# Patient Record
Sex: MALE | Race: BLACK OR AFRICAN AMERICAN | NOT HISPANIC OR LATINO | URBAN - METROPOLITAN AREA
[De-identification: names, ages, dates, MRNs, and addresses within clinical notes are randomized per-mention and may not be internally consistent; named-entity substitution may affect disease eponyms.]

---

## 2017-05-26 ENCOUNTER — EMERGENCY (EMERGENCY)
Facility: HOSPITAL | Age: 57
LOS: 1 days | Discharge: HOME | End: 2017-05-26
Attending: EMERGENCY MEDICINE | Admitting: EMERGENCY MEDICINE
Payer: SELF-PAY

## 2017-05-26 VITALS
HEART RATE: 55 BPM | HEIGHT: 71 IN | WEIGHT: 175.05 LBS | RESPIRATION RATE: 17 BRPM | TEMPERATURE: 98 F | DIASTOLIC BLOOD PRESSURE: 74 MMHG | OXYGEN SATURATION: 98 % | SYSTOLIC BLOOD PRESSURE: 110 MMHG

## 2017-05-26 DIAGNOSIS — M25.561 PAIN IN RIGHT KNEE: ICD-10-CM

## 2017-05-26 DIAGNOSIS — F14.10 COCAINE ABUSE, UNCOMPLICATED: ICD-10-CM

## 2017-05-26 PROCEDURE — 99283 EMERGENCY DEPT VISIT LOW MDM: CPT | Mod: 25

## 2017-05-26 PROCEDURE — 99053 MED SERV 10PM-8AM 24 HR FAC: CPT

## 2017-05-26 RX ORDER — ACETAMINOPHEN 500 MG
650 TABLET ORAL ONCE
Qty: 0 | Refills: 0 | Status: COMPLETED | OUTPATIENT
Start: 2017-05-26 | End: 2017-05-26

## 2017-05-26 RX ADMIN — Medication 650 MILLIGRAM(S): at 02:20

## 2017-05-26 NOTE — ED PROVIDER NOTE - MEDICAL DECISION MAKING DETAILS
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 will d/c. ambulatory with cane at baseline.

## 2017-05-26 NOTE — ED PROVIDER NOTE - OBJECTIVE STATEMENT
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 to [facility] and marijuana, patient uncooperative with history or physical, refusing to change into gown or to cooperate with xrays. Ambulates with cane at baseline.

## 2017-05-26 NOTE — ED ADULT TRIAGE NOTE - CHIEF COMPLAINT QUOTE
bibems c/o right knee pain x 1 day with swelling, picked up by ems from front of precinct. admits to smoking crack cocaine and marijuana, falling asleep when spoken to.

## 2019-12-21 ENCOUNTER — EMERGENCY (EMERGENCY)
Facility: HOSPITAL | Age: 59
LOS: 1 days | Discharge: HOME | End: 2019-12-21
Attending: EMERGENCY MEDICINE | Admitting: EMERGENCY MEDICINE
Payer: MEDICAID

## 2019-12-21 ENCOUNTER — EMERGENCY (EMERGENCY)
Facility: HOSPITAL | Age: 59
LOS: 1 days | Discharge: HOME | End: 2019-12-21
Admitting: EMERGENCY MEDICINE
Payer: MEDICAID

## 2019-12-21 VITALS
RESPIRATION RATE: 16 BRPM | OXYGEN SATURATION: 96 % | SYSTOLIC BLOOD PRESSURE: 144 MMHG | DIASTOLIC BLOOD PRESSURE: 94 MMHG | HEIGHT: 71 IN | WEIGHT: 160.06 LBS | HEART RATE: 65 BPM | TEMPERATURE: 97 F

## 2019-12-21 VITALS
WEIGHT: 179.9 LBS | RESPIRATION RATE: 16 BRPM | HEART RATE: 58 BPM | HEIGHT: 70 IN | OXYGEN SATURATION: 98 % | DIASTOLIC BLOOD PRESSURE: 77 MMHG | SYSTOLIC BLOOD PRESSURE: 115 MMHG | TEMPERATURE: 98 F

## 2019-12-21 VITALS
RESPIRATION RATE: 16 BRPM | OXYGEN SATURATION: 99 % | TEMPERATURE: 98 F | SYSTOLIC BLOOD PRESSURE: 104 MMHG | DIASTOLIC BLOOD PRESSURE: 71 MMHG | HEART RATE: 69 BPM

## 2019-12-21 PROCEDURE — 99283 EMERGENCY DEPT VISIT LOW MDM: CPT

## 2019-12-21 RX ORDER — IBUPROFEN 200 MG
600 TABLET ORAL ONCE
Refills: 0 | Status: COMPLETED | OUTPATIENT
Start: 2019-12-21 | End: 2019-12-21

## 2019-12-21 RX ORDER — LIDOCAINE 4 G/100G
1 CREAM TOPICAL ONCE
Refills: 0 | Status: COMPLETED | OUTPATIENT
Start: 2019-12-21 | End: 2019-12-21

## 2019-12-21 RX ORDER — METHOCARBAMOL 500 MG/1
1000 TABLET, FILM COATED ORAL ONCE
Refills: 0 | Status: COMPLETED | OUTPATIENT
Start: 2019-12-21 | End: 2019-12-21

## 2019-12-21 RX ORDER — OXYCODONE AND ACETAMINOPHEN 5; 325 MG/1; MG/1
2 TABLET ORAL ONCE
Refills: 0 | Status: DISCONTINUED | OUTPATIENT
Start: 2019-12-21 | End: 2019-12-21

## 2019-12-21 RX ADMIN — METHOCARBAMOL 1000 MILLIGRAM(S): 500 TABLET, FILM COATED ORAL at 11:02

## 2019-12-21 RX ADMIN — Medication 600 MILLIGRAM(S): at 11:02

## 2019-12-21 RX ADMIN — LIDOCAINE 1 PATCH: 4 CREAM TOPICAL at 02:20

## 2019-12-21 RX ADMIN — Medication 500 MILLIGRAM(S): at 02:20

## 2019-12-21 RX ADMIN — Medication 600 MILLIGRAM(S): at 16:11

## 2019-12-21 RX ADMIN — OXYCODONE AND ACETAMINOPHEN 2 TABLET(S): 5; 325 TABLET ORAL at 16:13

## 2019-12-21 NOTE — ED ADULT TRIAGE NOTE - CHIEF COMPLAINT QUOTE
BIBA after [place] 3 steps [place] train station. was seen at [facility] earlier today with same complaint of left knee pain radiating to lower back. pt admits to ETOH prior to first ED visit.

## 2019-12-21 NOTE — ED PROVIDER NOTE - NSFOLLOWUPINSTRUCTIONS_ED_ALL_ED_FT
1)  PLEASE FOLLOW-UP WITH YOUR PRIMARY CARE DOCTOR OR REFERRED SPECIALIST IN 1-2 DAYS.     2)  BRING ALL PAPERWORK FROM TODAY'S EMERGENCY ROOM  VISIT TO YOUR FOLLOW-UP VISIT.  IF YOU DO NOT HAVE A PRIMARY CARE DOCTOR PLEASE REFER TO THE OFFICE/CLINIC INFORMATION GIVEN ABOVE.  YOU MAY ALSO CALL [phone] AND ASK FOR MS. MAGALY BRANDT.  SHE CAN HELP YOU MAKE A FOLLOW-UP APPOINTMENT.  HER HOURS ARE 11AM-7PM MONDAY - FRIDAY.    3) PLEASE RETURN TO THE ER IMMEDIATELY OR CALL 911 FOR ANY HIGH FEVER, TROUBLE BREATHING, CHEST PAIN, WEAKNESS, VOMITING, SEVERE PAIN, OR ANY OTHER CONCERNS.

## 2019-12-21 NOTE — ED PROVIDER NOTE - CLINICAL SUMMARY MEDICAL DECISION MAKING FREE TEXT BOX
Pt presents to ED with c/o back pain and sustained head injury. States he fell down some steps 2 days ago. Pt requesting pain medication. Give Motrin and Robaxin. No imaging at this time as there is no acute trauma. No neuro/focal deficits. Will have pt consult with SBIRT given frequent alcohol use.

## 2019-12-21 NOTE — ED PROVIDER NOTE - PROGRESS NOTE DETAILS
Patient resting comfortably, awoken to voice, awake, alert, oriented x 3, has eaten and states he feels better after the medication.  Plan to discharge patient with resources.

## 2019-12-21 NOTE — ED PROVIDER NOTE - OBJECTIVE STATEMENT
59 y o male with PMHx of arthritis, homeless, was BIBA with c/o back pain and head injury. Pt was seen at [facility] several hours ago with c/o left knee pain. States that he fell down 3 steps 2 days ago and hit his head. Pt complaining of lower back pain at this time. States that he came from [employer] and that when he stood up from sitting, he developed back spasms. No numbness, tingling, weakness, or other sx. No LOC. 59 y o male with PMHx of arthritis, homeless, was BIBA with c/o back pain and head injury. Pt was seen at [facility] several hours ago with c/o left knee pain. States that he fell down 3 steps x2 days ago and hit his head. Pt complaining of lower back pain at this time. States that he came from [employer] and that when he stood up from sitting, he developed back spasms. No numbness, tingling, weakness, or other sx. No LOC. Pt admits to drinking regularly 59 y o male with PMHx of arthritis, homeless, was BIBA with c/o back pain and head injury. Pt was seen at [facility] several hours ago with c/o left knee pain. States that he fell down 3 steps x2 days ago and hit his head. Pt complaining of lower back pain at this time. States that he came from [employer] and that when he stood up from sitting, he developed back spasms. No numbness, tingling, weakness, or other sx. No LOC. Pt admits to drinking regularly.  No numbness, tingling, or loss of function.  Able to walk with cane.

## 2019-12-21 NOTE — ED PROVIDER NOTE - CLINICAL SUMMARY MEDICAL DECISION MAKING FREE TEXT BOX
left knee pain from arthritis. no new injury or trauma. no indication for xray. able to ambulate with cane.

## 2019-12-21 NOTE — ED ADULT NURSE NOTE - CHIEF COMPLAINT QUOTE
BIBA after [place] 3 [place] train station. was seen at [facility] earlier today with same complaint of left knee pain radiating to lower back. pt admits to ETOH prior to first ED visit.

## 2019-12-21 NOTE — ED PROVIDER NOTE - PATIENT PORTAL LINK FT
You can access the FollowMyHealth Patient Portal offered by [facility] by registering at the following website: [email or web address]. By joining [employer]’s FollowMyHealth portal, you will also be able to view your health information using other applications (apps) compatible with our system.

## 2019-12-21 NOTE — ED PROVIDER NOTE - OBJECTIVE STATEMENT
59 year old male with h/o arthritis, homeless presents with left knee pain x months. reports ongoing pain, some days better an dother days worse.   Denies head trauma, LOC, break in the skin, paresthesia, numbness, tingling, redness, bleeding, d/c, HA, dizziness, SOB, CP, palpitations, N/V, focal weakness, and malaise.

## 2019-12-21 NOTE — ED PROVIDER NOTE - NSFOLLOWUPINSTRUCTIONS_ED_ALL_ED_FT
Please see your primary care doctor in the next 2-3 days for a repeat evaluation.  Please take all of today's paperwork with you.  If you don't have a doctor or would like help finding a new one, please contact our call center at [phone].    Please return immediately to the Emergency Department if you have pain, fever, trouble breathing, a rash, or if you have any other problems or concerns at all.   You can reach us at [phone], 24 hours a day, 7 days a week.

## 2019-12-21 NOTE — ED PROVIDER NOTE - PATIENT PORTAL LINK FT
You can access the FollowMyHealth Patient Portal offered by [facility] by registering at the following website: [email or web address]. By joining [employer]’s FollowMyHealth portal, you will also be able to view your health information using other applications (apps) compatible with our system.

## 2019-12-21 NOTE — ED PROVIDER NOTE - NS_EDPROVIDERDISPOUSERTYPE_ED_A_ED
[address] [facility]   Patient Discharge Instructions    Bill Borrero / [id number] : 1947    Admitted 2018 Discharged: 2018     IF YOU HAVE ANY PROBLEMS ONCE YOU ARE AT HOME CALL THE FOLLOWING NUMBERS:   Main office number: [phone]    Take Home Medications     · It is important that you take the medication exactly as they are prescribed. · Keep your medication in the bottles provided by the pharmacist and keep a list of the medication names, dosages, and times to be taken in your wallet. · Do not take other medications without consulting your doctor. What to do at [address] your prehospital diet. If you have excessive nausea or vomitting call your doctor's office     Home Physical Therapy is arranged. Use rolling walker when walking. Patients who have had a joint replacement should not drive until you are seen for your follow up appointment by Dr. Narda Menjivar. When to Call    - Call if you have a temperature greater then 101  - Unable to keep food down  - Loose control of your bladder or bowel function  - Are unable to bear any weight   - Need a pain medication refill       DISCHARGE SUMMARY from Nurse    The following personal items collected during your admission are returned to you:   Dental Appliance: Dental Appliances: None  Vision: Visual Aid: Glasses  Hearing Aid:   na  Jewelry: Jewelry: None  Clothing: Clothing: At bedside  Other Valuables: Other Valuables: Cell Phone  Valuables sent to safe: Personal Items Sent to Safe: n/a    PATIENT INSTRUCTIONS:    After general anesthesia or intravenous sedation, for 24 hours or while taking prescription Narcotics:  · Limit your activities  · Do not drive and operate hazardous machinery  · Do not make important personal or business decisions  · Do  not drink alcoholic beverages  · If you have not urinated within 8 hours after discharge, please contact your surgeon on call.     Report the following to your surgeon:  · Excessive pain, swelling, redness or odor of or around the surgical area  · Temperature over 101  · Nausea and vomiting lasting longer than 4 hours or if unable to take medications  · Any signs of decreased circulation or nerve impairment to extremity: change in color, persistent  numbness, tingling, coldness or increase pain  · Follow hip precautions @ all times! · Any questions, call office @ [phone]      Keep scheduled follow up appointment. If need to change, call office @ [phone]. *  Please give a list of your current medications to your Primary Care Provider. *  Please update this list whenever your medications are discontinued, doses are      changed, or new medications (including over-the-counter products) are added. *  Please carry medication information at all times in case of emergency situations. Hip Replacement Surgery (Posterior): What to Expect at Home  Your Recovery  Hip replacement surgery replaces the worn parts of your hip joint. When you leave the hospital, you will probably be walking with crutches or a walker. You may be able to climb a few stairs and get in and out of bed and chairs. But you will need someone to help you at home for the next few weeks or until you have more energy and can move around better. If there is no one to help you at home, you may go to a rehabilitation center or long-term care center. You will go home with a bandage and stitches or staples. You can remove the bandage when your doctor tells you to. Your doctor will remove your stitches or staples 10 days to 3 weeks after your surgery. You may still have some mild pain, and the area may be swollen for 3 to 4 months after surgery. Your doctor will give you medicine for the pain. You will continue the rehabilitation program (rehab) you started in the hospital. The better you do with your rehab exercises, the sooner you will get your strength and movement back.  Most people are able to return to work 4 weeks to 4 months after surgery. This care sheet gives you a general idea about how long it will take for you to recover. But each person recovers at a different pace. Follow the steps below to get better as quickly as possible. How can you care for yourself at home? Activity  ? · Your doctor may not want your affected leg to cross the center of your body toward the other leg. If so, your therapist may suggest these ideas:  ¨ Do not cross your legs. ¨ Be very careful as you get in or out of bed or a car, so your leg does not cross that imaginary line in the middle of your body. ? · Rest when you feel tired. You may take a nap, but do not stay in bed all day. ? · Work with your physical therapist to learn the best way to exercise. You may be able to take frequent, short walks using crutches or a walker. You will probably have to use crutches or a walker for at least 4 to 6 weeks. ? · Your doctor may advise you to stay away from activities that put stress on the joint. This includes sports such as tennis, football, and jogging. ? · Try not to sit for too long at one time. You will feel less stiff if you take a short walk about every hour. When you sit, use chairs with arms, and do not sit in low chairs. ? · Do not bend over more than 90 degrees (like the angle in a letter \"L\"). ? · Sleep on your back with your legs slightly apart or on your side with a pillow between your knees for about 6 weeks or as your doctor tells you. Do not sleep on your stomach or affected leg. ? · You may need to take sponge baths until your stitches or staples have been removed. You will probably be able to shower 24 hours after they are removed. ? · Ask your doctor when you can drive again. ? · Most people are able to return to work 4 weeks to 4 months after surgery. ? · Ask your doctor when it is okay for you to have sex. Diet  ?  · By the time you leave the hospital, you will probably be eating your normal diet. If your stomach is upset, try bland, low-fat foods like plain rice, broiled chicken, toast, and yogurt. Your doctor may recommend that you take iron and vitamin supplements. ? · Drink plenty of fluids (unless your doctor tells you not to). ? · Eat healthy foods, and watch your portion sizes. Try to stay at your ideal weight. Too much weight puts more stress on your new hip joint. ? · You may notice that your bowel movements are not regular right after your surgery. This is common. Try to avoid constipation and straining with bowel movements. You may want to take a fiber supplement every day. If you have not had a bowel movement after a couple of days, ask your doctor about taking a mild laxative. Medicines  ? · Your doctor will tell you if and when you can restart your medicines. He or she will also give you instructions about taking any new medicines. ? · If you take blood thinners, such as warfarin (Coumadin), clopidogrel (Plavix), or aspirin, be sure to talk to your doctor. He or she will tell you if and when to start taking those medicines again. Make sure that you understand exactly what your doctor wants you to do.   ? · Your doctor may give you a blood-thinning medicine to prevent blood clots. If you take a blood thinner, be sure you get instructions about how to take your medicine safely. Blood thinners can cause serious bleeding problems. This medicine could be in pill form or as a shot (injection). If a shot is necessary, your doctor will tell you how to do this. ? · Be safe with medicines. Take pain medicines exactly as directed. ¨ If the doctor gave you a prescription medicine for pain, take it as prescribed. ¨ If you are not taking a prescription pain medicine, ask your doctor if you can take an over-the-counter medicine.    ? · If you think your pain medicine is making you sick to your stomach:  ¨ Take your medicine after meals (unless your doctor has told you not to).  ¨ Ask your doctor for a different pain medicine. ? · If your doctor prescribed antibiotics, take them as directed. Do not stop taking them just because you feel better. You need to take the full course of antibiotics. Incision care  ? · You will have a bandage over the cut (incision) and staples or stitches. Follow your doctor's instructions on when to take the bandage off. Giving the incision air will help it heal.   ? · Your doctor will remove the staples or stitches 10 days to 3 weeks after the surgery and replace them with strips of tape. Leave the strips on for a week or until they fall off. Exercise  ? · Your rehab program will include a number of exercises to do. Always do them as your therapist tells you. Ice and elevation  ? · For pain, put ice or a cold pack on the area for 10 to 20 minutes at a time. Put a thin cloth between the ice and your skin. ? · Your ankle may swell for about 3 months. Prop up your ankle when you ice it or anytime you sit or lie down. Try to keep it above the level of your heart. This will help reduce swelling. Other instructions  ? Continue to wear your support stockings as your doctor says. These help to prevent blood clots. The length of time that you will have to wear them depends on your activity level and the amount of swelling you have. Most people wear these stockings for 4 to 6 weeks after surgery. ?Preventing falls is also very important. To prevent falls:  ? · Arrange furniture so that you will not trip on it. ? · Get rid of throw rugs, and move electrical cords out of the way. ? · Walk only in areas with plenty of light. ? · Put grab bars in showers and bathtubs. ? · Avoid icy or snowy sidewalks. ? · Wear shoes with sturdy, flat soles. Follow-up care is a key part of your treatment and safety. Be sure to make and go to all appointments, and call your doctor if you are having problems.  It's also a good idea to know your test results and keep a list of the medicines you take. When should you call for help? Call 911 anytime you think you may need emergency care. For example, call if:  ? · You passed out (lost consciousness). ? · You have severe trouble breathing. ? · You have sudden chest pain and shortness of breath, or you cough up blood. ?Call your doctor now or seek immediate medical care if:  ? · You have signs that your hip may be dislocated, including:  ¨ Severe pain and not being able to stand. ¨ A crooked leg that looks like your hip is out of position. ¨ Not being able to bend or straighten your leg. ? · Your leg or foot is cool or pale or changes color. ? · You cannot feel or move your leg. ? · You have signs of a blood clot, such as:  ¨ Pain in your calf, back of the knee, thigh, or groin. ¨ Redness and swelling in your leg or groin. ? · Your incision comes open and begins to bleed, or the bleeding increases. ? · You feel like your heart is racing or beating irregularly. ? · You have signs of infection, such as:  ¨ Increased pain, swelling, warmth, or redness. ¨ Red streaks leading from the incision. ¨ Pus draining from the incision. ¨ A fever. ? Watch closely for changes in your health, and be sure to contact your doctor if:  ? · You do not have a bowel movement after taking a laxative. ? · You do not get better as expected. Where can you learn more? Go to http://bryan-wilda.info/. Enter C294 in the search box to learn more about \"Hip Replacement Surgery (Posterior): What to Expect at Home. \"  Current as of: March 21, 2017  Content Version: 11.4  © 6078-0220 [employer]. Care instructions adapted under license by [employer] (which disclaims liability or warranty for this information).  If you have questions about a medical condition or this instruction, always ask your healthcare professional. [facility] [address] any warranty or liability for your use of this information. These are general instructions for a healthy lifestyle:    No smoking/ No tobacco products/ Avoid exposure to second hand smoke    Surgeon General's Warning:  Quitting smoking now greatly reduces serious risk to your health. Obesity, smoking, and sedentary lifestyle greatly increases your risk for illness    A healthy diet, regular physical exercise & weight monitoring are important for maintaining a healthy lifestyle    You may be retaining fluid if you have a history of heart failure or if you experience any of the following symptoms:  Weight gain of 3 pounds or more overnight or 5 pounds in a week, increased swelling in our hands or feet or shortness of breath while lying flat in bed. Please call your doctor as soon as you notice any of these symptoms; do not wait until your next office visit. Recognize signs and symptoms of STROKE:    F-face looks uneven    A-arms unable to move or move even    S-speech slurred or non-existent    T-time-call 911 as soon as signs and symptoms begin-DO NOT go       Back to bed or wait to see if you get better-TIME IS BRAIN. The discharge information has been reviewed with the patient. The patient verbalized understanding. Information obtained by :  I understand that if any problems occur once I am at home I am to contact my physician. I understand and acknowledge receipt of the instructions indicated above.                                                                                                                                            Physician's or R.N.'s Signature                                                                  Date/Time                                                                                                                                              Patient or Representative Signature                                                          Date/Time Scribe Attestation (For Scribes USE Only)... Attending Attestation (For Attendings USE Only).../Scribe Attestation (For [employer] USE Only)...

## 2019-12-21 NOTE — ED PROVIDER NOTE - PHYSICAL EXAMINATION
VITAL SIGNS: I have reviewed nursing notes and confirm.  CONSTITUTIONAL: Well-developed; well-nourished; in no acute distress.  SKIN: Skin is warm and dry, no acute rash.  HEAD: Normocephalic; atraumatic.  EYES: PERRL, EOM intact; conjunctiva and sclera clear.  ENT: No nasal discharge; airway clear.  NECK: Supple; non tender.  CARD: S1, S2 normal; no murmurs, gallops, or rubs. Regular rate and rhythm.  RESP: No wheezes, rales or rhonchi.  ABD: Normal bowel sounds; soft; non-distended; non-tender;  no palpable or pulsating mass; no hepatosplenomegaly.  EXT: left knee: no swelling, ROM intact, no bony tenderness active ROM normal, Anterior Drawer NEG, Lachman NEG, Valgus stress NEG, Varus stress NEG, Checo test NEG, Apley Grind test NEG  OTHER: Normal ROM. No clubbing, cyanosis or edema.  NEURO: Alert, oriented. Grossly unremarkable.  PSYCH: Cooperative, appropriate.

## 2019-12-21 NOTE — ED ADULT NURSE NOTE - NSIMPLEMENTINTERV_GEN_ALL_ED
Implemented All Universal Safety Interventions:  [place] to call system. Call bell, personal items and telephone within reach. Instruct patient to call for assistance. Room bathroom lighting operational. Non-slip footwear when patient is off stretcher. Physically safe environment: no spills, clutter or unnecessary equipment. Stretcher in lowest position, wheels locked, appropriate side rails in place.

## 2019-12-27 DIAGNOSIS — M54.9 DORSALGIA, UNSPECIFIED: ICD-10-CM

## 2019-12-27 DIAGNOSIS — M46.96 UNSPECIFIED INFLAMMATORY SPONDYLOPATHY, LUMBAR REGION: ICD-10-CM

## 2019-12-28 DIAGNOSIS — M25.562 PAIN IN LEFT KNEE: ICD-10-CM

## 2020-06-09 PROBLEM — M19.90 UNSPECIFIED OSTEOARTHRITIS, UNSPECIFIED SITE: Chronic | Status: ACTIVE | Noted: 2019-12-21

## 2020-06-09 PROBLEM — E07.9 DISORDER OF THYROID, UNSPECIFIED: Chronic | Status: ACTIVE | Noted: 2019-12-21

## 2020-06-10 ENCOUNTER — APPOINTMENT (OUTPATIENT)
Dept: OTOLARYNGOLOGY | Facility: CLINIC | Age: 60
End: 2020-06-10

## 2020-06-10 PROBLEM — Z00.00 ENCOUNTER FOR PREVENTIVE HEALTH EXAMINATION: Status: ACTIVE | Noted: 2020-06-10

## 2021-01-22 NOTE — ED ADULT TRIAGE NOTE - CADM TRG TX PRIOR TO ARRIVAL
----- Message from Don Blackmon MD sent at 1/22/2021  2:29 PM EST -----  Please call the patient about their results with the following discussion points:    The good news is that your thyroid panel, liver, kidneys, and electrolytes look normal    Unfortunately your cholesterol is moderately elevated - LDL is 122. This can be associated with a long-term risk of heart disease. I do not recommend a medication at this time but I do recommend regular exercise and reducing red meat and fatty food consumption.  We will recheck this annually.     none

## 2021-06-02 NOTE — ED ADULT TRIAGE NOTE - HEIGHT IN INCHES
10

## 2021-12-06 ENCOUNTER — EMERGENCY (EMERGENCY)
Facility: HOSPITAL | Age: 61
LOS: 1 days | Discharge: HOME | End: 2021-12-06
Attending: EMERGENCY MEDICINE
Payer: MEDICARE

## 2021-12-06 VITALS
HEART RATE: 98 BPM | HEIGHT: 71 IN | DIASTOLIC BLOOD PRESSURE: 82 MMHG | OXYGEN SATURATION: 96 % | SYSTOLIC BLOOD PRESSURE: 131 MMHG | WEIGHT: 160.06 LBS | RESPIRATION RATE: 17 BRPM | TEMPERATURE: 98 F

## 2021-12-06 DIAGNOSIS — Z78.9 OTHER SPECIFIED HEALTH STATUS: Chronic | ICD-10-CM

## 2021-12-06 PROCEDURE — 72125 CT NECK SPINE W/O DYE: CPT | Mod: MA

## 2021-12-06 PROCEDURE — 99284 EMERGENCY DEPT VISIT MOD MDM: CPT | Mod: 25

## 2021-12-06 PROCEDURE — 99284 EMERGENCY DEPT VISIT MOD MDM: CPT

## 2021-12-06 PROCEDURE — 70450 CT HEAD/BRAIN W/O DYE: CPT | Mod: MA

## 2021-12-06 PROCEDURE — 72125 CT NECK SPINE W/O DYE: CPT | Mod: 26,MA

## 2021-12-06 PROCEDURE — 70450 CT HEAD/BRAIN W/O DYE: CPT | Mod: 26,MA

## 2021-12-06 RX ORDER — ACETAMINOPHEN WITH CODEINE 300MG-30MG
1 TABLET ORAL ONCE
Refills: 0 | Status: DISCONTINUED | OUTPATIENT
Start: 2021-12-06 | End: 2021-12-06

## 2021-12-06 RX ORDER — DIAZEPAM 5 MG
5 TABLET ORAL ONCE
Refills: 0 | Status: DISCONTINUED | OUTPATIENT
Start: 2021-12-06 | End: 2021-12-06

## 2021-12-06 RX ADMIN — Medication 1 TABLET(S): at 10:10

## 2021-12-06 RX ADMIN — Medication 5 MILLIGRAM(S): at 10:11

## 2021-12-06 NOTE — ED PROVIDER NOTE - NSFOLLOWUPINSTRUCTIONS_ED_ALL_ED_FT
Stable Cervical Spine Fracture       A cervical spine fracture is a break or crack in one of the seven bones (vertebrae) that make up the spine of the neck. These bones hold up the head and protect the spinal cord. The spinal cord runs through the center of the vertebrae. The fracture is considered stable if there is a very low risk of problems happening during healing, and if the bones have not moved out of position.      What are the causes?  This condition may be caused by:  •Motor vehicle accidents.      •Injuries from sports such as diving, football, biking, wrestling, or skiing.    •Severe osteoporosis or other bone diseases. These may include:  •Cancers that spread to the bone.      •Metabolic abnormalities that cause bone weakness.          What are the signs or symptoms?  Symptoms of this condition include:  •Severe neck pain after an accident or fall. Pain may spread (radiate) down the shoulders or arms.      •Bruising or swelling on the back of the neck.      •Numbness, tingling, sudden muscle tightening (spasms), or weakness in the arms or legs or both.        How is this diagnosed?  This condition may be diagnosed based on:  •Your medical history.      •A physical exam of your neck, arms, and legs.      •Imaging studies of your neck, such as X-ray, CT scan, or MRI.        How is this treated?  This condition is treated with:  •A device that supports your chin and the back of your head (neck brace or cervical collar). This will keep your neck from moving during the healing process.       •Medicine to help relieve pain, if needed.        Follow these instructions at home:    If you have a neck brace or cervical collar:     •Wear the brace or collar as told by your health care provider. Do not remove it unless told by your health care provider.    •If you are allowed to remove your collar or brace for cleaning and bathing:  •Follow your health care provider's instructions about how to safely take off the collar.      •Wash and thoroughly dry the skin on your neck. Check your skin for irritation or sores. If you see any, tell your health care provider.      •Keep your collar or brace clean by wiping it with mild soap and water and letting it air-dry completely. The pads can be hand-washed with soap and water and air-dried completely.        •Ask your health care provider before making any adjustments to your collar or brace. Small adjustments may be needed over time to improve comfort and reduce pressure on your chin or on the back of your head.    •If your brace or collar is not waterproof:  •Do not let it get wet.      •Cover it with a watertight covering when you take a bath or a shower.          Managing pain, stiffness, and swelling   If directed, put ice on the injured area. To do this:  •If you have a removable brace or cervical collar, remove it as told by your health care provider.      •Put ice in a plastic bag.      •Place a towel between your skin and the bag.      •Leave the ice on for 20 minutes, 2–3 times a day.      Activity     •Ask your health care provider when it is safe to drive if you have a brace or collar on your neck.      •Ask your health care provider if the medicine prescribed to you requires you to avoid driving or using machinery.      • Do not lift anything that is heavier than 10 lb (4.5 kg), or the limit that you are told, until your health care provider says that it is safe.      •Return to your normal activities as told by your health care provider. Ask your health care provider what activities are safe for you.      General instructions     •Take over-the-counter and prescription medicines only as told by your health care provider.      • Do not take baths, swim, or use a hot tub until your health care provider approves. Ask your health care provider if you can take showers. You may only be allowed to take sponge baths.      • Do not use any products that contain nicotine or tobacco, such as cigarettes, e-cigarettes, and chewing tobacco. These can delay bone healing. If you need help quitting, ask your health care provider.    •Your medicines may cause constipation. To prevent or treat constipation, you may need to:  •Drink enough fluid to keep your urine pale yellow.      •Take over-the-counter or prescription medicines.      •Eat foods that are high in fiber, such as beans, whole grains, and fresh fruits and vegetables.      •Limit foods that are high in fat and processed sugars, such as fried or sweet foods.        •Keep all follow-up visits as told by your health care provider. This is important. Follow-up visits will help prevent long-term (chronic) or permanent injury, pain, and disability. You may need follow-up X-rays or MRI 1–3 weeks after your injury.        Contact a health care provider if:    •You have irritation or sores on your skin from your brace or cervical collar.        Get help right away if:    •You have neck pain that gets worse.      •You develop difficulties swallowing or breathing.      •You develop swelling in your neck.    •You have any of the following problems in your arms or legs or both:  •Numbness.      •Weakness.      •Burning pain.      •Movement problems.        •You are unable to control when you urinate or have a bowel movement (incontinence).      •You have problems with coordination or difficulty walking.      These symptoms may represent a serious problem that is an emergency. Do not wait to see if the symptoms will go away. Get medical help right away. Call your local emergency services (911 in the [place] Do not drive yourself to the hospital.       Summary    •A cervical spine fracture is a break or crack in one of the seven bones (vertebrae) that make up the spine of the neck.      •Your fracture is considered stable if there is a very low risk of problems happening during healing.      •A fracture is treated with a device to support your neck and with pain medicine, if needed.      This information is not intended to replace advice given to you by your health care provider. Make sure you discuss any questions you have with your health care provider. Orthopedics Recommendation: Conservative treatment  - Pain management-   PT- WBAT of the upper extremities. Proper body mechanics.  - Pt is orthopedically stable for discharge  - Case d/w Dr. Mae  - Follow up with Follow up with Dr Mae after discharge at: [phone]   also consider Follow up with Dr Quirino Chen in one week as oupt at: [phone]  if Dr. Mae unavailable.         Stable Cervical Spine Fracture       A cervical spine fracture is a break or crack in one of the seven bones (vertebrae) that make up the spine of the neck. These bones hold up the head and protect the spinal cord. The spinal cord runs through the center of the vertebrae. The fracture is considered stable if there is a very low risk of problems happening during healing, and if the bones have not moved out of position.      What are the causes?  This condition may be caused by:  •Motor vehicle accidents.      •Injuries from sports such as diving, football, biking, wrestling, or skiing.    •Severe osteoporosis or other bone diseases. These may include:  •Cancers that spread to the bone.      •Metabolic abnormalities that cause bone weakness.          What are the signs or symptoms?  Symptoms of this condition include:  •Severe neck pain after an accident or fall. Pain may spread (radiate) down the shoulders or arms.      •Bruising or swelling on the back of the neck.      •Numbness, tingling, sudden muscle tightening (spasms), or weakness in the arms or legs or both.        How is this diagnosed?  This condition may be diagnosed based on:  •Your medical history.      •A physical exam of your neck, arms, and legs.      •Imaging studies of your neck, such as X-ray, CT scan, or MRI.        How is this treated?  This condition is treated with:  •A device that supports your chin and the back of your head (neck brace or cervical collar). This will keep your neck from moving during the healing process.       •Medicine to help relieve pain, if needed.        Follow these instructions at home:    If you have a neck brace or cervical collar:     •Wear the brace or collar as told by your health care provider. Do not remove it unless told by your health care provider.    •If you are allowed to remove your collar or brace for cleaning and bathing:  •Follow your health care provider's instructions about how to safely take off the collar.      •Wash and thoroughly dry the skin on your neck. Check your skin for irritation or sores. If you see any, tell your health care provider.      •Keep your collar or brace clean by wiping it with mild soap and water and letting it air-dry completely. The pads can be hand-washed with soap and water and air-dried completely.        •Ask your health care provider before making any adjustments to your collar or brace. Small adjustments may be needed over time to improve comfort and reduce pressure on your chin or on the back of your head.    •If your brace or collar is not waterproof:  •Do not let it get wet.      •Cover it with a watertight covering when you take a bath or a shower.          Managing pain, stiffness, and swelling   If directed, put ice on the injured area. To do this:  •If you have a removable brace or cervical collar, remove it as told by your health care provider.      •Put ice in a plastic bag.      •Place a towel between your skin and the bag.      •Leave the ice on for 20 minutes, 2–3 times a day.      Activity     •Ask your health care provider when it is safe to drive if you have a brace or collar on your neck.      •Ask your health care provider if the medicine prescribed to you requires you to avoid driving or using machinery.      • Do not lift anything that is heavier than 10 lb (4.5 kg), or the limit that you are told, until your health care provider says that it is safe.      •Return to your normal activities as told by your health care provider. Ask your health care provider what activities are safe for you.      General instructions     •Take over-the-counter and prescription medicines only as told by your health care provider.      • Do not take baths, swim, or use a hot tub until your health care provider approves. Ask your health care provider if you can take showers. You may only be allowed to take sponge baths.      • Do not use any products that contain nicotine or tobacco, such as cigarettes, e-cigarettes, and chewing tobacco. These can delay bone healing. If you need help quitting, ask your health care provider.    •Your medicines may cause constipation. To prevent or treat constipation, you may need to:  •Drink enough fluid to keep your urine pale yellow.      •Take over-the-counter or prescription medicines.      •Eat foods that are high in fiber, such as beans, whole grains, and fresh fruits and vegetables.      •Limit foods that are high in fat and processed sugars, such as fried or sweet foods.        •Keep all follow-up visits as told by your health care provider. This is important. Follow-up visits will help prevent long-term (chronic) or permanent injury, pain, and disability. You may need follow-up X-rays or MRI 1–3 weeks after your injury.        Contact a health care provider if:    •You have irritation or sores on your skin from your brace or cervical collar.        Get help right away if:    •You have neck pain that gets worse.      •You develop difficulties swallowing or breathing.      •You develop swelling in your neck.    •You have any of the following problems in your arms or legs or both:  •Numbness.      •Weakness.      •Burning pain.      •Movement problems.        •You are unable to control when you urinate or have a bowel movement (incontinence).      •You have problems with coordination or difficulty walking.      These symptoms may represent a serious problem that is an emergency. Do not wait to see if the symptoms will go away. Get medical help right away. Call your local emergency services (911 in the [place] Do not drive yourself to the hospital.       Summary    •A cervical spine fracture is a break or crack in one of the seven bones (vertebrae) that make up the spine of the neck.      •Your fracture is considered stable if there is a very low risk of problems happening during healing.      •A fracture is treated with a device to support your neck and with pain medicine, if needed.      This information is not intended to replace advice given to you by your health care provider. Make sure you discuss any questions you have with your health care provider.

## 2021-12-06 NOTE — ED PROVIDER NOTE - CLINICAL SUMMARY MEDICAL DECISION MAKING FREE TEXT BOX
60 y/o M w/ left-sided neck pain. Symptoms suggest radiculopathy. Will give trial of muscle relaxants and perform imaging of cervical spine and reassess.

## 2021-12-06 NOTE — ED PROVIDER NOTE - OBJECTIVE STATEMENT
60 y/o M, w/ history of DJD, here from assisted living for left-sided neck pain that radiates down arm and left head, worse w/ movement. Patient went to [facility] 2 weeks ago and was told it would get better on its own. No numbness, tingling, or weakness. Patient is on Gabapentin for chronic back pain. No bowel or bladder symptoms. No history of trauma. Patient denies any other complaints. NKDA.

## 2021-12-06 NOTE — ED PROVIDER NOTE - PHYSICAL EXAMINATION
Musculoskeletal: tenderness to left-sided paraspinal cervical region  Neuro: comprehensive neuro normal

## 2021-12-06 NOTE — ED PROVIDER NOTE - PROGRESS NOTE DETAILS
Pt with odontoid fx, chronic appearing. Seen by orthopedics. Pt to f/u as outpatient. Called Dr. Parr office [phone] no answer mailbox full. Will d/c back to assisted living. Pt feels comfortable.

## 2021-12-06 NOTE — CONSULT NOTE ADULT - SUBJECTIVE AND OBJECTIVE BOX
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 denies traumas or falls or recent MVA. Patient denies weakness in the arms, shoulders, or decreased sensation. Patient denies issues with breathing. Pt denies Chest pain, SOB, dyspnea, paresthesias, N/V/D, abdominal pain, syncope, or pain anywhere else.   Patient says he had a stent removed recently and also has a history of irregular heartbeat that takes eliquis for this.     HEALTH ISSUES - PROBLEM Dx:      .    Ambulation: Walking independently [ xxx] With Cane [ ] With Walker [ ]  Bed / wheelchairbound [ ]     PAST MEDICAL & SURGICAL HISTORY:      Allergies: No Known Allergies      Vital Signs Last 24 Hrs  T(C): 36.8 (06 Dec 2021 07:31), Max: 36.8 (06 Dec 2021 07:31)  T(F): 98.3 (06 Dec 2021 07:31), Max: 98.3 (06 Dec 2021 07:31)  HR: 98 (06 Dec 2021 07:31) (98 - 98)  BP: 131/82 (06 Dec 2021 07:31) (131/82 - 131/82)  BP(mean): --  RR: 17 (06 Dec 2021 07:31) (17 - 17)  SpO2: 96% (06 Dec 2021 07:31) (96% - 96%)    Physical Exam:  Appearance: Alert, responsive, in no acute distress.  Musculoskeletal:         Left Upper Extremity: Atraumatic with normal alignment NROM. No crepitus. No bony tenderness.        Right Upper Extremity: Atraumatic with normal alignment NROM. No crepitus. No bony tenderness.        Left Lower Extremity: Atraumatic with normal alignment NROM. No crepitus. No bony tenderness. No calf tenderness, Calf soft.       Right Lower Extremity: Atraumatic with normal alignment NROM. No crepitus. No bony tenderness.  No calf tenderness, Calf soft.    Neurological: Sensation is grossly intact to light touch. No focal deficits or weaknesses found.    Motor exam:          Spine: Skin is pink warm, Tender of the C2 area on the left side. Skin intact. grossly nvi. distal pulses 2+ bilaterally.            Upper extremities: 5/5 strength of the upper extremities  strength 5/5.  can shrug shoulders equally. some pain in the left paraspinals with this motion.           Lower extremities: 5/5 strength. normal strength. some crepitations noted in the knees- likely OA knees.     Labs:            Radiology:     < from: CT Cervical Spine No Cont (12.06.21 @ 09:59) >    EXAM:  CT CERVICAL SPINE                          EXAM:  CT BRAIN                            PROCEDURE DATE:  12/06/2021          INTERPRETATION:  CT head without IV contrast    CLINICAL INFORMATION: LEFT-sided headache    TECHNIQUE: Contiguous axial 5 mm sections were obtained through the head. Sagittal and coronal 2-D reformatted images were also obtained.   This scan was performed using automatic exposure control (radiation dose reduction software) to obtain a diagnostic image quality scan with patient dose as low as reasonably achievable.    FINDINGS:   No previous examinations are available for review.    The brain demonstrates no abnormal attenuation. 1.3 cm LEFT extra-axial dural calcification is noted most likely meningioma. No acute cerebral cortical infarct is seen.  No intracranial hemorrhage is found.  No mass effect is found in the brain.    The ventricles, sulci and basal cisterns appear unremarkable.    The orbits are unremarkable.  The paranasal sinuses are clear.  The nasal cavity appears intact.  The nasopharynx is symmetric.  The central skull base, petrous temporal bones and calvarium remain intact.        CT cervical spine without IV contrast    CLINICAL INFORMATION: Fracture, trauma, neck pain.  Neck pain, spinal stenosis, spondylosis. Underlying arthritis, LEFT sided HA, neck pain, arm pain    TECHNIQUE:  Contiguous axial 2.0 mm sections were obtained through the cervical spine using a single helical acquisition.  Additional 2 mm sagittal and coronal reformatted reconstructions of the spine were obtained.  These additional reformatted images were used to evaluate the spine for alignment, vertebral fractures and the integrity of the the posterior elements.   This scan was performed using automatic exposure control (radiation dose reduction software) to obtain a diagnostic image quality scan with patient dose as low as reasonably achievable.    FINDINGS:   No prior similar studies are available for review    This demonstrated a type I distracted transverse fracture of the odontoid tip with posterior and LEFT lateral displacement of the body of C2. Minimal erosive changes are noted at the fracture site suggesting a chronic process. LEFT subluxation at the C1-2 articulation with marked erosive changes at the C1-2 facet joints and marked widening of the RIGHT lateral atlantodental interval measuring 1 cm.    Cervical intervertebral disc spaces show moderate to severe multilevel degenerative disc disease and spondylosis at C2-3 through C7-T1 withloss of disc height and associated degenerative endplate changes. Mild narrowing of the LEFT C2-3, C4-5 neural foramina due to uncovertebral spurring.    The skull base appears intact.  No neck mass is recognized.  Paraspinal soft tissues appear intact. Visualized lymph nodes appear to be within physiologic size limits.      IMPRESSION:    CT HEAD: No acute abnormality.    1.3 cm LEFT extra-axial dural calcification is noted most likely meningioma.    CT cervical spine:   type I distracted transverse fracture of the odontoid tip with posterior and LEFT lateral displacement of the body of C2. Minimal erosive changes are noted at the fracture site suggesting a chronic process. LEFT subluxation at the C1-2 articulation with marked erosive changes at the C1-2 facet joints and marked widening of the RIGHT lateral atlantodental interval measuring 1 cm.  Moderate to severe multilevel degenerative disc disease and spondylosis at C2-3 through C7-T1 with loss of disc height and associated degenerative endplate changes. Mild narrowing of the LEFT C2-3, C4-5 neural foramina due to uncovertebral spurring.    Critical value:  I discussed the finding of this report with Dr. Valdovinos at 10:30 AM on 12/06/2021.  Critical value policy of the hospital was followed.  Read back and confirmation of receipt of this communication was performed.  This verbal communication supplements the text report of this document.        --- End of Report ---            DELMA SALEEM MD; Attending Radiologist  This document has been electronically signed. Dec  6 2021 11:06AM    < end of copied text >        Impression:  Pt is a  61y Male with  displaced transverse fracture of odontoid tip and DDD of the cervical spine.     Plan:  - Recommendation: Conservative treatment  - Pain management-   PT- WBAT of the upper extremities. Proper body mechanics.  - Pt is orthopedically stable for discharge  - Case d/w Dr. Mae  - Follow up with Follow up with Dr Mae after discharge at: [phone]   also consider Follow up with Dr Quirino Chen in one week as oupt at: [phone]  if Dr. Mae unavailable.    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 denies traumas or falls or recent MVA. Patient denies weakness in the arms, shoulders, or decreased sensation. Patient denies issues with breathing. Pt denies Chest pain, SOB, dyspnea, paresthesias, N/V/D, abdominal pain, syncope, or pain anywhere else.   Patient says he had a stent removed recently and also has a history of irregular heartbeat that takes eliquis for this.     HEALTH ISSUES - PROBLEM Dx:      .    Ambulation: Walking independently [ xxx] With Cane [ ] With Walker [ ]  Bed / wheelchairbound [ ]     PAST MEDICAL & SURGICAL HISTORY:      Allergies: No Known Allergies      Vital Signs Last 24 Hrs  T(C): 36.8 (06 Dec 2021 07:31), Max: 36.8 (06 Dec 2021 07:31)  T(F): 98.3 (06 Dec 2021 07:31), Max: 98.3 (06 Dec 2021 07:31)  HR: 98 (06 Dec 2021 07:31) (98 - 98)  BP: 131/82 (06 Dec 2021 07:31) (131/82 - 131/82)  BP(mean): --  RR: 17 (06 Dec 2021 07:31) (17 - 17)  SpO2: 96% (06 Dec 2021 07:31) (96% - 96%)    Physical Exam:  Appearance: Alert, responsive, in no acute distress.  Musculoskeletal:         Left Upper Extremity: Atraumatic with normal alignment NROM. No crepitus. No bony tenderness.        Right Upper Extremity: Atraumatic with normal alignment NROM. No crepitus. No bony tenderness.        Left Lower Extremity: Atraumatic with normal alignment NROM. No crepitus. No bony tenderness. No calf tenderness, Calf soft.       Right Lower Extremity: Atraumatic with normal alignment NROM. No crepitus. No bony tenderness.  No calf tenderness, Calf soft.    Neurological: Sensation is grossly intact to light touch. No focal deficits or weaknesses found.    Motor exam:          Spine: Skin is pink warm, Tender of the C2 area on the left side. Skin intact. grossly nvi. distal pulses 2+ bilaterally.            Upper extremities: 5/5 strength of the upper extremities  strength 5/5.  can shrug shoulders equally. some pain in the left paraspinals with this motion.           Lower extremities: 5/5 strength. normal strength. some crepitations noted in the knees- likely OA knees.     Labs:            Radiology:     < from: CT Cervical Spine No Cont (12.06.21 @ 09:59) >    EXAM:  CT CERVICAL SPINE                          EXAM:  CT BRAIN                            PROCEDURE DATE:  12/06/2021          INTERPRETATION:  CT head without IV contrast    CLINICAL INFORMATION: LEFT-sided headache    TECHNIQUE: Contiguous axial 5 mm sections were obtained through the head. Sagittal and coronal 2-D reformatted images were also obtained.   This scan was performed using automatic exposure control (radiation dose reduction software) to obtain a diagnostic image quality scan with patient dose as low as reasonably achievable.    FINDINGS:   No previous examinations are available for review.    The brain demonstrates no abnormal attenuation. 1.3 cm LEFT extra-axial dural calcification is noted most likely meningioma. No acute cerebral cortical infarct is seen.  No intracranial hemorrhage is found.  No mass effect is found in the brain.    The ventricles, sulci and basal cisterns appear unremarkable.    The orbits are unremarkable.  The paranasal sinuses are clear.  The nasal cavity appears intact.  The nasopharynx is symmetric.  The central skull base, petrous temporal bones and calvarium remain intact.        CT cervical spine without IV contrast    CLINICAL INFORMATION: Fracture, trauma, neck pain.  Neck pain, spinal stenosis, spondylosis. Underlying arthritis, LEFT sided HA, neck pain, arm pain    TECHNIQUE:  Contiguous axial 2.0 mm sections were obtained through the cervical spine using a single helical acquisition.  Additional 2 mm sagittal and coronal reformatted reconstructions of the spine were obtained.  These additional reformatted images were used to evaluate the spine for alignment, vertebral fractures and the integrity of the the posterior elements.   This scan was performed using automatic exposure control (radiation dose reduction software) to obtain a diagnostic image quality scan with patient dose as low as reasonably achievable.    FINDINGS:   No prior similar studies are available for review    This demonstrated a type I distracted transverse fracture of the odontoid tip with posterior and LEFT lateral displacement of the body of C2. Minimal erosive changes are noted at the fracture site suggesting a chronic process. LEFT subluxation at the C1-2 articulation with marked erosive changes at the C1-2 facet joints and marked widening of the RIGHT lateral atlantodental interval measuring 1 cm.    Cervical intervertebral disc spaces show moderate to severe multilevel degenerative disc disease and spondylosis at C2-3 through C7-T1 withloss of disc height and associated degenerative endplate changes. Mild narrowing of the LEFT C2-3, C4-5 neural foramina due to uncovertebral spurring.    The skull base appears intact.  No neck mass is recognized.  Paraspinal soft tissues appear intact. Visualized lymph nodes appear to be within physiologic size limits.      IMPRESSION:    CT HEAD: No acute abnormality.    1.3 cm LEFT extra-axial dural calcification is noted most likely meningioma.    CT cervical spine:   type I distracted transverse fracture of the odontoid tip with posterior and LEFT lateral displacement of the body of C2. Minimal erosive changes are noted at the fracture site suggesting a chronic process. LEFT subluxation at the C1-2 articulation with marked erosive changes at the C1-2 facet joints and marked widening of the RIGHT lateral atlantodental interval measuring 1 cm.  Moderate to severe multilevel degenerative disc disease and spondylosis at C2-3 through C7-T1 with loss of disc height and associated degenerative endplate changes. Mild narrowing of the LEFT C2-3, C4-5 neural foramina due to uncovertebral spurring.    Critical value:  I discussed the finding of this report with Dr. Valdovinos at 10:30 AM on 12/06/2021.  Critical value policy of the hospital was followed.  Read back and confirmation of receipt of this communication was performed.  This verbal communication supplements the text report of this document.        --- End of Report ---            DELMA SALEEM MD; Attending Radiologist  This document has been electronically signed. Dec  6 2021 11:06AM    < end of copied text >        Impression:  Pt is a  61y Male with  displaced transverse fracture of odontoid tip and DDD of the cervical spine.     Plan:  - Recommendation: Conservative treatment  - Pain management-   PT- WBAT of the upper extremities. Proper body mechanics.  - Pt is orthopedically stable for discharge  - Case d/w Dr. Mae  - Follow up with Follow up with Dr Mae after discharge at: [phone]   also consider Follow up with Dr Quirino Chen in one week as oupt at: [phone]  if Dr. Mae unavailable.       ORTHO SPINE ATTENDING  I reviewed the hx and exam with PA and reviewed the CT of C Spine which shows C2 type 1 ondontoid fracture with C1/C2 rotatory subluxation with spondylosis with fracture appearing chronic. No neuro deficits on exam. Can use soft collar for symptomatic treatment as needed and analgesics. Follow up with spinal surgeon.    DONTE Mae MD 5

## 2021-12-06 NOTE — ED PROVIDER NOTE - PATIENT PORTAL LINK FT
You can access the FollowMyHealth Patient Portal offered by [facility] by registering at the following website: [email or web address]. By joining [employer]’s FollowMyHealth portal, you will also be able to view your health information using other applications (apps) compatible with our system.

## 2021-12-06 NOTE — ED ADULT NURSE NOTE - ED STAT RN HANDOFF DETAILS
Discharged back to facility as per MD order. Patient being picked up by ambulette in stable condition via wheelchair in no acute distress.

## 2021-12-06 NOTE — ED PROVIDER NOTE - CHPI ED SYMPTOMS NEG
no weakness, no trauma/no bladder dysfunction/no bowel dysfunction/no constipation/no numbness/no tingling

## 2022-03-02 ENCOUNTER — EMERGENCY (EMERGENCY)
Facility: HOSPITAL | Age: 62
LOS: 1 days | Discharge: HOME | End: 2022-03-02
Attending: EMERGENCY MEDICINE | Admitting: EMERGENCY MEDICINE
Payer: MEDICARE

## 2022-03-02 VITALS
TEMPERATURE: 99 F | DIASTOLIC BLOOD PRESSURE: 81 MMHG | HEIGHT: 71 IN | OXYGEN SATURATION: 98 % | WEIGHT: 149.91 LBS | HEART RATE: 87 BPM | SYSTOLIC BLOOD PRESSURE: 137 MMHG | RESPIRATION RATE: 16 BRPM

## 2022-03-02 DIAGNOSIS — Z78.9 OTHER SPECIFIED HEALTH STATUS: Chronic | ICD-10-CM

## 2022-03-02 PROCEDURE — 99284 EMERGENCY DEPT VISIT MOD MDM: CPT

## 2022-03-02 RX ORDER — IBUPROFEN 200 MG
600 TABLET ORAL ONCE
Refills: 0 | Status: COMPLETED | OUTPATIENT
Start: 2022-03-02 | End: 2022-03-02

## 2022-03-02 RX ORDER — METHOCARBAMOL 500 MG/1
1500 TABLET, FILM COATED ORAL ONCE
Refills: 0 | Status: COMPLETED | OUTPATIENT
Start: 2022-03-02 | End: 2022-03-02

## 2022-03-02 RX ORDER — LIDOCAINE 4 G/100G
1 CREAM TOPICAL ONCE
Refills: 0 | Status: COMPLETED | OUTPATIENT
Start: 2022-03-02 | End: 2022-03-02

## 2022-03-02 RX ORDER — ALBUTEROL 90 UG/1
1 AEROSOL, METERED ORAL ONCE
Refills: 0 | Status: COMPLETED | OUTPATIENT
Start: 2022-03-02 | End: 2022-03-02

## 2022-03-02 RX ADMIN — METHOCARBAMOL 1500 MILLIGRAM(S): 500 TABLET, FILM COATED ORAL at 21:15

## 2022-03-02 RX ADMIN — LIDOCAINE 1 PATCH: 4 CREAM TOPICAL at 21:16

## 2022-03-02 RX ADMIN — Medication 600 MILLIGRAM(S): at 21:16

## 2022-03-02 RX ADMIN — ALBUTEROL 1 PUFF(S): 90 AEROSOL, METERED ORAL at 21:16

## 2022-03-02 NOTE — ED PROVIDER NOTE - NSFOLLOWUPINSTRUCTIONS_ED_ALL_ED_FT
Follow up with your primary care doctor or clinics listed below if you do not have a doctor  [address] [address]  To make an appointment, call [phone]  [facility]  Address: [address] [place] [id number]  Appointment Center: [phone] ([phone])   Use the inhaler as needed  Return immediately for any new or worsening symptoms or any new concerns

## 2022-03-02 NOTE — ED ADULT NURSE NOTE - OBJECTIVE STATEMENT
Pt came in c/o of cp x 2 Pt came in c/o of neck pain x 2 months "I have a pinched nerve". Denies fever, chills, sob, cp, n/v/d. A&Ox3 speaking in full sentences. Ambulatory with cane at baseline

## 2022-03-02 NOTE — ED ADULT NURSE REASSESSMENT NOTE - NS ED NURSE REASSESS COMMENT FT1
Pt received from previous shift RN A&Ox4, spon resps on Ra unlabored, NAD. Pt laying in chair comfortably. Pending pain medications. Will medicate.

## 2022-03-02 NOTE — ED PROVIDER NOTE - PHYSICAL EXAMINATION
Physical Exam  GEN: Awake, alert, non-toxic appearing,  EYES: full EOMI,  ENT: External inspection normal, normal voice,   HEAD: atraumatic  NECK: FROM neck, supple,   CV: rrr, no edema  RESP: minimal end exp wheezing, speaking in full sentences, good air entry, no crackles, no rhonchi, no tachypnea, no hypoxia, no resp distress,  MSK: torres x 4, no midline c-spine tenderness  SKIN: no rash

## 2022-03-02 NOTE — ED PROVIDER NOTE - OBJECTIVE STATEMENT
61 yom pw sob around 4pm.  pt states he was out in the park playing chess, was cold outside, felt chest tightness w/ sob.  hx of copd, active smoker, not currently taking any medications.  no chest pain. breathing has improved now.  also c/o ongoing neck pain L>R, followed up by orthospine, was told "pinched nerve", scheduled for follow up tomorrow morning.  denies weakness/paresthesia to extremities.  no ha.  no trauma.

## 2022-03-02 NOTE — ED ADULT NURSE NOTE - DRUG PRE-SCREENING (DAST -1)
Patient confirmed upcoming vascular center visit scheduled for:  Date: 9/4/20  Location: [address] [place] [address], [address]                 [place] [address]     Patient was given the following instructions regarding COVID-19 screening and precautions:  - Please come alone to your appointment, if you should need someone to accompany you to your appointment they may be asked to wait in the car. Exceptions allow for one person to accompany a patient in consideration of a language barrier, a mobility issue, or if other cognitive concerns are present.  - You may park in the surface lot in front of the building or in the main parking structure  - Note that [employer] parking is still not operating.  - Upon arrival your temperature will be taken, you will again be asked the screening questions, and you will be provided with a mask to wear while you are in the building if you do not have one of your own.  - Please do not arrive more than 15 minutes prior to your appointment time; if you arrive to our campus earlier than that, please wait in your car until the appropriate time.     COVID-19 Screening:    Does the patient OR patient’s household members have any of the following symptoms?  • Temperature: Fever >100.0°F or >38.0°C?  No  • Respiratory symptoms: New or worsening cough, shortness of breath, or sore throat? No  • GI symptoms: New onset of nausea, vomiting or diarrhea?  No  • Miscellaneous: New onset of loss of taste or smell, chills, repeated shaking with chills, muscle pain or headache?  No  Has the patient or a household member tested positive for COVID-19 in the last 14 days?  No    Statement Selected

## 2022-03-02 NOTE — ED PROVIDER NOTE - CLINICAL SUMMARY MEDICAL DECISION MAKING FREE TEXT BOX
nad, hx of copd, active smoker, minimal end exp wheezing on exam,  no resp distress, no pedal edema, can trial of albuterol inhaler, chronic neck pain no trauma, no paresthesia, strength equal bl extremities, no midline tenderness, no trauma, +tenderness L trapezius, trial of analgesia, continue outpatient f/u in the AM,

## 2022-03-02 NOTE — ED PROVIDER NOTE - PATIENT PORTAL LINK FT
You can access the FollowMyHealth Patient Portal offered by [facility] by registering at the following website: [email or web address]. By joining [employer]’s FollowMyHealth portal, you will also be able to view your health information using other applications (apps) compatible with our system.

## 2022-03-02 NOTE — ED ADULT TRIAGE NOTE - CHIEF COMPLAINT QUOTE
BIBA c/o neck pain x 2 months. states he has an appt hari to see a spine specialist. requesting strong pain meds in triage.
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
## 2022-03-04 DIAGNOSIS — R06.2 WHEEZING: ICD-10-CM

## 2022-03-04 DIAGNOSIS — R06.02 SHORTNESS OF BREATH: ICD-10-CM
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
## 2022-03-04 DIAGNOSIS — J44.9 CHRONIC OBSTRUCTIVE PULMONARY DISEASE, UNSPECIFIED: ICD-10-CM

## 2022-03-04 DIAGNOSIS — G89.29 OTHER CHRONIC PAIN: ICD-10-CM
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
## 2022-03-04 DIAGNOSIS — M54.2 CERVICALGIA: ICD-10-CM

## 2022-03-04 DIAGNOSIS — R07.89 OTHER CHEST PAIN: ICD-10-CM
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
## 2022-07-05 PROBLEM — M19.90 UNSPECIFIED OSTEOARTHRITIS, UNSPECIFIED SITE: Chronic | Status: ACTIVE | Noted: 2021-12-06

## 2022-07-05 PROBLEM — M54.9 DORSALGIA, UNSPECIFIED: Chronic | Status: ACTIVE | Noted: 2021-12-06

## 2022-07-17 NOTE — ED ADULT TRIAGE NOTE - [facility] (M2)
EMERGENCY DEPARTMENT ENCOUNTER    CHIEF COMPLAINT  Chief Complaint: Fever/chills  History given by: Patient  History limited by: None  Room Number: 13/13  PMD: Berny Collazo MD      HPI:  Pt is a 50 y.o. female who presents complaining of fevers and chills that began 2 days ago that has been intermittent since onset.  The patient reports she has had associated nausea and vomiting with a headache that she says is related to dehydration and not being able to eat.  She denies any associated chest pain, shortness of breath, diarrhea/constipation, or cough/congestion.    Duration: Ongoing for the past 2 days  Onset: Gradual  Location: Generalized  Radiation: None  Quality: Dull/aching  Intensity/Severity: None  Progression: Worsening  Associated Symptoms: Headache, nausea/vomiting  Aggravating Factors: None  Alleviating Factors: None  Previous Episodes: None  Treatment before arrival: Tylenol cold and sinus    PAST MEDICAL HISTORY  Active Ambulatory Problems     Diagnosis Date Noted   • Family history of colonic polyps 04/23/2018   • Screening for colon cancer 04/23/2018   • Perimenopause 08/19/2020   • Left lumbar radiculopathy 03/11/2022     Resolved Ambulatory Problems     Diagnosis Date Noted   • Calculus of gallbladder without cholecystitis without obstruction 09/14/2020   • Gastroesophageal reflux disease without esophagitis 09/14/2020     Past Medical History:   Diagnosis Date   • Allergic rhinitis    • Anemia 2018   • Colon polyps    • Gallstones    • History of anemia 2018   • Migraine    • Ovarian cyst 2019   • Shoulder pain, left    • Type A blood, Rh positive    • Urinary tract infection 2021       PAST SURGICAL HISTORY  Past Surgical History:   Procedure Laterality Date   • CHOLECYSTECTOMY WITH INTRAOPERATIVE CHOLANGIOGRAM N/A 09/30/2020    Procedure: CHOLECYSTECTOMY LAPAROSCOPIC INTRAOPERATIVE CHOLANGIOGRAM;  Surgeon: Nish Alegre MD;  Location: [facility];  Service: General;   Laterality: N/A;   • COLONOSCOPY N/A 05/24/2018    Procedure: COLONOSCOPY INTO CECUM WITH HOT SNARE POLYPECTOMY;  Surgeon: Alberto Syed MD;  Location: [facility] ENDOSCOPY;  Service: Gastroenterology   • DILATION AND CURETTAGE, DIAGNOSTIC / THERAPEUTIC N/A    • ENDOSCOPY N/A 09/30/2020    Procedure: ESOPHAGOGASTRODUODENOSCOPY WITH BIOPSY;  Surgeon: Nish Alegre MD;  Location: [facility] MAIN OR;  Service: General;  Laterality: N/A;   • EPIDURAL Left 03/24/2022    Procedure: LUMBAR/SACRAL TRANSFORAMINAL EPIDURAL - lkely L5;  Surgeon: Will Osorio MD;  [place] [facility] [place]  Service: Pain Management;  Laterality: Left;   • INTRAUTERINE DEVICE INSERTION N/A     REMOVED 2-9-17, PARAGUARD   • LAPAROSCOPIC CHOLECYSTECTOMY  2020   • SHOULDER ARTHROSCOPY W/ ROTATOR CUFF REPAIR Left 08/20/2019    Procedure: LEFT SHOULDER ARTHROSCOPY, SUBACROMIAL DECOMPRESSION, AND LABRAL DEBRIDEMENT;  Surgeon: Berny Garcia MD;  Location: [facility] OR OSC;  Service: Orthopedics   • TRANSANAL HEMORRHOIDAL DE-ARTERIALIZATION N/A 2015    IR TREATMENTS       FAMILY HISTORY  Family History   Problem Relation Age of Onset   • Diabetes Mother    • Hyperlipidemia Mother    • Hypertension Mother    • Colon polyps Mother    • Diabetes Father    • Hyperlipidemia Father    • Hypertension Father    • Hodgkin's lymphoma Father    • Colon polyps Father    • Cancer Paternal Aunt         Colon   • Colon cancer Paternal Aunt    • Cancer Sister    • Gallbladder disease Sister    • No Known Problems Brother    • No Known Problems Daughter    • No Known Problems Son    • No Known Problems Maternal Grandmother    • No Known Problems Paternal Grandmother    • No Known Problems Maternal Aunt    • Breast cancer Cousin    • Diabetes Brother    • Diabetes Sister    • BRCA 1/2 Neg Hx    • Endometrial cancer Neg Hx    • Ovarian cancer Neg Hx    • Malig Hyperthermia Neg Hx        SOCIAL HISTORY  Social History     Socioeconomic History   • Marital  status:    Tobacco Use   • Smoking status: Former Smoker     Packs/day: 0.50     Years: 10.00     Pack years: 5.00     Types: Cigarettes     Start date: 1993     Quit date: 12/15/2012     Years since quittin.5   • Smokeless tobacco: Never Used   Vaping Use   • Vaping Use: Never used   Substance and Sexual Activity   • Alcohol use: Never   • Drug use: Never   • Sexual activity: Yes     Partners: Male     Birth control/protection: None     Comment: vasectomy       ALLERGIES  Patient has no known allergies.    REVIEW OF SYSTEMS  Review of Systems   Constitutional: Positive for chills and fever.   HENT: Negative for sore throat.    Eyes: Negative.    Respiratory: Negative for cough and shortness of breath.    Cardiovascular: Negative for chest pain.   Gastrointestinal: Positive for nausea and vomiting. Negative for abdominal pain and diarrhea.   Genitourinary: Negative for dysuria.   Musculoskeletal: Negative for neck pain.   Skin: Negative for rash.   Allergic/Immunologic: Negative.    Neurological: Positive for headaches. Negative for weakness and numbness.   Hematological: Negative.    Psychiatric/Behavioral: Negative.    All other systems reviewed and are negative.      PHYSICAL EXAM  ED Triage Vitals [22 1733]   Temp Heart Rate Resp BP SpO2   99.8 °F (37.7 °C) 93 16 -- 99 %      Temp src Heart Rate Source Patient Position BP Location FiO2 (%)   -- -- -- -- --       Physical Exam  Vitals and nursing note reviewed.   Constitutional:       General: She is not in acute distress.  HENT:      Head: Normocephalic and atraumatic.   Eyes:      Pupils: Pupils are equal, round, and reactive to light.   Cardiovascular:      Rate and Rhythm: Normal rate and regular rhythm.      Heart sounds: Normal heart sounds.   Pulmonary:      Effort: Pulmonary effort is normal. No respiratory distress.      Breath sounds: Normal breath sounds.   Abdominal:      Palpations: Abdomen is soft.      Tenderness: There is no  abdominal tenderness. There is no guarding or rebound.   Musculoskeletal:         General: Normal range of motion.      Cervical back: Normal range of motion and neck supple.   Skin:     General: Skin is warm and dry.      Findings: No rash.   Neurological:      Mental Status: She is alert and oriented to person, place, and time.      Sensory: Sensation is intact.   Psychiatric:         Mood and Affect: Mood and affect normal.         LAB RESULTS  Lab Results (last 24 hours)     ** No results found for the last 24 hours. **          I ordered the above labs and reviewed the results    RADIOLOGY  XR Chest 1 View   Final Result   No evidence for active disease in the chest.       This report was finalized on 7/16/2022 8:56 PM by Dr. Jaden Mcneill M.D.               I ordered the above noted radiological studies. Interpreted by radiologist.  Reviewed by me in PACS.       PROCEDURES  Procedures      PROGRESS AND CONSULTS     The patient was wearing a facemask upon entrance into the room and remained in such throughout their visit.  I was wearing PPE including a facemask, eye protection, as well as gloves at any point entering the room and throughout the visit    2220  On reevaluation, the patient is resting comfortably with stable vital signs and has no acute complaints.  I informed her that she is positive for COVID-19.  We will treat with oral Zofran and she has been given instructions to rest and treat the fever with Tylenol/ibuprofen.  She is to return if oxygen saturations fall below 90% or significant shortness of breath.  All questions have been answered and the patient is stable for discharge.      MEDICAL DECISION MAKING  Results were reviewed/discussed with the patient and they were also made aware of online access. Pt also made aware that some labs, such as cultures, will not be resulted during ER visit and follow up with PMD is necessary.     MDM  Number of Diagnoses or Management Options     Amount  and/or Complexity of Data Reviewed  Clinical lab tests: ordered and reviewed  Tests in the radiology section of CPT®: ordered and reviewed  Review and summarize past medical records: yes (Upon medical records review, the patient was last seen and evaluated on 8/14/2021 for COVID-19 exposure)  Independent visualization of images, tracings, or specimens: yes (Unremarkable chest x-ray)           DIAGNOSIS  Final diagnoses:   COVID-19 virus infection   Fever and chills   Nausea and vomiting, unspecified vomiting type       DISPOSITION  DISCHARGE    Patient discharged in stable condition.    Reviewed implications of results, diagnosis, meds, responsibility to follow up, warning signs and symptoms of possible worsening, potential complications and reasons to return to ER.    Patient/Family voiced understanding of above instructions.    Discussed plan for discharge, as there is no emergent indication for admission. Patient referred to primary care provider for BP management due to today's BP. Pt/family is agreeable and understands need for follow up and repeat testing.  Pt is aware that discharge does not mean that nothing is wrong but it indicates no emergency is present that requires admission and they must continue care with follow-up as given below or physician of their choice.     FOLLOW-UP  Berny Collazo MD  [address] [facility] [address]  [place] [id number] [phone]    Schedule an appointment as soon as possible for a visit            Medication List      New Prescriptions    ondansetron ODT 8 MG disintegrating tablet  Commonly known as: Zofran ODT  Place 1 tablet on the tongue Every 8 (Eight) Hours As Needed for Nausea or Vomiting.           Where to Get Your Medications      These medications were sent to [facility]  - [phone]  - [phone]   [address] [place], [place] [address] [address]    Phone: [phone]    · ondansetron ODT 8 MG disintegrating tablet           Latest Documented Vital Signs:  As of 00:32 EDT  BP- 100/67 HR- 71 Temp- 99.8 °F (37.7 °C) O2 sat- 100%         Raffy Greer MD  07/18/22 0032     2

## 2023-04-13 VITALS
DIASTOLIC BLOOD PRESSURE: 83 MMHG | HEIGHT: 71 IN | TEMPERATURE: 99 F | SYSTOLIC BLOOD PRESSURE: 118 MMHG | WEIGHT: 166.89 LBS | RESPIRATION RATE: 18 BRPM | OXYGEN SATURATION: 97 % | HEART RATE: 77 BPM

## 2023-04-13 PROCEDURE — 72131 CT LUMBAR SPINE W/O DYE: CPT | Mod: 26,MA

## 2023-04-13 PROCEDURE — 99285 EMERGENCY DEPT VISIT HI MDM: CPT

## 2023-04-13 PROCEDURE — 70450 CT HEAD/BRAIN W/O DYE: CPT | Mod: 26,MA

## 2023-04-13 PROCEDURE — 72125 CT NECK SPINE W/O DYE: CPT | Mod: 26,MA

## 2023-04-13 NOTE — ED ADULT NURSE NOTE - OTHER COMPLAINTS
Patient reports chronic back pain. Patient also reports fall with head injury today and reports drinking alcohol today.

## 2023-04-13 NOTE — ED ADULT NURSE REASSESSMENT NOTE - NS ED NURSE REASSESS COMMENT FT1
Called to CT scan to do CT scan for patient, but CT is unavailable at this time as per CT staff.   Let Pt aware the delay. Patient states understand it.  Denies any discomfort at this time. Sleeping on stretcher comfortably. Called to CT scan to do CT scan for patient, but CT is unavailable at this time as per CT staff.   Let Pt aware the delay. Patient states understand it.  Denies any discomfort at this time. Sleeping on stretcher comfortably.  Patient is alert and oriented, A&O4.

## 2023-04-13 NOTE — ED PROVIDER NOTE - OBJECTIVE STATEMENT
62-year-old presenting with head injury and lower back pain,  patient states that fell earlier today after drinking too much alcohol. Denies cp, Denies associated SOB, NVD, lightheaded, diaphoresis, palpitations, cough/rhinorrhea, No FMH CAD/clots/sudden death. No known prior cardiac w/u.

## 2023-04-13 NOTE — ED ADULT NURSE NOTE - NSICDXPASTMEDICALHX_GEN_ALL_CORE_FT
PAST MEDICAL HISTORY:  Arthritis     Chronic back pain     DJD (degenerative joint disease)     Thyroid disease

## 2023-04-13 NOTE — ED ADULT NURSE NOTE - OBJECTIVE STATEMENT
"I hurt my back, I drank too much."  Patient reports back injury s/p fall after drinking vodka today- denies daily alcohol use.  Asking for sandwich/juice.  No other complaints.

## 2023-04-13 NOTE — ED ADULT TRIAGE NOTE - OTHER COMPLAINTS
Patient reports chronic back pain. Patient also reports fall with head injury today and reports drinking alcohol. Patient reports chronic back pain. Patient also reports fall with head injury today and reports drinking alcohol today.

## 2023-04-13 NOTE — ED ADULT NURSE REASSESSMENT NOTE - NS ED NURSE REASSESS COMMENT FT1
Received patient for continuous care.  Pending CT scan.  Patient is sitting on chair comfortably. Received patient for continuous care.  Pending CT scan.

## 2023-04-13 NOTE — ED ADULT NURSE NOTE - NSICDXPASTSURGICALHX_GEN_ALL_CORE_FT
PAST SURGICAL HISTORY:  No pertinent past surgical history     No significant past surgical history

## 2023-04-13 NOTE — ED PROVIDER NOTE - CLINICAL SUMMARY MEDICAL DECISION MAKING FREE TEXT BOX
Patient has alcohol intoxication with head injury and lower back pain otherwise vital signs stable patient in no acute distress,  no other chest abdominal pain extremity pain will obtain imaging and allow to sober and reassess.

## 2023-04-13 NOTE — ED PROVIDER NOTE - PROGRESS NOTE DETAILS
imaging reviewed, CT head and cervical spine with no acute findings on prelim.  Patient lumbar reviewed with radiologist, chronic changes versus acute osteomyelitis discitis cannot be excluded.  Patient afebrile, no WBC count normal sed rate elevation.  Patient states symptoms have been chronic in nature.  Patient given medications including IV Toradol and Tylenol.  Seen by neurosurgery given CT findings.   discussed with patient for admission/discharge,   Patient states due to difficulty walking does not feel comfortable going home.  Will admit for further symptom control, rehab/PT, possible MRI.

## 2023-04-14 ENCOUNTER — TRANSCRIPTION ENCOUNTER (OUTPATIENT)
Age: 63
End: 2023-04-14

## 2023-04-14 ENCOUNTER — INPATIENT (INPATIENT)
Facility: HOSPITAL | Age: 63
LOS: 6 days | Discharge: HOME | DRG: 478 | End: 2023-04-21
Attending: STUDENT IN AN ORGANIZED HEALTH CARE EDUCATION/TRAINING PROGRAM | Admitting: INTERNAL MEDICINE
Payer: MEDICARE
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
## 2023-04-14 DIAGNOSIS — E03.9 HYPOTHYROIDISM, UNSPECIFIED: ICD-10-CM

## 2023-04-14 DIAGNOSIS — Z00.00 ENCOUNTER FOR GENERAL ADULT MEDICAL EXAMINATION WITHOUT ABNORMAL FINDINGS: ICD-10-CM

## 2023-04-14 DIAGNOSIS — M54.9 DORSALGIA, UNSPECIFIED: ICD-10-CM

## 2023-04-14 DIAGNOSIS — Z78.9 OTHER SPECIFIED HEALTH STATUS: Chronic | ICD-10-CM

## 2023-04-14 DIAGNOSIS — F10.10 ALCOHOL ABUSE, UNCOMPLICATED: ICD-10-CM

## 2023-04-14 LAB
ALBUMIN SERPL ELPH-MCNC: 3.7 G/DL — SIGNIFICANT CHANGE UP (ref 3.3–5)
ALP SERPL-CCNC: 82 U/L — SIGNIFICANT CHANGE UP (ref 40–120)
ALT FLD-CCNC: 13 U/L — SIGNIFICANT CHANGE UP (ref 10–45)
AMPHET UR-MCNC: NEGATIVE — SIGNIFICANT CHANGE UP
ANION GAP SERPL CALC-SCNC: 7 MMOL/L — SIGNIFICANT CHANGE UP (ref 5–17)
APPEARANCE UR: CLEAR — SIGNIFICANT CHANGE UP
APTT BLD: 31.1 SEC — SIGNIFICANT CHANGE UP (ref 27.5–35.5)
AST SERPL-CCNC: 24 U/L — SIGNIFICANT CHANGE UP (ref 10–40)
BACTERIA # UR AUTO: ABNORMAL /HPF
BARBITURATES UR SCN-MCNC: NEGATIVE — SIGNIFICANT CHANGE UP
BASOPHILS # BLD AUTO: 0.04 K/UL — SIGNIFICANT CHANGE UP (ref 0–0.2)
BASOPHILS NFR BLD AUTO: 0.6 % — SIGNIFICANT CHANGE UP (ref 0–2)
BENZODIAZ UR-MCNC: NEGATIVE — SIGNIFICANT CHANGE UP
BILIRUB SERPL-MCNC: 0.3 MG/DL — SIGNIFICANT CHANGE UP (ref 0.2–1.2)
BILIRUB UR-MCNC: NEGATIVE — SIGNIFICANT CHANGE UP
BUN SERPL-MCNC: 29 MG/DL — HIGH (ref 7–23)
CALCIUM SERPL-MCNC: 8.4 MG/DL — SIGNIFICANT CHANGE UP (ref 8.4–10.5)
CHLORIDE SERPL-SCNC: 106 MMOL/L — SIGNIFICANT CHANGE UP (ref 96–108)
CO2 SERPL-SCNC: 28 MMOL/L — SIGNIFICANT CHANGE UP (ref 22–31)
COCAINE METAB.OTHER UR-MCNC: POSITIVE
COLOR SPEC: YELLOW — SIGNIFICANT CHANGE UP
COMMENT - URINE: SIGNIFICANT CHANGE UP
CREAT ?TM UR-MCNC: 321 MG/DL — SIGNIFICANT CHANGE UP
CREAT SERPL-MCNC: 1.92 MG/DL — HIGH (ref 0.5–1.3)
CRP SERPL-MCNC: 7.8 MG/L — HIGH (ref 0–4)
DIFF PNL FLD: NEGATIVE — SIGNIFICANT CHANGE UP
EGFR: 39 ML/MIN/1.73M2 — LOW
EOSINOPHIL # BLD AUTO: 0.3 K/UL — SIGNIFICANT CHANGE UP (ref 0–0.5)
EOSINOPHIL NFR BLD AUTO: 4.3 % — SIGNIFICANT CHANGE UP (ref 0–6)
EPI CELLS # UR: SIGNIFICANT CHANGE UP /HPF (ref 0–5)
ERYTHROCYTE [SEDIMENTATION RATE] IN BLOOD: 13 MM/HR — SIGNIFICANT CHANGE UP
GLUCOSE SERPL-MCNC: 117 MG/DL — HIGH (ref 70–99)
GLUCOSE UR QL: NEGATIVE — SIGNIFICANT CHANGE UP
GRAN CASTS # UR COMP ASSIST: ABNORMAL /LPF
HCT VFR BLD CALC: 40 % — SIGNIFICANT CHANGE UP (ref 39–50)
HGB BLD-MCNC: 13 G/DL — SIGNIFICANT CHANGE UP (ref 13–17)
HYALINE CASTS # UR AUTO: SIGNIFICANT CHANGE UP /LPF (ref 0–2)
IMM GRANULOCYTES NFR BLD AUTO: 0.3 % — SIGNIFICANT CHANGE UP (ref 0–0.9)
INR BLD: 1.08 — SIGNIFICANT CHANGE UP (ref 0.88–1.16)
KETONES UR-MCNC: NEGATIVE — SIGNIFICANT CHANGE UP
LEUKOCYTE ESTERASE UR-ACNC: NEGATIVE — SIGNIFICANT CHANGE UP
LYMPHOCYTES # BLD AUTO: 1.93 K/UL — SIGNIFICANT CHANGE UP (ref 1–3.3)
LYMPHOCYTES # BLD AUTO: 27.9 % — SIGNIFICANT CHANGE UP (ref 13–44)
MCHC RBC-ENTMCNC: 27.1 PG — SIGNIFICANT CHANGE UP (ref 27–34)
MCHC RBC-ENTMCNC: 32.5 GM/DL — SIGNIFICANT CHANGE UP (ref 32–36)
MCV RBC AUTO: 83.3 FL — SIGNIFICANT CHANGE UP (ref 80–100)
METHADONE UR-MCNC: NEGATIVE — SIGNIFICANT CHANGE UP
MONOCYTES # BLD AUTO: 0.75 K/UL — SIGNIFICANT CHANGE UP (ref 0–0.9)
MONOCYTES NFR BLD AUTO: 10.9 % — SIGNIFICANT CHANGE UP (ref 2–14)
NEUTROPHILS # BLD AUTO: 3.87 K/UL — SIGNIFICANT CHANGE UP (ref 1.8–7.4)
NEUTROPHILS NFR BLD AUTO: 56 % — SIGNIFICANT CHANGE UP (ref 43–77)
NITRITE UR-MCNC: NEGATIVE — SIGNIFICANT CHANGE UP
NRBC # BLD: 0 /100 WBCS — SIGNIFICANT CHANGE UP (ref 0–0)
OPIATES UR-MCNC: NEGATIVE — SIGNIFICANT CHANGE UP
PCP SPEC-MCNC: SIGNIFICANT CHANGE UP
PCP UR-MCNC: NEGATIVE — SIGNIFICANT CHANGE UP
PH UR: 5.5 — SIGNIFICANT CHANGE UP (ref 5–8)
PLATELET # BLD AUTO: 252 K/UL — SIGNIFICANT CHANGE UP (ref 150–400)
POTASSIUM SERPL-MCNC: 4.1 MMOL/L — SIGNIFICANT CHANGE UP (ref 3.5–5.3)
POTASSIUM SERPL-SCNC: 4.1 MMOL/L — SIGNIFICANT CHANGE UP (ref 3.5–5.3)
PROT SERPL-MCNC: 6.8 G/DL — SIGNIFICANT CHANGE UP (ref 6–8.3)
PROT UR-MCNC: 100 MG/DL
PROTHROM AB SERPL-ACNC: 12.9 SEC — SIGNIFICANT CHANGE UP (ref 10.5–13.4)
RBC # BLD: 4.8 M/UL — SIGNIFICANT CHANGE UP (ref 4.2–5.8)
RBC # FLD: 14.7 % — HIGH (ref 10.3–14.5)
RBC CASTS # UR COMP ASSIST: < 5 /HPF — SIGNIFICANT CHANGE UP
SARS-COV-2 RNA SPEC QL NAA+PROBE: NEGATIVE — SIGNIFICANT CHANGE UP
SODIUM SERPL-SCNC: 141 MMOL/L — SIGNIFICANT CHANGE UP (ref 135–145)
SODIUM UR-SCNC: 56 MMOL/L — SIGNIFICANT CHANGE UP
SP GR SPEC: >=1.03 — SIGNIFICANT CHANGE UP (ref 1–1.03)
T4 FREE SERPL-MCNC: 0.74 NG/DL — LOW (ref 0.93–1.7)
THC UR QL: POSITIVE
TSH SERPL-MCNC: 8.38 UIU/ML — HIGH (ref 0.27–4.2)
UROBILINOGEN FLD QL: 0.2 E.U./DL — SIGNIFICANT CHANGE UP
WBC # BLD: 6.91 K/UL — SIGNIFICANT CHANGE UP (ref 3.8–10.5)
WBC # FLD AUTO: 6.91 K/UL — SIGNIFICANT CHANGE UP (ref 3.8–10.5)
WBC UR QL: < 5 /HPF — SIGNIFICANT CHANGE UP

## 2023-04-14 PROCEDURE — 99221 1ST HOSP IP/OBS SF/LOW 40: CPT

## 2023-04-14 PROCEDURE — 99223 1ST HOSP IP/OBS HIGH 75: CPT | Mod: GC

## 2023-04-14 PROCEDURE — 76770 US EXAM ABDO BACK WALL COMP: CPT | Mod: 26

## 2023-04-14 RX ORDER — THIAMINE MONONITRATE (VIT B1) 100 MG
1 TABLET ORAL
Qty: 0 | Refills: 0 | DISCHARGE
Start: 2023-04-14

## 2023-04-14 RX ORDER — ACETAMINOPHEN 500 MG
975 TABLET ORAL ONCE
Refills: 0 | Status: COMPLETED | OUTPATIENT
Start: 2023-04-14 | End: 2023-04-14

## 2023-04-14 RX ORDER — LEVOTHYROXINE SODIUM 125 MCG
112 TABLET ORAL DAILY
Refills: 0 | Status: DISCONTINUED | OUTPATIENT
Start: 2023-04-14 | End: 2023-04-21

## 2023-04-14 RX ORDER — GABAPENTIN 400 MG/1
100 CAPSULE ORAL
Refills: 0 | Status: DISCONTINUED | OUTPATIENT
Start: 2023-04-14 | End: 2023-04-21

## 2023-04-14 RX ORDER — FOLIC ACID 0.8 MG
1 TABLET ORAL
Qty: 0 | Refills: 0 | DISCHARGE
Start: 2023-04-14

## 2023-04-14 RX ORDER — KETOROLAC TROMETHAMINE 30 MG/ML
15 SYRINGE (ML) INJECTION ONCE
Refills: 0 | Status: DISCONTINUED | OUTPATIENT
Start: 2023-04-14 | End: 2023-04-14

## 2023-04-14 RX ORDER — HYDROMORPHONE HYDROCHLORIDE 2 MG/ML
0.25 INJECTION INTRAMUSCULAR; INTRAVENOUS; SUBCUTANEOUS ONCE
Refills: 0 | Status: DISCONTINUED | OUTPATIENT
Start: 2023-04-14 | End: 2023-04-14

## 2023-04-14 RX ORDER — LEVOTHYROXINE SODIUM 125 MCG
1 TABLET ORAL
Qty: 0 | Refills: 0 | DISCHARGE
Start: 2023-04-14

## 2023-04-14 RX ORDER — ONDANSETRON 8 MG/1
4 TABLET, FILM COATED ORAL EVERY 8 HOURS
Refills: 0 | Status: DISCONTINUED | OUTPATIENT
Start: 2023-04-14 | End: 2023-04-21

## 2023-04-14 RX ORDER — THIAMINE MONONITRATE (VIT B1) 100 MG
100 TABLET ORAL DAILY
Refills: 0 | Status: DISCONTINUED | OUTPATIENT
Start: 2023-04-14 | End: 2023-04-21

## 2023-04-14 RX ORDER — ENOXAPARIN SODIUM 100 MG/ML
40 INJECTION SUBCUTANEOUS EVERY 24 HOURS
Refills: 0 | Status: DISCONTINUED | OUTPATIENT
Start: 2023-04-14 | End: 2023-04-15

## 2023-04-14 RX ORDER — GABAPENTIN 400 MG/1
1 CAPSULE ORAL
Qty: 0 | Refills: 0 | DISCHARGE
Start: 2023-04-14

## 2023-04-14 RX ORDER — FOLIC ACID 0.8 MG
1 TABLET ORAL DAILY
Refills: 0 | Status: DISCONTINUED | OUTPATIENT
Start: 2023-04-14 | End: 2023-04-21

## 2023-04-14 RX ORDER — ACETAMINOPHEN 500 MG
650 TABLET ORAL EVERY 6 HOURS
Refills: 0 | Status: DISCONTINUED | OUTPATIENT
Start: 2023-04-14 | End: 2023-04-21

## 2023-04-14 RX ADMIN — GABAPENTIN 100 MILLIGRAM(S): 400 CAPSULE ORAL at 12:19

## 2023-04-14 RX ADMIN — Medication 1 TABLET(S): at 12:19

## 2023-04-14 RX ADMIN — HYDROMORPHONE HYDROCHLORIDE 0.25 MILLIGRAM(S): 2 INJECTION INTRAMUSCULAR; INTRAVENOUS; SUBCUTANEOUS at 17:30

## 2023-04-14 RX ADMIN — Medication 15 MILLIGRAM(S): at 02:51

## 2023-04-14 RX ADMIN — GABAPENTIN 100 MILLIGRAM(S): 400 CAPSULE ORAL at 19:08

## 2023-04-14 RX ADMIN — Medication 975 MILLIGRAM(S): at 02:51

## 2023-04-14 RX ADMIN — HYDROMORPHONE HYDROCHLORIDE 0.25 MILLIGRAM(S): 2 INJECTION INTRAMUSCULAR; INTRAVENOUS; SUBCUTANEOUS at 16:43

## 2023-04-14 RX ADMIN — ENOXAPARIN SODIUM 40 MILLIGRAM(S): 100 INJECTION SUBCUTANEOUS at 12:20

## 2023-04-14 RX ADMIN — Medication 1 MILLIGRAM(S): at 12:19

## 2023-04-14 RX ADMIN — Medication 100 MILLIGRAM(S): at 12:19

## 2023-04-14 NOTE — DISCHARGE NOTE PROVIDER - NSDCFUADDAPPT_GEN_ALL_CORE_FT
Please go to your follow up appointment with Dr. Jimnéez, your urologist on 4/21/23 at 11:40AM. Please call [phone] if you have further questions about this appointment.     Please make a follow up appointment with your primary care doctor within 2 weeks of leaving the hospital.  Please go to your follow up appointment with Dr. Jiménez, your urologist on 4/21/23 at 11:40AM. Please call [phone] if you have further questions about this appointment.     Please get evaluated by your primary care doctor within 2 weeks of leaving the hospital. As you told me by bedside; your living location has primary care doctors that evaluate you regularly. Please have them see you within 2 weeks of leaving the hospital. If you would like to follow up with [facility] primary care doctors instead, please call [phone] to get more information about this. Please go to your follow up appointment with Dr. Jiménez, your urologist on 4/21/23 at 11:40AM. Please call [phone] if you have further questions about this appointment.     Please get evaluated by your primary care doctor within 2 weeks of leaving the hospital. You have told me by beside that you would like to be set up with the [facility] primary care doctors; I have given them your information and they will reach out to your directly to schedule the appointment. If you have further questions about this process, please call [phone].    Please go to your follow up appointment with Dr. Jiménez, your infectious disease doctor. She will further explore the results of the test we did the day you left the hospital, what infections may be there, and what anti-infection medications you may need. She has been given your information, and her office will reach out to you directly to schedule the appointment. If you have further questions about this, please call [phone].

## 2023-04-14 NOTE — H&P ADULT - PROBLEM SELECTOR PLAN 1
presenting to ED with head injury and lower back pain after sustaining a fall earlier from drinking too much alcohol.  CTH unremarkable.   CT lumbar spine: with findings of "sclerotic endplate changes at L4-L5 with marked erosive changes which could represent chronic sequelae from advanced degenerative disease or remote discitis, but can not exclude acute discitis osteomyelitis."  -Pt AAOX3, no neuro deficit  -Fall protocol   -PT in AM  -Neurosurgery on board, no intervention at this time , f/u recs

## 2023-04-14 NOTE — DISCHARGE NOTE PROVIDER - HOSPITAL COURSE
#Discharge: do not delete    Patient is __ yo M/F with past medical history of _____ presented with _____, found to have _____ (one liner)    Hospital course (by problem):     Patient was discharged to: living facility    New medications: none  Changes to old medications: none  Medications that were stopped: none    Items to follow up as outpatient:    Physical exam at the time of discharge:       #Discharge: do not delete    62 year-old with no sig PMHx presenting to ED with head injury and lower back pain after sustaining a fall earlier from drinking too much alcohol  CT lumbar spine with findings of "sclerotic endplate changes at L4-L5 with marked erosive changes admitted on medicine for osteomyelitis work up.     #Chronic osteomyelitis.  MR lumbar showing discitis vs OM at L4-L5 with ventral enhancing epidural soft tissue compressing thecal sac and L5/S1 discitis/OM vs degenerative changes. Patient has hx of back injections for pain management, most recently about 1 month ago. No fever, leukocytosis. No hx of IVDU. CRP 7.8. No incontinence or weakness of the lower extremities.   -ID consulted; recommended PET scan    #Fall.  ·  Plan: presenting to ED with head injury and lower back pain after sustaining a fall i/s/o alcohol intoxication.   CTH unremarkable. CT lumbar spine: with findings of "sclerotic endplate changes at L4-L5 with marked erosive changes which could represent chronic sequelae from advanced degenerative disease or remote discitis, but can not exclude acute discitis osteomyelitis." Neurosurgery consulted--> no intervention.     #Acute kidney injury superimposed on CKD.  Patient admitted with Cr ~2, unknown baseline. No known hx of CKD. CT  with evidence of severe L sided hydronephrosis with thinning of the renal parenchyma. Appearance suggests a chronic UPJ obstruction. Correlation with prior imaging is recommended. L sided hydro also demonstrated on renal u/s with evidence of medical renal disease. No evidence of urinary retention (PVRs 0).   -urology following; rec outpatient f/u for probable chronic UPJ obstruction.    #Hypothyroidism.  Home dose synthroid 112mcg. TSH elevated to 8.4; free T4 0.74.  -c/w home med upon d/c with outpatient f/u to consider increasing synthroid dose.     Patient was discharged to: living facility    New medications: none  Changes to old medications: none  Medications that were stopped: none    Items to follow up as outpatient:  -hypothyroidism  -UPJ obstruction  -back pain    Physical exam at the time of discharge:       #Discharge: do not delete    62 year-old with no sig PMHx presenting to ED with head injury and lower back pain after sustaining a fall earlier from drinking too much alcohol  CT lumbar spine with findings of "sclerotic endplate changes at L4-L5 with marked erosive changes admitted on medicine for osteomyelitis work up.     #Chronic osteomyelitis.  MR lumbar showing discitis vs OM at L4-L5 with ventral enhancing epidural soft tissue compressing thecal sac and L5/S1 discitis/OM vs degenerative changes. Patient has hx of back injections for pain management, most recently about 1 month ago. No fever, leukocytosis. No hx of IVDU. CRP 7.8. No incontinence or weakness of the lower extremities.   -PET scan 4/19:   1. Focal uptake at L4-L5, within the disc space and adjacent endplates, predominantly at the posterior aspect of the inferior endplate of L4 extending into the left facet joint, with associated bony destruction, consistent with discitis/osteomyelitis in the appropriate clinical setting.  2. Mild, diffuse uptake along the peritoneum in the upper abdomen suggestive of peritonitis. Correlate clinically.  3. Severe left hydronephrosis with lack of activity in the left renal collecting system suggestive of poor renal function on the left side. The location of obstruction appears to be at the proximal ureter, possibly the ureteropelvic junction.    #Fall.  ·  Plan: presenting to ED with head injury and lower back pain after sustaining a fall i/s/o alcohol intoxication.   CTH unremarkable. CT lumbar spine: with findings of "sclerotic endplate changes at L4-L5 with marked erosive changes which could represent chronic sequelae from advanced degenerative disease or remote discitis, but can not exclude acute discitis osteomyelitis." Neurosurgery consulted--> no intervention.     #Acute kidney injury superimposed on CKD.  Patient admitted with Cr ~2, unknown baseline. No known hx of CKD. CT  with evidence of severe L sided hydronephrosis with thinning of the renal parenchyma. Appearance suggests a chronic UPJ obstruction. Correlation with prior imaging is recommended. L sided hydro also demonstrated on renal u/s with evidence of medical renal disease. No evidence of urinary retention (PVRs 0).   -urology following; rec outpatient f/u for probable chronic UPJ obstruction.    #Hypothyroidism.  Home dose synthroid 112mcg. TSH elevated to 8.4; free T4 0.74.  -c/w home med upon d/c with outpatient f/u to consider increasing synthroid dose.     Patient was discharged to: living facility    New medications: none  Changes to old medications: none  Medications that were stopped: none    Items to follow up as outpatient:  -hypothyroidism  -UPJ obstruction  -back pain    Physical exam at the time of discharge:       #Discharge: do not delete    62 year-old with no sig PMHx presenting to ED with head injury and lower back pain after sustaining a fall earlier from drinking too much alcohol  CT lumbar spine with findings of "sclerotic endplate changes at L4-L5 with marked erosive changes admitted on medicine for osteomyelitis work up.     #Chronic osteomyelitis.  MR lumbar showing discitis vs OM at L4-L5 with ventral enhancing epidural soft tissue compressing thecal sac and L5/S1 discitis/OM vs degenerative changes. Patient has hx of back injections for pain management, most recently about 1 month ago. No fever, leukocytosis. No hx of IVDU. CRP 7.8. No incontinence or weakness of the lower extremities.   -PET scan 4/19:   1. Focal uptake at L4-L5, within the disc space and adjacent endplates, predominantly at the posterior aspect of the inferior endplate of L4 extending into the left facet joint, with associated bony destruction, consistent with discitis/osteomyelitis in the appropriate clinical setting.  2. Mild, diffuse uptake along the peritoneum in the upper abdomen suggestive of peritonitis. Correlate clinically.  3. Severe left hydronephrosis with lack of activity in the left renal collecting system suggestive of poor renal function on the left side. The location of obstruction appears to be at the proximal ureter, possibly the ureteropelvic junction.    #Fall.  ·  Plan: presenting to ED with head injury and lower back pain after sustaining a fall i/s/o alcohol intoxication.   CTH unremarkable. CT lumbar spine: with findings of "sclerotic endplate changes at L4-L5 with marked erosive changes which could represent chronic sequelae from advanced degenerative disease or remote discitis, but can not exclude acute discitis osteomyelitis." Neurosurgery consulted--> no intervention.     #Acute kidney injury superimposed on CKD.  Patient admitted with Cr ~2, unknown baseline. No known hx of CKD. CT  with evidence of severe L sided hydronephrosis with thinning of the renal parenchyma. Appearance suggests a chronic UPJ obstruction. Correlation with prior imaging is recommended. L sided hydro also demonstrated on renal u/s with evidence of medical renal disease. No evidence of urinary retention (PVRs 0).   -urology following; rec outpatient f/u for probable chronic UPJ obstruction.    #Hypothyroidism.  Home dose synthroid 112mcg. TSH elevated to 8.4; free T4 0.74.  -c/w home med upon d/c with outpatient f/u to consider increasing synthroid dose.     Patient was discharged to: living facility    New medications: none  Changes to old medications: none  Medications that were stopped: none    Items to follow up as outpatient:  -hypothyroidism  -UPJ obstruction  -back pain       62 year-old with no PMHx presenting to ED with head injury and lower back pain after sustaining a fall earlier from drinking too much alcohol. CT lumbar spine with findings of "sclerotic endplate changes at L4-L5 with marked erosive changes admitted on medicine for osteomyelitis work up. MRI demonstrating discitis vs OM at L4-L5 with ventral enhancing epidural soft tissue compressing thecal sac and L5/S1 discitis/OM vs degenerative changes.  Of note patient has hx of back injections for pain management, most recently about 1 month ago. No fever, leukocytosis. No hx of IVDU. CRP 7.8. No incontinence or weakness of the lower extremities. PET scan 4/19 demonstrated evidence of discitis/osteomyelitis in L4-L5. Bone bx performed by IR; pt discharged pending results for speciation and sensitivity; ID will  f/u outpt with further recommendations for abx.    #Chronic osteomyelitis.  MR lumbar showing discitis vs OM at L4-L5 with ventral enhancing epidural soft tissue compressing thecal sac and L5/S1 discitis/OM vs degenerative changes. Patient has hx of back injections for pain management, most recently about 1 month ago. No fever, leukocytosis. No hx of IVDU. CRP 7.8. No incontinence or weakness of the lower extremities.   -PET scan 4/19:   1. Focal uptake at L4-L5, within the disc space and adjacent endplates, predominantly at the posterior aspect of the inferior endplate of L4 extending into the left facet joint, with associated bony destruction, consistent with discitis/osteomyelitis in the appropriate clinical setting.  2. Mild, diffuse uptake along the peritoneum in the upper abdomen suggestive of peritonitis. Correlate clinically.  3. Severe left hydronephrosis with lack of activity in the left renal collecting system suggestive of poor renal function on the left side. The location of obstruction appears to be at the proximal ureter, possibly the ureteropelvic junction.    #Fall.  ·  Plan: presenting to ED with head injury and lower back pain after sustaining a fall i/s/o alcohol intoxication.   CTH unremarkable. CT lumbar spine: with findings of "sclerotic endplate changes at L4-L5 with marked erosive changes which could represent chronic sequelae from advanced degenerative disease or remote discitis, but can not exclude acute discitis osteomyelitis." Neurosurgery consulted--> no intervention.     #Acute kidney injury superimposed on CKD.  Patient admitted with Cr ~2, unknown baseline. No known hx of CKD. CT  with evidence of severe L sided hydronephrosis with thinning of the renal parenchyma. Appearance suggests a chronic UPJ obstruction. Correlation with prior imaging is recommended. L sided hydro also demonstrated on renal u/s with evidence of medical renal disease. No evidence of urinary retention (PVRs 0).   -urology following; rec outpatient f/u for probable chronic UPJ obstruction.    #Hypothyroidism.  Home dose synthroid 112mcg. TSH elevated to 8.4; free T4 0.74.  -c/w home med upon d/c with outpatient f/u to consider increasing synthroid dose.     Patient was discharged to: living facility    New medications: none  Changes to old medications: none  Medications that were stopped: none    Items to follow up as outpatient:  -hypothyroidism  -UPJ obstruction  -back pain

## 2023-04-14 NOTE — H&P ADULT - NSHPREVIEWOFSYSTEMS_GEN_ALL_CORE
Pt denies HA, SOB, CP, abdominal pain, urinary/BM changes. denies Fever/chills night sweat, weight loss

## 2023-04-14 NOTE — CONSULT NOTE ADULT - SUBJECTIVE AND OBJECTIVE BOX
HISTORY OF PRESENT ILLNESS:   62 year-old with no sig PMHx presenting to ED with head injury and lower back pain after sustaining a fall earlier from drinking too much alcohol. Pt reports pain is localized to the lower back without radiation to lower extremities. CTH and CT c-spine unremarkable. CT lumbar spine with findings of "sclerotic endplate changes at L4-L5 with marked erosive changes which could represent chronic sequelae from advanced degenerative disease or remote discitis, but can not exclude acute discitis osteomyelitis." Pt denies trauma to back, abdominal pain, weakness of extremities, urinary/bowel incontinence or retention, paresthesias, SOB, CP, fevers, chills, N/V, recent infection.    PAST MEDICAL & SURGICAL HISTORY:  Arthritis    Thyroid disease    DJD (degenerative joint disease)    Chronic back pain    No significant past surgical history    No pertinent past surgical history    SOCIAL HISTORY:  Tobacco Use:  Denies  EtOH use: Daily  Substance: Denies    Allergies    No Known Allergies    Intolerances        REVIEW OF SYSTEMS  See HPI    MEDICATIONS:  Antibiotics:    Neuro:    Anticoagulation:    OTHER:    IVF:    Vital Signs Last 24 Hrs  T(C): 37 (13 Apr 2023 22:36), Max: 37.2 (13 Apr 2023 18:14)  T(F): 98.6 (13 Apr 2023 22:36), Max: 99 (13 Apr 2023 18:14)  HR: 78 (13 Apr 2023 22:36) (77 - 78)  BP: 113/75 (13 Apr 2023 22:36) (113/75 - 118/83)  BP(mean): --  RR: 18 (13 Apr 2023 22:36) (18 - 18)  SpO2: 95% (13 Apr 2023 22:36) (95% - 97%)    Parameters below as of 13 Apr 2023 18:14  Patient On (Oxygen Delivery Method): room air        PHYSICAL EXAM:  General: NAD, pt is comfortably sitting up in bed, on room air  Cardiovascular: RRR, normal S1 and S2   Respiratory: lungs CTAB, no wheezing, rhonchi, or crackles   GI: normoactive BS to auscultation, abd soft, NTND   Neuro: AAOx3, FC, speech fluent and clear, FC  CNII-CXII: PERRL 3mm briskly reactive, EOMI b/l, face symmetric, tongue midline  Motor: VALENZUELA x4 spontaneously, 5/5 strength in all extremities throughout b/l  Sensation intact to light touch throughout. No clonus, no dysmetria, no pronator drift  Extremities: distal pulses 2+ x4 symmetric      LABS:                        13.0   6.91  )-----------( 252      ( 14 Apr 2023 01:32 )             40.0           PT/INR - ( 14 Apr 2023 01:32 )   PT: 12.9 sec;   INR: 1.08          PTT - ( 14 Apr 2023 01:32 )  PTT:31.1 sec

## 2023-04-14 NOTE — CONSULT NOTE ADULT - ASSESSMENT
62 year-old with no sig PMHx presenting to ED with head injury and lower back pain after sustaining a fall earlier from drinking too much alcohol. Pt reports pain is localized to the lower back without radiation to lower extremities. CT lumbar spine with findings of "sclerotic endplate changes at L4-L5 with marked erosive changes which could represent chronic sequelae from advanced degenerative disease or remote discitis, but can not exclude acute discitis osteomyelitis." Pt denies trauma to back, abdominal pain, weakness of extremities, urinary/bowel incontinence or retention, paresthesias, SOB, CP, fevers, chills, N/V, recent infection.    PLAN:  -No neurosurgical intervention warranted at this time  -Recommend infectious workup  -Recommend MRI L-spine  -D/w Dr. Dumas 62 year-old with no sig PMHx presenting to ED with head injury and lower back pain after sustaining a fall earlier from drinking too much alcohol. Pt reports pain is localized to the lower back without radiation to lower extremities. CT lumbar spine with findings of "sclerotic endplate changes at L4-L5 with marked erosive changes which could represent chronic sequelae from advanced degenerative disease or remote discitis, but can not exclude acute discitis osteomyelitis." Pt denies trauma to back, abdominal pain, weakness of extremities, urinary/bowel incontinence or retention, paresthesias, SOB, CP, fevers, chills, N/V, recent infection.    PLAN:  -No neurosurgical intervention warranted at this time  -Recommend infectious workup  -MRI L-spine not indicated as findings appear chronic  -D/w Dr. Dumas

## 2023-04-14 NOTE — DISCHARGE NOTE PROVIDER - NSDCCPTREATMENT_GEN_ALL_CORE_FT
PRINCIPAL PROCEDURE  Procedure: CT scan  Findings and Treatment:      PRINCIPAL PROCEDURE  Procedure: CT scan  Findings and Treatment: CT Cervical: Chronic fracture of the odontoid tip with posterior and left lateral displacement of the body of C2, not significantly changed since 12/6/2021. The osseous structures are otherwise intact and without acute fracture. Stable left subluxation at the C1-C2 articulation and widening of the right lateral atlantodental interval. There is reversal of the expected cervical lordosis, which may be secondary to spasm or positioning. Mild anterolisthesis of C7 on T1. The vertebral body heights are preserved. Multilevel degenerative changes including marginal osteophytes, posterior osseous ridging, endplate sclerosis, subchondral cystic change, facet and uncovertebral arthropathy, and intervertebral disc narrowing. No large disc herniation is present. There is no severe central spinal canal stenosis or neural foramen narrowing. No prevertebral soft tissue swelling.  CT lumbar: 1.   No acute fracture. 2.   Sclerotic endplate changes at L4-L5 with marked erosive changes. Could represent chronic sequelae from advanced degenerative disease or remote discitis, but can not exclude acute discitis osteomyelitis based on this imaging appearance alone. Correlation with prior imaging and medical   history is recommended. Consider further evaluation with MRI lumbar spine with and without contrast, if clinically indicated.  3.   Innumerable subtle lytic lesions seen throughout the bones. Could represent chronic changes from osteoporosis or multiple myeloma. 4.   Severe left-sided hydronephrosis with thinning of the renal parenchyma. Appearance suggests a chronic UPJ obstruction. Correlation with prior imaging is recommended.      SECONDARY PROCEDURE  Procedure: MRI  Findings and Treatment: MRI Lumbar Spine: Limited exam secondary to motion. Findings suggestive of discitis/osteomyelitis at the L4/5 level with ventral enhancing epidural soft tissue compressing the thecal sac as well as additional predominantly paravertebral enhancing soft tissue (left greater than right) without discrete fluid collection. Additional   discitis/osteomyelitis at L5/S1 versus degenerative changes    Procedure: PET scan from cranial base to mid-thigh  Findings and Treatment:     Procedure: Limited ultrasound of kidney  Findings and Treatment: IMPRESSION:  1.  Severe left hydronephrosis.  2.  Left ureteral jet not definitively visualized on this examination.  3.  Increased echogenicity of the kidneys bilaterally, consistent with   medical renal disease.     PRINCIPAL PROCEDURE  Procedure: CT scan  Findings and Treatment: CT Cervical: Chronic fracture of the odontoid tip with posterior and left lateral displacement of the body of C2, not significantly changed since 12/6/2021. The osseous structures are otherwise intact and without acute fracture. Stable left subluxation at the C1-C2 articulation and widening of the right lateral atlantodental interval. There is reversal of the expected cervical lordosis, which may be secondary to spasm or positioning. Mild anterolisthesis of C7 on T1. The vertebral body heights are preserved. Multilevel degenerative changes including marginal osteophytes, posterior osseous ridging, endplate sclerosis, subchondral cystic change, facet and uncovertebral arthropathy, and intervertebral disc narrowing. No large disc herniation is present. There is no severe central spinal canal stenosis or neural foramen narrowing. No prevertebral soft tissue swelling.  CT lumbar: 1.   No acute fracture. 2.   Sclerotic endplate changes at L4-L5 with marked erosive changes. Could represent chronic sequelae from advanced degenerative disease or remote discitis, but can not exclude acute discitis osteomyelitis based on this imaging appearance alone. Correlation with prior imaging and medical   history is recommended. Consider further evaluation with MRI lumbar spine with and without contrast, if clinically indicated.  3.   Innumerable subtle lytic lesions seen throughout the bones. Could represent chronic changes from osteoporosis or multiple myeloma. 4.   Severe left-sided hydronephrosis with thinning of the renal parenchyma. Appearance suggests a chronic UPJ obstruction. Correlation with prior imaging is recommended.      SECONDARY PROCEDURE  Procedure: MRI  Findings and Treatment: MRI Lumbar Spine: Limited exam secondary to motion. Findings suggestive of discitis/osteomyelitis at the L4/5 level with ventral enhancing epidural soft tissue compressing the thecal sac as well as additional predominantly paravertebral enhancing soft tissue (left greater than right) without discrete fluid collection. Additional   discitis/osteomyelitis at L5/S1 versus degenerative changes    Procedure: PET scan from cranial base to mid-thigh  Findings and Treatment: 1. Focal uptake at L4-L5, within the disc space and adjacent endplates, predominantly at the posterior aspect of the inferior endplate of L4 extending into the left facet joint, with associated bony destruction, consistent with discitis/osteomyelitis in the appropriate clinical setting.  2. Mild, diffuse uptake along the peritoneum in the upper abdomen suggestive of peritonitis. Correlate clinically.  3. Severe left hydronephrosis with lack of activity in the left renal collecting system suggestive of poor renal function on the left side. The location of obstruction appears to be at the proximal ureter, possibly the ureteropelvic junction.    Procedure: Limited ultrasound of kidney  Findings and Treatment: IMPRESSION:  1.  Severe left hydronephrosis.  2.  Left ureteral jet not definitively visualized on this examination.  3.  Increased echogenicity of the kidneys bilaterally, consistent with   medical renal disease.

## 2023-04-14 NOTE — PATIENT PROFILE ADULT - FALL HARM RISK - DEVICES
Subjective  Patient tolerating liquids.  No nausea vomiting.  Positive flatus    I/O's urine output 1900 cc +6 times/24 hours.  BM x3/24 hours.  Patient reports stool remains loose.  Obese and      Current Facility-Administered Medications   Medication   • sulfamethoxazole-trimethoprim (BACTRIM DS,SEPTRA DS) 800-160 MG tablet 1 tablet   • metoPROLOL (LOPRESSOR) injection 5 mg   • dextrose 50 % injection 25 g   • dextrose 50 % injection 12.5 g   • dextrose 5 % infusion   • glucagon (GLUCAGEN) injection 1 mg   • dextrose (GLUTOSE) 40 % gel 15 g   • dextrose (GLUTOSE) 40 % gel 30 g   • insulin lispro (HumaLOG) correction dose   • hydrALAZINE (APRESOLINE) injection 10 mg   • ondansetron (ZOFRAN) injection 4 mg   • acetaminophen (TYLENOL) tablet 1,000 mg   • HYDROmorphone (DILAUDID) injection 0.3 mg   • sodium chloride 0.9 % flush bag 25 mL   • sodium chloride (PF) 0.9 % injection 2 mL   • heparin (porcine) injection 5,000 Units         Last Recorded Vitals  Temp:  [97.7 °F (36.5 °C)-98.4 °F (36.9 °C)] 98.4 °F (36.9 °C)  Pulse:  [59-80] 66  Resp:  [16-18] 16  BP: (145-170)/(65-78) 156/77   Body mass index is 36.03 kg/m².    Physical Exam  Patient is obese and in no acute distress.  Breathing is nonlabored.  Abdomen is soft, nondistended and nontender.  Incisions are healthy.  No evidence of infection or hernia.      Labs         Impression & Plan     79-year-old female with a history of perforated diverticulitis status post Trent's procedure and subsequent ostomy takedown and then subsequent hernia repair with mesh.  Patient presented with symptoms of a bowel obstruction.  She has had 2 previous small bowel obstructions in 2014 and 2018.  They resolved with conservative management.     This particular episode occurred after she ate a lot of vegetables  I suspect the patient probably had a buildup of a lot of fibrous material.     Patient started passing gas this morning.  Plan to clamp her NG tube.  Diarrhea is  expected as she is resolving her ileus.  If NG tube comes out tonight as expected she can start on clear liquid diet.    Today the patient is noted to be feeling little bit better.  Abdomen is soft and nontender.  No evidence    Tolerating liquid diet.  Advancing to regular diet.  Patient needs to follow-up with urology regarding distal ureteral mass.  Patient should be ready to go home from a general surgery standpoint tomorrow.                               Cane

## 2023-04-14 NOTE — DISCHARGE NOTE PROVIDER - CARE PROVIDERS DIRECT ADDRESSES
,[email or web address] ,DirectAddress_Unknown ,DirectAddress_Unknown,[email or web address]

## 2023-04-14 NOTE — DISCHARGE NOTE PROVIDER - NSDCCPCAREPLAN_GEN_ALL_CORE_FT
PRINCIPAL DISCHARGE DIAGNOSIS  Diagnosis: Back pain  Assessment and Plan of Treatment:      PRINCIPAL DISCHARGE DIAGNOSIS  Diagnosis: Chronic osteomyelitis  Assessment and Plan of Treatment: Osteomyelitis is a serious infection of the bone that can be either acute or chronic. It is an inflammatory process involving the bone and its structures caused by pyogenic organisms that spread through the bloodstream, fractures, or surgery. This is what we think is causing your pain in your lower back. We found some evidence of this sort of infection on some of the scans of your back that we did. This is why we took a sample of the bone in the area, to test to see what infections are there and what anti-infection medications are needed. Please go to your follow up appointments for further evaluation and to discuss the results of this procedure.

## 2023-04-14 NOTE — H&P ADULT - PROBLEM SELECTOR PLAN 3
Pt is on Synthroid but unclear dose .  Will get collateral from assisted living facility in AM Pt is on Synthroid but unclear dose .  Will get collateral from assisted living facility in AM      #CHITO  Creatining 1.9  f/u Urine lytes AM Pt is on Synthroid but unclear dose .  Will get collateral from assisted living facility in AM      #CHITO  Per pt had stent in kidney in the past which was removed . unknown time. Unknown baseline creatinine   - Creatinine 1.9  CT LS: Severe left-sided hydronephrosis with thinning of the renal parenchyma.  Appearance suggests a chronic UPJ obstruction  - f/u Urine lytes AM  -Urology consult AM

## 2023-04-14 NOTE — H&P ADULT - HISTORY OF PRESENT ILLNESS
62 year-old with no sig PMHx presenting to ED with head injury and lower back pain after sustaining a fall earlier from drinking too much alcohol. .Pt reports pain is localized to the lower back without radiation to lower extremities. Denies cp, SOB, NVD, lightheaded, diaphoresis, palpitations, cough/rhinorrhea, No known prior cardiac w/u. CT lumbar spine with findings of "sclerotic endplate changes at L4-L5 with marked erosive changes which could represent chronic sequelae from advanced degenerative disease or remote discitis, but can not exclude acute discitis osteomyelitis." neurosurgery consulted in the ED and  recommended no intervention.       ED course:   T 99, HR 77, /83, RR 18/97% On RA   Lab: CBC unremarkable   Creatining 1.92, CRP 7.8 EGFR 39  Imaging: CT neck No acute fracture or malalignment.  CTH: No acute intracranial hemorrhage or calvarial fracture.  CT lumbar spine:No acute fracture. Sclerotic endplate changes at L4-L5 with marked erosive changes.   Could represent chronic sequelae from advanced degenerative disease or remote  discitis, but can not exclude acute discitis osteomyelitis based on this imaging appearance alone. Correlation with prior imaging and medical  history is recommended. Consider further evaluation with MRI lumbar spine with and without  contrast, if clinically indicated. Innumerable subtle lytic lesions seen throughout the bones. Could  represent chronic changes from osteoporosis or multiple myeloma. Severe left-sided hydronephrosis with thinning of the renal parenchyma. Appearance suggests a chronic UPJ obstruction. Correlation with prior   imaging is recommended.     treatment: Toradol and Tylenol      Medications: Patient is leaving at assisted living facility . He did not know his medications and he requested contact assisted living facility to get medical record. Called facility x2 by nurse/resident, Their computer system was down, requested call back in AM after 7:30 AM to fax all medical records.

## 2023-04-14 NOTE — PATIENT PROFILE ADULT - FALL HARM RISK - HARM RISK INTERVENTIONS

## 2023-04-14 NOTE — PATIENT PROFILE ADULT - HAVE YOU EVER HAD A SEVERE ALLERGIC REACTION TO A PREVIOUS DOSE/COMPONENTS OF THE COVID-19 VACCINE OR ANOTHER VACCINE OR INJECTABLE MEDICATION? (E.G. REACTIONS REQUIRING TREATMENT WITH EPINEPHRINE OR HOSPITALIZATION)
===========================

Datetime: 2018 18:22

===========================

   

IP Adm Impression:  , intrauterine pregnancy

IP Chief Complaint Other:  abdominal pain 

IP Admit Plan:  Observation/Evaluation; Discharge home

Admit Comment, IP Provider:  CC: "abdominal pain"

   HPI: 35 YO  @ 31.6wks IUP (REBECCA 19 by u/s) presents to [facility] for abdominal pain. Pt states 
that the pain started suddenly around 3PM while pt was watching TV. No trauma. Pain is described as c
onstant in nature, but states that the severity has decreased since onset. Mild right now. No changes
 in diet, no n/v/d/c, denies dysuria, hematuria. Endorsing increase frequency in urination then previ
ously. Good FM, no ctx, no VB and no LOF.

      

   PMD: [place]

   ObHx: ; full term NVD 

   Gyn: Denies hx of STIs

   PMH: denies

   SurgH: denies

   FH: hx of HLD

   SH: denies ETOH, Smoking and illict drug use 

   Allergies: NKDA

   Meds: PNV

      

   PE:

   GEN: NAD, looks nervous

   Cardio: S1S2 no M/G/R

   Resp: clear breath sounds b/l

   Abdomen: gravid, NT, BS+ 

   Back: Neg CVA b/l

   Ext: no edema noted 

   Neuro: AAO x 3

   Vag: closed/thick/high 

   FM: 150, catagory I  

      

   A/P:  35 YO  @ 31.6wks IUP (REBECCA 19 by u/s) with abdominal pain, now resolving.

   VS stable, afebrile. Strip is reactive, no ctx seen.  

   -continue fetal monitor

   -UA

   -will assess and reevaluate

      

   Pt seen and examined by Dr. Roverto Wells, PGY I 

      

   Pt reevaluated. UA result unremarkable.

   Pt reports that she is feeling well. Pain has resolved.

   Plan: D/C Home.

   F/U With OB Clinic within 1 week.

   Labor instructions given. 

Pelvic Type - PN:  Adequate

Extremities - PN:  Normal

Abdomen - PN:  Normal

Back - PN:  Normal

Breast - PN:  Not Done

Lungs - PN:  Normal

Heart - PN:  Normal

Thyroid - PN:  Not Done

Neurologic - PN:  Normal

HEENT - PN:  Normal

General - PN:  Normal

FHR - Baseline A Provider:  150

EGA AdmitDate IP:  31.6

Vital Signs Provider:  Reviewed; Within Normal Limits

IP Chief Complaint:  Other

NICHD Variability Prov Fetus A:  Moderate 6-25bpm

NICHD Accel Fetus A IP Provider:  15X15

FHR Category Provider Fetus A:  Category I

Dilatation, Provider:  closed

Effacement, Provider:  thick 

Station, Provider:  high 

Genitourinary Exam:  Normal

DTRs - PN:  Not Done
No

## 2023-04-14 NOTE — H&P ADULT - PROBLEM SELECTOR PLAN 2
History of chronic back pain for years , CT lumbars as above, concern for osteomyelitis. less likely due to no fever, no chills, no weight loss, never IVDU.  no leukocytosis. CRP 7.8.   -No neurosurgical intervention warranted at this time  -Recommended by neurosurgery  infectious workup,  -Recommend MRI L-spine  -f/u BCX History of chronic back pain for years , CT lumbars as above, concern for osteomyelitis. less likely due to no fever, no chills, no weight loss, never IVDU.  no leukocytosis. CRP 7.8.   -No neurosurgical intervention warranted at this time  -Recommended by neurosurgery  infectious workup,  -Recommend MRI L-spine  -f/u BCX  -No need for antibiotics at this time since no leukocytosis no fever pending BCX   -ID consult if needed for osteomylitis History of chronic back pain for years , CT lumbars as above, concern for osteomyelitis. less likely due to no fever, no chills, no weight loss, never IVDU.  no leukocytosis. CRP 7.8. No BM/urinary incontinence  -No neurosurgical intervention warranted at this time  -Recommended by neurosurgery  infectious workup,  -Recommend MRI L-spine  -f/u BCX  -No need for antibiotics at this time since no leukocytosis no fever pending BCX   -ID consult if needed for osteomyelitis  -Pain control

## 2023-04-14 NOTE — H&P ADULT - NSHPSOCIALHISTORY_GEN_ALL_CORE
Weed weekly   ETOH twice a month (does not recall how much drinks)  Smoking cigarets daily 5/daily since he was 20 YO

## 2023-04-14 NOTE — H&P ADULT - ATTENDING COMMENTS
62 year old male with no significant PMHx who presented with acute on chronic lower back pain following a fall in setting of alcohol intoxication.      #Mechanical Fall   -CT-Head: negative    -CT-Lumbar: suggestive of advanced degenerative changes vs. remote discitis vs. acute OM   -NSGY consulted, no acute intervention  indicated   -MRI pending but low concern for infectious etiology (no leukocytosis, negative inflammatory markers, BCx pending)  -PT consulted    #Hypothyroidism   -TSH 8.38, follow up free T4  -continue levothyroxine 112mcg daily     #CHITO   -unclear baseline creatinine but no self-reported history of CKD   -follow up UA, urine electrolytes and renal US, PVR negative     #Severe Left Hydronephrosis    -reported stent placement in the past that was "removed too soon" at OSH   -urology consult, follow up recommendations   -follow up dedicated renal US    #Lytic Lesions  -incidental finding on CT  -obtain SPEP, UPEP and free light chains     #Alcohol Abuse   -CIWA protocol     DVT PPx: transition to [facility]    Dispo: pending PT

## 2023-04-14 NOTE — DISCHARGE NOTE PROVIDER - NSDCFUSCHEDAPPT_GEN_ALL_CORE_FT
Jose Manuel Jiménez  [facility] Physician [facility]  UROLOGY [address] [facility] [address]  Scheduled Appointment: 04/21/2023

## 2023-04-14 NOTE — H&P ADULT - ASSESSMENT
62 year-old with no sig PMHx presenting to ED with head injury and lower back pain after sustaining a fall earlier from drinking too much alcohol  CT lumbar spine with findings of "sclerotic endplate changes at L4-L5 with marked erosive changes admitted on medicine for osteomyelitis work up.

## 2023-04-14 NOTE — H&P ADULT - NSHPLABSRESULTS_GEN_ALL_CORE
.  LABS:                         13.0   6.91  )-----------( 252      ( 14 Apr 2023 01:32 )             40.0     04-14    141  |  106  |  29<H>  ----------------------------<  117<H>  4.1   |  28  |  1.92<H>    Ca    8.4      14 Apr 2023 01:32    TPro  6.8  /  Alb  3.7  /  TBili  0.3  /  DBili  x   /  AST  24  /  ALT  13  /  AlkPhos  82  04-14    PT/INR - ( 14 Apr 2023 01:32 )   PT: 12.9 sec;   INR: 1.08          PTT - ( 14 Apr 2023 01:32 )  PTT:31.1 sec              RADIOLOGY, EKG & ADDITIONAL TESTS: Reviewed.

## 2023-04-14 NOTE — DISCHARGE NOTE PROVIDER - ATTENDING DISCHARGE PHYSICAL EXAMINATION:
-Gen: NAD, resting in bed  -HEENT: EOMI, PERRL, no scleral icterus, no JVD  -CV: normal S1 and S2  -Lungs: CTABL, normal respiratory effort on RA  -Ab: soft NT, ND, normal BS  -Ext: no LE edema   -Neuro: no focal deficits

## 2023-04-14 NOTE — DISCHARGE NOTE PROVIDER - NSDCMRMEDTOKEN_GEN_ALL_CORE_FT
folic acid 1 mg oral tablet: 1 tab(s) orally once a day  gabapentin 100 mg oral capsule: 1 cap(s) orally  levothyroxine 112 mcg (0.112 mg) oral tablet: 1 tab(s) orally once a day  Multiple Vitamins oral tablet: 1 tab(s) orally once a day  thiamine 100 mg oral tablet: 1 tab(s) orally once a day   folic acid 1 mg oral tablet: 1 tab(s) orally once a day  gabapentin 100 mg oral capsule: 1 cap(s) orally 3 times a day  levothyroxine 112 mcg (0.112 mg) oral tablet: 1 tab(s) orally once a day  Multiple Vitamins oral tablet: 1 tab(s) orally once a day  thiamine 100 mg oral tablet: 1 tab(s) orally once a day

## 2023-04-14 NOTE — DISCHARGE NOTE PROVIDER - PROVIDER TOKENS
PROVIDER:[TOKEN:[[id number]],FOLLOWUP:[1 week]] PROVIDER:[TOKEN:[[id number]],SCHEDULEDAPPT:[04/21/2023],SCHEDULEDAPPTTIME:[11:40 AM]] PROVIDER:[TOKEN:[[id number]],SCHEDULEDAPPT:[04/21/2023],SCHEDULEDAPPTTIME:[11:40 AM]],PROVIDER:[TOKEN:[[id number]]]

## 2023-04-14 NOTE — CONSULT NOTE ADULT - ASSESSMENT
62 year-old with no sig PMHx presenting to ED with head injury and lower back pain after sustaining a fall earlier from drinking too much alcohol. He underwent a CT lumbar/spine on 4/14/23, the results severe left-sided hydronephrosis with thinning of the renal parenchyma. Appearance suggests a chronic UPJ obstruction. His Cr 1.92 ng/ml. Prostate 21 g, no PVR but bladder volume 79cc      Recs:  -patient asymptomatic at this time, no urgent interventions suggested  -If prior medical records could be obtained to see patients baseline Cr that would be appreciated   -His CT shows thinning of renal parenchyma and suggest chronic UPJ obstruction. Pending prior Cr review, most likely would recommend outpt workup for renal function (NM renal scan for example)   2.08

## 2023-04-14 NOTE — DISCHARGE NOTE PROVIDER - CARE PROVIDER_API CALL
Spenser Felix)  Urology  [address]  [place], [place] [id number]  Phone: [phone]  Fax: [phone]  Follow Up Time: 1 week   Jose Manuel Jiménez)  Urology  [address] [place] [address]  Phone: [phone]  Fax: [phone]  Scheduled Appointment: 04/21/2023 11:40 AM   Jose Manuel Jiménez)  Urology  [address] [place] [address]  Phone: [phone]  Fax: [phone]  Scheduled Appointment: 04/21/2023 11:40 AM    Julieta Jiménez)  Infectious Disease; Internal Medicine  [address] [place] [address]  Phone: [phone]  Fax: [phone]  Follow Up Time:

## 2023-04-14 NOTE — H&P ADULT - PROBLEM SELECTOR PLAN 4
Pt reports 2 times drinking in month but fell down when he was drinking ETOH.   -F/u toxicology report  -F/u Blood alcohol  -[facility] protocol

## 2023-04-14 NOTE — CONSULT NOTE ADULT - SUBJECTIVE AND OBJECTIVE BOX
HPI:  62 year-old with no sig PMHx presenting to ED with head injury and lower back pain after sustaining a fall earlier from drinking too much alcohol. .Pt reports pain is localized to the lower back without radiation to lower extremities. Denies cp, SOB, NVD, lightheaded, diaphoresis, palpitations, cough/rhinorrhea, No known prior cardiac w/u. CT lumbar spine with findings of "sclerotic endplate changes at L4-L5 with marked erosive changes which could represent chronic sequelae from advanced degenerative disease or remote discitis, but can not exclude acute discitis osteomyelitis." neurosurgery consulted in the ED and  recommended no intervention.     ED course:   T 99, HR 77, /83, RR 18/97% On RA   Lab: CBC unremarkable   Creatining 1.92, CRP 7.8 EGFR 39  Imaging: CT neck No acute fracture or malalignment.  CTH: No acute intracranial hemorrhage or calvarial fracture.  CT lumbar spine:No acute fracture. Sclerotic endplate changes at L4-L5 with marked erosive changes.   Could represent chronic sequelae from advanced degenerative disease or remote  discitis, but can not exclude acute discitis osteomyelitis based on this imaging appearance alone. Correlation with prior imaging and medical  history is recommended. Consider further evaluation with MRI lumbar spine with and without  contrast, if clinically indicated. Innumerable subtle lytic lesions seen throughout the bones. Could  represent chronic changes from osteoporosis or multiple myeloma. Severe left-sided hydronephrosis with thinning of the renal parenchyma. Appearance suggests a chronic UPJ obstruction. Correlation with prior   imaging is recommended.     treatment: Toradol and Tylenol    Medications: Patient is leaving at assisted living facility . He did not know his medications and he requested contact assisted living facility to get medical record. Called facility x2 by nurse/resident, Their computer system was down, requested call back in AM after 7:30 AM to fax all medical records.  (14 Apr 2023 04:35)    Consult Note:  62 year-old with no sig PMHx presenting to ED with head injury and lower back pain after sustaining a fall earlier from drinking too much alcohol. He underwent a CT lumbar/spine on 4/14/23, the results severe left-sided hydronephrosis with thinning of the renal parenchyma. Appearance suggests a chronic UPJ obstruction. His Cr 1.92 ng/ml. Patient reports he has a had ureteral stent in past during the pandemic. That he reports he had for several months and then it was removed. He reports no flank pain or urinary issues at this time.   Patient is a poor historian  He denies fever, chills, n/v, SOB or CP.     Vital Signs Last 24 Hrs  T(C): 37.2 (14 Apr 2023 12:31), Max: 37.2 (13 Apr 2023 18:14)  T(F): 99 (14 Apr 2023 12:31), Max: 99 (13 Apr 2023 18:14)  HR: 85 (14 Apr 2023 12:31) (74 - 90)  BP: 108/70 (14 Apr 2023 12:31) (108/70 - 130/70)  BP(mean): --  RR: 16 (14 Apr 2023 12:31) (16 - 18)  SpO2: 95% (14 Apr 2023 12:31) (94% - 97%)    Parameters below as of 14 Apr 2023 12:31  Patient On (Oxygen Delivery Method): room air      I&O's Summary    14 Apr 2023 07:01  -  14 Apr 2023 16:31  --------------------------------------------------------  IN: 0 mL / OUT: 460 mL / NET: -460 mL        PE:  Gen: NAD   Abd: soft  : Normal phallus   Back: No CVAT  LABS:                        13.0   6.91  )-----------( 252      ( 14 Apr 2023 01:32 )             40.0     04-14    141  |  106  |  29<H>  ----------------------------<  117<H>  4.1   |  28  |  1.92<H>    Ca    8.4      14 Apr 2023 01:32    TPro  6.8  /  Alb  3.7  /  TBili  0.3  /  DBili  x   /  AST  24  /  ALT  13  /  AlkPhos  82  04-14    PT/INR - ( 14 Apr 2023 01:32 )   PT: 12.9 sec;   INR: 1.08          PTT - ( 14 Apr 2023 01:32 )  PTT:31.1 sec  Cultures  Culture Results:   No growth at 12 hours (04-14 @ 02:23)  Culture Results:   No growth at 12 hours (04-14 @ 02:23)    A/P 62 year-old with no sig PMHx presenting to ED with head injury and lower back pain after sustaining a fall earlier from drinking too much alcohol. He underwent a CT lumbar/spine on 4/14/23, the results severe left-sided hydronephrosis with thinning of the renal parenchyma. Appearance suggests a chronic UPJ obstruction. His Cr 1.92 ng/ml.    -If prior medical records could be obtained to see patients baseline Cr that would help and patient reports he had prior stent?          HPI:  62 year-old with no sig PMHx presenting to ED with head injury and lower back pain after sustaining a fall earlier from drinking too much alcohol. .Pt reports pain is localized to the lower back without radiation to lower extremities. Denies cp, SOB, NVD, lightheaded, diaphoresis, palpitations, cough/rhinorrhea, No known prior cardiac w/u. CT lumbar spine with findings of "sclerotic endplate changes at L4-L5 with marked erosive changes which could represent chronic sequelae from advanced degenerative disease or remote discitis, but can not exclude acute discitis osteomyelitis." neurosurgery consulted in the ED and  recommended no intervention.     ED course:   T 99, HR 77, /83, RR 18/97% On RA   Lab: CBC unremarkable   Creatining 1.92, CRP 7.8 EGFR 39  Imaging: CT neck No acute fracture or malalignment.  CTH: No acute intracranial hemorrhage or calvarial fracture.  CT lumbar spine:No acute fracture. Sclerotic endplate changes at L4-L5 with marked erosive changes.   Could represent chronic sequelae from advanced degenerative disease or remote  discitis, but can not exclude acute discitis osteomyelitis based on this imaging appearance alone. Correlation with prior imaging and medical  history is recommended. Consider further evaluation with MRI lumbar spine with and without  contrast, if clinically indicated. Innumerable subtle lytic lesions seen throughout the bones. Could  represent chronic changes from osteoporosis or multiple myeloma. Severe left-sided hydronephrosis with thinning of the renal parenchyma. Appearance suggests a chronic UPJ obstruction. Correlation with prior   imaging is recommended.     treatment: Toradol and Tylenol    Medications: Patient is leaving at assisted living facility . He did not know his medications and he requested contact assisted living facility to get medical record. Called facility x2 by nurse/resident, Their computer system was down, requested call back in AM after 7:30 AM to fax all medical records.  (14 Apr 2023 04:35)    Consult Note:  62 year-old with no sig PMHx presenting to ED with head injury and lower back pain after sustaining a fall earlier from drinking too much alcohol. He underwent a CT lumbar/spine on 4/14/23, the results severe left-sided hydronephrosis with thinning of the renal parenchyma. Appearance suggests a chronic UPJ obstruction. His Cr 1.92 ng/ml. Patient reports he has a had ureteral stent in past during the pandemic. That he reports he had for several months and then it was removed. He reports no flank pain or urinary issues at this time.   Patient is a poor historian  He denies fever, chills, n/v, SOB or CP.     Vital Signs Last 24 Hrs  T(C): 37.2 (14 Apr 2023 12:31), Max: 37.2 (13 Apr 2023 18:14)  T(F): 99 (14 Apr 2023 12:31), Max: 99 (13 Apr 2023 18:14)  HR: 85 (14 Apr 2023 12:31) (74 - 90)  BP: 108/70 (14 Apr 2023 12:31) (108/70 - 130/70)  BP(mean): --  RR: 16 (14 Apr 2023 12:31) (16 - 18)  SpO2: 95% (14 Apr 2023 12:31) (94% - 97%)    Parameters below as of 14 Apr 2023 12:31  Patient On (Oxygen Delivery Method): room air      I&O's Summary    14 Apr 2023 07:01  -  14 Apr 2023 16:31  --------------------------------------------------------  IN: 0 mL / OUT: 460 mL / NET: -460 mL        PE:  Gen: NAD   Abd: soft  : Normal phallus   Back: No CVAT  LABS:                        13.0   6.91  )-----------( 252      ( 14 Apr 2023 01:32 )             40.0     04-14    141  |  106  |  29<H>  ----------------------------<  117<H>  4.1   |  28  |  1.92<H>    Ca    8.4      14 Apr 2023 01:32    TPro  6.8  /  Alb  3.7  /  TBili  0.3  /  DBili  x   /  AST  24  /  ALT  13  /  AlkPhos  82  04-14    PT/INR - ( 14 Apr 2023 01:32 )   PT: 12.9 sec;   INR: 1.08          PTT - ( 14 Apr 2023 01:32 )  PTT:31.1 sec  Cultures  Culture Results:   No growth at 12 hours (04-14 @ 02:23)  Culture Results:   No growth at 12 hours (04-14 @ 02:23)    A/P 62 year-old with no sig PMHx presenting to ED with head injury and lower back pain after sustaining a fall earlier from drinking too much alcohol. He underwent a CT lumbar/spine on 4/14/23, the results severe left-sided hydronephrosis with thinning of the renal parenchyma. Appearance suggests a chronic UPJ obstruction. His Cr 1.92 ng/ml.    -If prior medical records could be obtained to see patients baseline Cr.   -Patient currently with no flank pain   -His CT shows thinning of renal parenchyma and chronic UPJ obstruction. Would recommend outpt workup           HPI:  62 year-old with no sig PMHx presenting to ED with head injury and lower back pain after sustaining a fall earlier from drinking too much alcohol. .Pt reports pain is localized to the lower back without radiation to lower extremities. Denies cp, SOB, NVD, lightheaded, diaphoresis, palpitations, cough/rhinorrhea, No known prior cardiac w/u. CT lumbar spine with findings of "sclerotic endplate changes at L4-L5 with marked erosive changes which could represent chronic sequelae from advanced degenerative disease or remote discitis, but can not exclude acute discitis osteomyelitis." neurosurgery consulted in the ED and  recommended no intervention.     ED course:   T 99, HR 77, /83, RR 18/97% On RA   Lab: CBC unremarkable   Creatining 1.92, CRP 7.8 EGFR 39  Imaging: CT neck No acute fracture or malalignment.  CTH: No acute intracranial hemorrhage or calvarial fracture.  CT lumbar spine:No acute fracture. Sclerotic endplate changes at L4-L5 with marked erosive changes.   Could represent chronic sequelae from advanced degenerative disease or remote  discitis, but can not exclude acute discitis osteomyelitis based on this imaging appearance alone. Correlation with prior imaging and medical  history is recommended. Consider further evaluation with MRI lumbar spine with and without  contrast, if clinically indicated. Innumerable subtle lytic lesions seen throughout the bones. Could  represent chronic changes from osteoporosis or multiple myeloma. Severe left-sided hydronephrosis with thinning of the renal parenchyma. Appearance suggests a chronic UPJ obstruction. Correlation with prior   imaging is recommended.     treatment: Toradol and Tylenol    Medications: Patient is leaving at assisted living facility . He did not know his medications and he requested contact assisted living facility to get medical record. Called facility x2 by nurse/resident, Their computer system was down, requested call back in AM after 7:30 AM to fax all medical records.  (14 Apr 2023 04:35)    Consult Note:  62 year-old with no sig PMHx presenting to ED with head injury and lower back pain after sustaining a fall earlier from drinking too much alcohol. He underwent a CT lumbar/spine on 4/14/23, the results severe left-sided hydronephrosis with thinning of the renal parenchyma. Appearance suggests a chronic UPJ obstruction. His Cr 1.92 ng/ml. Patient reports he has a had ureteral stent in past during the pandemic. That he reports he had for several months and then it was removed. He reports no flank pain or urinary issues at this time.   Patient is a poor historian  He denies fever, chills, n/v, SOB or CP.     Vital Signs Last 24 Hrs  T(C): 37.2 (14 Apr 2023 12:31), Max: 37.2 (13 Apr 2023 18:14)  T(F): 99 (14 Apr 2023 12:31), Max: 99 (13 Apr 2023 18:14)  HR: 85 (14 Apr 2023 12:31) (74 - 90)  BP: 108/70 (14 Apr 2023 12:31) (108/70 - 130/70)  BP(mean): --  RR: 16 (14 Apr 2023 12:31) (16 - 18)  SpO2: 95% (14 Apr 2023 12:31) (94% - 97%)    Parameters below as of 14 Apr 2023 12:31  Patient On (Oxygen Delivery Method): room air      I&O's Summary    14 Apr 2023 07:01  -  14 Apr 2023 16:31  --------------------------------------------------------  IN: 0 mL / OUT: 460 mL / NET: -460 mL        PE:  Gen: NAD   Abd: soft  : Normal phallus   Back: No CVAT  LABS:                        13.0   6.91  )-----------( 252      ( 14 Apr 2023 01:32 )             40.0     04-14    141  |  106  |  29<H>  ----------------------------<  117<H>  4.1   |  28  |  1.92<H>    Ca    8.4      14 Apr 2023 01:32    TPro  6.8  /  Alb  3.7  /  TBili  0.3  /  DBili  x   /  AST  24  /  ALT  13  /  AlkPhos  82  04-14    PT/INR - ( 14 Apr 2023 01:32 )   PT: 12.9 sec;   INR: 1.08          PTT - ( 14 Apr 2023 01:32 )  PTT:31.1 sec  Cultures  Culture Results:   No growth at 12 hours (04-14 @ 02:23)  Culture Results:   No growth at 12 hours (04-14 @ 02:23)    A/P 62 year-old with no sig PMHx presenting to ED with head injury and lower back pain after sustaining a fall earlier from drinking too much alcohol. He underwent a CT lumbar/spine on 4/14/23, the results severe left-sided hydronephrosis with thinning of the renal parenchyma. Appearance suggests a chronic UPJ obstruction. His Cr 1.92 ng/ml.    -If prior medical records could be obtained to see patients baseline Cr that would be appreciated   -Patient currently with no flank pain   -His CT shows thinning of renal parenchyma and suggest chronic UPJ obstruction. Pending prior Cr review, most likely would recommend outpt workup   -Team will follow up tomorrow (4/15)          HPI:  62 year-old with no sig PMHx presenting to ED with head injury and lower back pain after sustaining a fall earlier from drinking too much alcohol. .Pt reports pain is localized to the lower back without radiation to lower extremities. Denies cp, SOB, NVD, lightheaded, diaphoresis, palpitations, cough/rhinorrhea, No known prior cardiac w/u. CT lumbar spine with findings of "sclerotic endplate changes at L4-L5 with marked erosive changes which could represent chronic sequelae from advanced degenerative disease or remote discitis, but can not exclude acute discitis osteomyelitis." neurosurgery consulted in the ED and  recommended no intervention.     ED course:   T 99, HR 77, /83, RR 18/97% On RA   Lab: CBC unremarkable   Creatining 1.92, CRP 7.8 EGFR 39  Imaging: CT neck No acute fracture or malalignment.  CTH: No acute intracranial hemorrhage or calvarial fracture.  CT lumbar spine:No acute fracture. Sclerotic endplate changes at L4-L5 with marked erosive changes.   Could represent chronic sequelae from advanced degenerative disease or remote  discitis, but can not exclude acute discitis osteomyelitis based on this imaging appearance alone. Correlation with prior imaging and medical  history is recommended. Consider further evaluation with MRI lumbar spine with and without  contrast, if clinically indicated. Innumerable subtle lytic lesions seen throughout the bones. Could  represent chronic changes from osteoporosis or multiple myeloma. Severe left-sided hydronephrosis with thinning of the renal parenchyma. Appearance suggests a chronic UPJ obstruction. Correlation with prior   imaging is recommended.     treatment: Toradol and Tylenol    Medications: Patient is leaving at assisted living facility . He did not know his medications and he requested contact assisted living facility to get medical record. Called facility x2 by nurse/resident, Their computer system was down, requested call back in AM after 7:30 AM to fax all medical records.  (14 Apr 2023 04:35)    Consult Note:  62 year-old with no sig PMHx presenting to ED with head injury and lower back pain after sustaining a fall earlier from drinking too much alcohol. He underwent a CT lumbar/spine on 4/14/23, the results severe left-sided hydronephrosis with thinning of the renal parenchyma. Appearance suggests a chronic UPJ obstruction. His Cr 1.92 ng/ml. Patient reports he has a had ureteral stent in past during the pandemic. That he reports he had for several months and then it was removed. He reports no flank pain or urinary issues at this time.   Patient is a poor historian  He denies fever, chills, n/v, SOB or CP.     Vital Signs Last 24 Hrs  T(C): 37.2 (14 Apr 2023 12:31), Max: 37.2 (13 Apr 2023 18:14)  T(F): 99 (14 Apr 2023 12:31), Max: 99 (13 Apr 2023 18:14)  HR: 85 (14 Apr 2023 12:31) (74 - 90)  BP: 108/70 (14 Apr 2023 12:31) (108/70 - 130/70)  BP(mean): --  RR: 16 (14 Apr 2023 12:31) (16 - 18)  SpO2: 95% (14 Apr 2023 12:31) (94% - 97%)    Parameters below as of 14 Apr 2023 12:31  Patient On (Oxygen Delivery Method): room air      I&O's Summary    14 Apr 2023 07:01  -  14 Apr 2023 16:31  --------------------------------------------------------  IN: 0 mL / OUT: 460 mL / NET: -460 mL        PE:  Gen: NAD   Abd: soft  : Normal phallus   Back: No CVAT  LABS:                        13.0   6.91  )-----------( 252      ( 14 Apr 2023 01:32 )             40.0     04-14    141  |  106  |  29<H>  ----------------------------<  117<H>  4.1   |  28  |  1.92<H>    Ca    8.4      14 Apr 2023 01:32    TPro  6.8  /  Alb  3.7  /  TBili  0.3  /  DBili  x   /  AST  24  /  ALT  13  /  AlkPhos  82  04-14    PT/INR - ( 14 Apr 2023 01:32 )   PT: 12.9 sec;   INR: 1.08          PTT - ( 14 Apr 2023 01:32 )  PTT:31.1 sec  Cultures  Culture Results:   No growth at 12 hours (04-14 @ 02:23)  Culture Results:   No growth at 12 hours (04-14 @ 02:23)

## 2023-04-14 NOTE — H&P ADULT - NSHPPHYSICALEXAM_GEN_ALL_CORE
Vital Signs Last 24 Hrs  T(C): 36.8 (04-14-23 @ 03:45), Max: 37.2 (04-13-23 @ 18:14)  T(F): 98.3 (04-14-23 @ 03:45), Max: 99 (04-13-23 @ 18:14)  HR: 82 (04-14-23 @ 03:45) (77 - 90)  BP: 130/70 (04-14-23 @ 03:45) (109/78 - 130/70)  BP(mean): --  RR: 18 (04-14-23 @ 03:45) (18 - 18)  SpO2: 94% (04-14-23 @ 03:45) (94% - 97%)      PHYSICAL EXAM:  GENERAL: Pt lying in bed comfortably in NAD  HEAD:  Atraumatic   EYES: EOMI, PERRL, conjunctiva and sclera clear  ENT: Moist mucous membranes  NECK: Supple, No JVD  CHEST/LUNG: Clear to auscultation bilaterally; No rales, rhonchi, wheezing or rubs. Unlabored respirations  HEART: Regular rate and rhythm; No murmurs, rubs, or gallops  ABDOMEN: Bowel sounds present; Soft, Nontender, Nondistended. No guarding or rigidity    EXTREMITIES:  2+ Peripheral Pulses, brisk capillary refill. No clubbing, cyanosis, or edema  NERVOUS SYSTEM:  Alert & Oriented X3, speech clear. Answers questions appropriately. Facial movements symmetrical, no facial droop, tongue protrusion midline. Full and equal 5/5 strength B/L upper and lower extremities. +reflexes B/L LE. Sensation intact. No motor drift. No deficits   MSK: FROM x 4 extremities , Back spine tenderness in T4-T5 and lumbar   SKIN: No rashes or lesions Vital Signs Last 24 Hrs  T(C): 36.8 (04-14-23 @ 03:45), Max: 37.2 (04-13-23 @ 18:14)  T(F): 98.3 (04-14-23 @ 03:45), Max: 99 (04-13-23 @ 18:14)  HR: 82 (04-14-23 @ 03:45) (77 - 90)  BP: 130/70 (04-14-23 @ 03:45) (109/78 - 130/70)  BP(mean): --  RR: 18 (04-14-23 @ 03:45) (18 - 18)  SpO2: 94% (04-14-23 @ 03:45) (94% - 97%)      PHYSICAL EXAM:  GENERAL: Pt lying in bed comfortably in NAD  HEAD:  Atraumatic   EYES: EOMI, PERRL, conjunctiva and sclera clear  ENT: Moist mucous membranes  NECK: Supple, No JVD  CHEST/LUNG: Clear to auscultation bilaterally; No rales, rhonchi, wheezing or rubs. Unlabored respirations  HEART: Regular rate and rhythm; No murmurs, rubs, or gallops  ABDOMEN: Bowel sounds present; Soft, Nontender, Nondistended. No guarding or rigidity    EXTREMITIES:  2+ Peripheral Pulses, brisk capillary refill. No clubbing, cyanosis, or edema  NERVOUS SYSTEM:  Alert & Oriented X3, speech clear. Answers questions appropriately. Facial movements symmetrical, no facial droop, tongue protrusion midline. Full and equal 5/5 strength B/L upper and lower extremities. +reflexes B/L LE. Sensation intact. No motor drift. No deficits   MSK: FROM x 4 extremities , Back spine tenderness in T4-T5 and lumbar

## 2023-04-15 LAB
ALBUMIN SERPL ELPH-MCNC: 3.2 G/DL — LOW (ref 3.3–5)
ALP SERPL-CCNC: 68 U/L — SIGNIFICANT CHANGE UP (ref 40–120)
ALT FLD-CCNC: 10 U/L — SIGNIFICANT CHANGE UP (ref 10–45)
ANION GAP SERPL CALC-SCNC: 6 MMOL/L — SIGNIFICANT CHANGE UP (ref 5–17)
ANION GAP SERPL CALC-SCNC: 7 MMOL/L — SIGNIFICANT CHANGE UP (ref 5–17)
AST SERPL-CCNC: 12 U/L — SIGNIFICANT CHANGE UP (ref 10–40)
BILIRUB SERPL-MCNC: 0.2 MG/DL — SIGNIFICANT CHANGE UP (ref 0.2–1.2)
BUN SERPL-MCNC: 30 MG/DL — HIGH (ref 7–23)
BUN SERPL-MCNC: 30 MG/DL — HIGH (ref 7–23)
CALCIUM SERPL-MCNC: 8.4 MG/DL — SIGNIFICANT CHANGE UP (ref 8.4–10.5)
CALCIUM SERPL-MCNC: 8.5 MG/DL — SIGNIFICANT CHANGE UP (ref 8.4–10.5)
CHLORIDE SERPL-SCNC: 103 MMOL/L — SIGNIFICANT CHANGE UP (ref 96–108)
CHLORIDE SERPL-SCNC: 105 MMOL/L — SIGNIFICANT CHANGE UP (ref 96–108)
CO2 SERPL-SCNC: 27 MMOL/L — SIGNIFICANT CHANGE UP (ref 22–31)
CO2 SERPL-SCNC: 28 MMOL/L — SIGNIFICANT CHANGE UP (ref 22–31)
CREAT SERPL-MCNC: 1.74 MG/DL — HIGH (ref 0.5–1.3)
CREAT SERPL-MCNC: 1.78 MG/DL — HIGH (ref 0.5–1.3)
CREATININE, URINE RESULT: 313 MG/DL — SIGNIFICANT CHANGE UP
EGFR: 43 ML/MIN/1.73M2 — LOW
EGFR: 44 ML/MIN/1.73M2 — LOW
ERYTHROCYTE [SEDIMENTATION RATE] IN BLOOD: 13 MM/HR — SIGNIFICANT CHANGE UP
GLUCOSE SERPL-MCNC: 120 MG/DL — HIGH (ref 70–99)
GLUCOSE SERPL-MCNC: 121 MG/DL — HIGH (ref 70–99)
HCT VFR BLD CALC: 39 % — SIGNIFICANT CHANGE UP (ref 39–50)
HCT VFR BLD CALC: 39.1 % — SIGNIFICANT CHANGE UP (ref 39–50)
HCV AB S/CO SERPL IA: 0.16 S/CO — SIGNIFICANT CHANGE UP (ref 0–0.99)
HCV AB SERPL-IMP: SIGNIFICANT CHANGE UP
HGB BLD-MCNC: 12.4 G/DL — LOW (ref 13–17)
HGB BLD-MCNC: 12.5 G/DL — LOW (ref 13–17)
MAGNESIUM SERPL-MCNC: 2.1 MG/DL — SIGNIFICANT CHANGE UP (ref 1.6–2.6)
MCHC RBC-ENTMCNC: 26.9 PG — LOW (ref 27–34)
MCHC RBC-ENTMCNC: 27.1 PG — SIGNIFICANT CHANGE UP (ref 27–34)
MCHC RBC-ENTMCNC: 31.8 GM/DL — LOW (ref 32–36)
MCHC RBC-ENTMCNC: 32 GM/DL — SIGNIFICANT CHANGE UP (ref 32–36)
MCV RBC AUTO: 84.6 FL — SIGNIFICANT CHANGE UP (ref 80–100)
MCV RBC AUTO: 84.6 FL — SIGNIFICANT CHANGE UP (ref 80–100)
NRBC # BLD: 0 /100 WBCS — SIGNIFICANT CHANGE UP (ref 0–0)
NRBC # BLD: 0 /100 WBCS — SIGNIFICANT CHANGE UP (ref 0–0)
PHOSPHATE SERPL-MCNC: 2.9 MG/DL — SIGNIFICANT CHANGE UP (ref 2.5–4.5)
PLATELET # BLD AUTO: 244 K/UL — SIGNIFICANT CHANGE UP (ref 150–400)
PLATELET # BLD AUTO: 254 K/UL — SIGNIFICANT CHANGE UP (ref 150–400)
POTASSIUM SERPL-MCNC: 3.8 MMOL/L — SIGNIFICANT CHANGE UP (ref 3.5–5.3)
POTASSIUM SERPL-MCNC: 3.8 MMOL/L — SIGNIFICANT CHANGE UP (ref 3.5–5.3)
POTASSIUM SERPL-SCNC: 3.8 MMOL/L — SIGNIFICANT CHANGE UP (ref 3.5–5.3)
POTASSIUM SERPL-SCNC: 3.8 MMOL/L — SIGNIFICANT CHANGE UP (ref 3.5–5.3)
PROT ?TM UR-MCNC: 100 MG/DL — HIGH (ref 0–12)
PROT ?TM UR-MCNC: 100 MG/DL — HIGH (ref 0–12)
PROT SERPL-MCNC: 5.7 G/DL — LOW (ref 6–8.3)
PROT SERPL-MCNC: 5.7 G/DL — LOW (ref 6–8.3)
PROT SERPL-MCNC: 6 G/DL — SIGNIFICANT CHANGE UP (ref 6–8.3)
PSA FLD-MCNC: 0.72 NG/ML — SIGNIFICANT CHANGE UP (ref 0–4)
RBC # BLD: 4.61 M/UL — SIGNIFICANT CHANGE UP (ref 4.2–5.8)
RBC # BLD: 4.62 M/UL — SIGNIFICANT CHANGE UP (ref 4.2–5.8)
RBC # FLD: 14.8 % — HIGH (ref 10.3–14.5)
RBC # FLD: 15 % — HIGH (ref 10.3–14.5)
SODIUM SERPL-SCNC: 137 MMOL/L — SIGNIFICANT CHANGE UP (ref 135–145)
SODIUM SERPL-SCNC: 139 MMOL/L — SIGNIFICANT CHANGE UP (ref 135–145)
WBC # BLD: 4.31 K/UL — SIGNIFICANT CHANGE UP (ref 3.8–10.5)
WBC # BLD: 4.48 K/UL — SIGNIFICANT CHANGE UP (ref 3.8–10.5)
WBC # FLD AUTO: 4.31 K/UL — SIGNIFICANT CHANGE UP (ref 3.8–10.5)
WBC # FLD AUTO: 4.48 K/UL — SIGNIFICANT CHANGE UP (ref 3.8–10.5)

## 2023-04-15 PROCEDURE — 99232 SBSQ HOSP IP/OBS MODERATE 35: CPT | Mod: GC

## 2023-04-15 PROCEDURE — 72158 MRI LUMBAR SPINE W/O & W/DYE: CPT | Mod: 26

## 2023-04-15 RX ORDER — ACETAMINOPHEN 500 MG
1000 TABLET ORAL ONCE
Refills: 0 | Status: COMPLETED | OUTPATIENT
Start: 2023-04-15 | End: 2023-04-16

## 2023-04-15 RX ORDER — HEPARIN SODIUM 5000 [USP'U]/ML
5000 INJECTION INTRAVENOUS; SUBCUTANEOUS EVERY 8 HOURS
Refills: 0 | Status: DISCONTINUED | OUTPATIENT
Start: 2023-04-15 | End: 2023-04-19

## 2023-04-15 RX ADMIN — HEPARIN SODIUM 5000 UNIT(S): 5000 INJECTION INTRAVENOUS; SUBCUTANEOUS at 22:43

## 2023-04-15 RX ADMIN — Medication 100 MILLIGRAM(S): at 12:33

## 2023-04-15 RX ADMIN — Medication 112 MICROGRAM(S): at 06:27

## 2023-04-15 RX ADMIN — GABAPENTIN 100 MILLIGRAM(S): 400 CAPSULE ORAL at 12:33

## 2023-04-15 RX ADMIN — GABAPENTIN 100 MILLIGRAM(S): 400 CAPSULE ORAL at 19:39

## 2023-04-15 RX ADMIN — GABAPENTIN 100 MILLIGRAM(S): 400 CAPSULE ORAL at 06:27

## 2023-04-15 RX ADMIN — Medication 1 MILLIGRAM(S): at 12:33

## 2023-04-15 RX ADMIN — HEPARIN SODIUM 5000 UNIT(S): 5000 INJECTION INTRAVENOUS; SUBCUTANEOUS at 14:37

## 2023-04-15 RX ADMIN — Medication 1 TABLET(S): at 12:33

## 2023-04-15 NOTE — PROGRESS NOTE ADULT - PROBLEM SELECTOR PLAN 1
presenting to ED with head injury and lower back pain after sustaining a fall earlier from drinking too much alcohol.  CTH unremarkable.   CT lumbar spine: with findings of "sclerotic endplate changes at L4-L5 with marked erosive changes which could represent chronic sequelae from advanced degenerative disease or remote discitis, but can not exclude acute discitis osteomyelitis."  -Pt AAOX3, no neuro deficit  -Fall protocol   -PT in AM  -Neurosurgery on board, no intervention at this time , f/u recs presenting to ED with head injury and lower back pain after sustaining a fall earlier from drinking too much alcohol.  CTH unremarkable. CT lumbar spine: with findings of "sclerotic endplate changes at L4-L5 with marked erosive changes which could represent chronic sequelae from advanced degenerative disease or remote discitis, but can not exclude acute discitis osteomyelitis."  -Fall protocol   -Neurosurgery on board, no intervention at this time , f/u recs  -[facility] protocol

## 2023-04-15 NOTE — PROGRESS NOTE ADULT - PROBLEM SELECTOR PLAN 4
Pt reports 2 times drinking in month but fell down when he was drinking ETOH.   -F/u toxicology report  -F/u Blood alcohol  -[facility] protocol Pt reports 2 times drinking in month but fell down when he was drinking ETOH.   -Utox positive for THC and cocaine   -[facility] protocol

## 2023-04-15 NOTE — PROGRESS NOTE ADULT - SUBJECTIVE AND OBJECTIVE BOX
REJI BLAKE, 62y, Male  MRN-[id number]  Patient is a 62y old  Male who presents with a chief complaint of Back pain (14 Apr 2023 16:27)    OVERNIGHT EVENTS:     SUBJECTIVE:    12 Point ROS Negative unless noted otherwise above.  -------------------------------------------------------------------------------  VITAL SIGNS:  Vital Signs Last 24 Hrs  T(C): 36.4 (15 Apr 2023 05:16), Max: 37.2 (14 Apr 2023 12:31)  T(F): 97.6 (15 Apr 2023 05:16), Max: 99 (14 Apr 2023 12:31)  HR: 68 (15 Apr 2023 05:16) (65 - 85)  BP: 96/63 (15 Apr 2023 05:16) (96/63 - 114/77)  RR: 18 (15 Apr 2023 05:16) (16 - 18)  SpO2: 95% (15 Apr 2023 05:16) (95% - 96%) RA    I&O's Summary    14 Apr 2023 07:01  -  15 Apr 2023 07:00  --------------------------------------------------------  IN: 0 mL / OUT: 1170 mL / NET: -1170 mL    PHYSICAL EXAM:  GENERAL: Pt lying in bed comfortably in NAD  HEAD:  Atraumatic   EYES: EOMI, PERRL, conjunctiva and sclera clear  ENT: Moist mucous membranes  NECK: Supple, No JVD  CHEST/LUNG: Clear to auscultation bilaterally; No rales, rhonchi, wheezing or rubs. Unlabored respirations  HEART: Regular rate and rhythm; No murmurs, rubs, or gallops  ABDOMEN: Bowel sounds present; Soft, Nontender, Nondistended. No guarding or rigidity    EXTREMITIES:  2+ Peripheral Pulses, brisk capillary refill. No clubbing, cyanosis, or edema  NERVOUS SYSTEM:  Alert & Oriented X3, speech clear. Answers questions appropriately. Facial movements symmetrical, no facial droop, tongue protrusion midline. Full and equal 5/5 strength B/L upper and lower extremities. +reflexes B/L LE. Sensation intact. No motor drift. No deficits   MSK: FROM x 4 extremities , Back spine tenderness in T4-T5 and lumbar    ALLERGIES:  No Known Allergies    MEDICATIONS  (STANDING):  enoxaparin Injectable 40 milliGRAM(s) SubCutaneous every 24 hours  folic acid 1 milliGRAM(s) Oral daily  gabapentin 100 milliGRAM(s) Oral <User Schedule>  levothyroxine 112 MICROGram(s) Oral daily  multivitamin 1 Tablet(s) Oral daily  thiamine 100 milliGRAM(s) Oral daily    MEDICATIONS  (PRN):  acetaminophen     Tablet .. 650 milliGRAM(s) Oral every 6 hours PRN Temp greater or equal to 38C (100.4F), Mild Pain (1 - 3)  ondansetron Injectable 4 milliGRAM(s) IV Push every 8 hours PRN Nausea and/or Vomiting      -------------------------------------------------------------------------------  LABS:                        12.5   4.48  )-----------( 244      ( 15 Apr 2023 05:30 )             39.1     04-14    141  |  106  |  29<H>  ----------------------------<  117<H>  4.1   |  28  |  1.92<H>    Ca    8.4      14 Apr 2023 01:32    TPro  6.8  /  Alb  3.7  /  TBili  0.3  /  DBili  x   /  AST  24  /  ALT  13  /  AlkPhos  82  04-14    LIVER FUNCTIONS - ( 14 Apr 2023 01:32 )  Alb: 3.7 g/dL / Pro: 6.8 g/dL / ALK PHOS: 82 U/L / ALT: 13 U/L / AST: 24 U/L / GGT: x           PT/INR - ( 14 Apr 2023 01:32 )   PT: 12.9 sec;   INR: 1.08   PTT - ( 14 Apr 2023 01:32 )  PTT:31.1 sec    Urinalysis Basic - ( 14 Apr 2023 19:12 )    Color: Yellow / Appearance: Clear / SG: >=1.030 / pH: x  Gluc: x / Ketone: NEGATIVE  / Bili: Negative / Urobili: 0.2 E.U./dL   Blood: x / Protein: 100 mg/dL / Nitrite: NEGATIVE   Leuk Esterase: NEGATIVE / RBC: < 5 /HPF / WBC < 5 /HPF   Sq Epi: x / Non Sq Epi: x / Bacteria: Many /HPF    CAPILLARY BLOOD GLUCOSE  POCT Blood Glucose.: 197 mg/dL (13 Apr 2023 18:26)    Culture - Blood (collected 14 Apr 2023 02:23)  Source: .Blood Blood-Peripheral  Preliminary Report (15 Apr 2023 04:00):    No growth at 1 day.    Culture - Blood (collected 14 Apr 2023 02:23)  Source: .Blood Blood-Peripheral  Preliminary Report (15 Apr 2023 04:00):    No growth at 1 day.    COVID-19 PCR: Negative (14 Apr 2023 01:32)    RADIOLOGY & ADDITIONAL TESTS: Reviewed.   REJI BLAKE, 62y, Male  MRN-[id number]  Patient is a 62y old  Male who presents with a chief complaint of Back pain (14 Apr 2023 16:27)    OVERNIGHT EVENTS: ALIRIO    SUBJECTIVE: Patient reports back pain is unchanged. Has been able to urinate.     12 Point ROS Negative unless noted otherwise above.  -------------------------------------------------------------------------------  VITAL SIGNS:  Vital Signs Last 24 Hrs  T(C): 36.4 (15 Apr 2023 05:16), Max: 37.2 (14 Apr 2023 12:31)  T(F): 97.6 (15 Apr 2023 05:16), Max: 99 (14 Apr 2023 12:31)  HR: 68 (15 Apr 2023 05:16) (65 - 85)  BP: 96/63 (15 Apr 2023 05:16) (96/63 - 114/77)  RR: 18 (15 Apr 2023 05:16) (16 - 18)  SpO2: 95% (15 Apr 2023 05:16) (95% - 96%) RA    I&O's Summary    14 Apr 2023 07:01  -  15 Apr 2023 07:00  --------------------------------------------------------  IN: 0 mL / OUT: 1170 mL / NET: -1170 mL    PHYSICAL EXAM:  GENERAL: Pt lying in bed comfortably in NAD  HEAD:  Atraumatic   EYES: EOMI, conjunctiva and sclera clear  ENT: Moist mucous membranes  NECK: Supple, No JVD  CHEST/LUNG: Clear to auscultation bilaterally; No rales, rhonchi, wheezing or rubs. Unlabored respirations  HEART: Regular rate and rhythm; No murmurs, rubs, or gallops  ABDOMEN: Bowel sounds present; Soft, Nontender, Nondistended. No guarding or rigidity    EXTREMITIES:  2+ Peripheral Pulses, brisk capillary refill. No clubbing, cyanosis, or edema  NERVOUS SYSTEM:  Alert & Oriented X3, speech clear. Answers questions appropriately. Facial movements symmetrical, no facial droop, tongue protrusion midline. Full and equal 5/5 strength B/L upper and lower extremities.   MSK: FROM x 4 extremities , Back spine tenderness in T4-T5 and lumbar, w/ radiation to R flank    ALLERGIES:  No Known Allergies    MEDICATIONS  (STANDING):  enoxaparin Injectable 40 milliGRAM(s) SubCutaneous every 24 hours  folic acid 1 milliGRAM(s) Oral daily  gabapentin 100 milliGRAM(s) Oral <User Schedule>  levothyroxine 112 MICROGram(s) Oral daily  multivitamin 1 Tablet(s) Oral daily  thiamine 100 milliGRAM(s) Oral daily    MEDICATIONS  (PRN):  acetaminophen     Tablet .. 650 milliGRAM(s) Oral every 6 hours PRN Temp greater or equal to 38C (100.4F), Mild Pain (1 - 3)  ondansetron Injectable 4 milliGRAM(s) IV Push every 8 hours PRN Nausea and/or Vomiting      -------------------------------------------------------------------------------  LABS:                        12.5   4.48  )-----------( 244      ( 15 Apr 2023 05:30 )             39.1     04-14    141  |  106  |  29<H>  ----------------------------<  117<H>  4.1   |  28  |  1.92<H>    Ca    8.4      14 Apr 2023 01:32    TPro  6.8  /  Alb  3.7  /  TBili  0.3  /  DBili  x   /  AST  24  /  ALT  13  /  AlkPhos  82  04-14    LIVER FUNCTIONS - ( 14 Apr 2023 01:32 )  Alb: 3.7 g/dL / Pro: 6.8 g/dL / ALK PHOS: 82 U/L / ALT: 13 U/L / AST: 24 U/L / GGT: x           PT/INR - ( 14 Apr 2023 01:32 )   PT: 12.9 sec;   INR: 1.08   PTT - ( 14 Apr 2023 01:32 )  PTT:31.1 sec    Urinalysis Basic - ( 14 Apr 2023 19:12 )    Color: Yellow / Appearance: Clear / SG: >=1.030 / pH: x  Gluc: x / Ketone: NEGATIVE  / Bili: Negative / Urobili: 0.2 E.U./dL   Blood: x / Protein: 100 mg/dL / Nitrite: NEGATIVE   Leuk Esterase: NEGATIVE / RBC: < 5 /HPF / WBC < 5 /HPF   Sq Epi: x / Non Sq Epi: x / Bacteria: Many /HPF    CAPILLARY BLOOD GLUCOSE  POCT Blood Glucose.: 197 mg/dL (13 Apr 2023 18:26)    Culture - Blood (collected 14 Apr 2023 02:23)  Source: .Blood Blood-Peripheral  Preliminary Report (15 Apr 2023 04:00):    No growth at 1 day.    Culture - Blood (collected 14 Apr 2023 02:23)  Source: .Blood Blood-Peripheral  Preliminary Report (15 Apr 2023 04:00):    No growth at 1 day.    COVID-19 PCR: Negative (14 Apr 2023 01:32)    RADIOLOGY & ADDITIONAL TESTS: Reviewed.

## 2023-04-15 NOTE — PROGRESS NOTE ADULT - PROBLEM SELECTOR PLAN 3
Pt is on Synthroid but unclear dose .  Will get collateral from assisted living facility in AM      #CHITO  Per pt had stent in kidney in the past which was removed . unknown time. Unknown baseline creatinine   - Creatinine 1.9  CT LS: Severe left-sided hydronephrosis with thinning of the renal parenchyma.  Appearance suggests a chronic UPJ obstruction  - f/u Urine lytes AM  -Urology consult AM Home dose synthroid 112mcg.   -TSH elevated; free T4 low   -c/w home med, f/u outpatient to consider increasing dose       #CHITO  Per pt had stent in kidney in the past which was removed . unknown time. Unknown baseline creatinine   CT LS: Severe left-sided hydronephrosis with thinning of the renal parenchyma.  Appearance suggests a chronic UPJ obstruction  -Urology consulted  - renal ultrasound w/ evidence of severe L sided hydro and medical renal disease  - trend SCr (improving)

## 2023-04-15 NOTE — PROGRESS NOTE ADULT - PROBLEM SELECTOR PLAN 2
History of chronic back pain for years , CT lumbars as above, concern for osteomyelitis. less likely due to no fever, no chills, no weight loss, never IVDU.  no leukocytosis. CRP 7.8. No BM/urinary incontinence  -No neurosurgical intervention warranted at this time  -Recommended by neurosurgery  infectious workup,  -Recommend MRI L-spine  -f/u BCX  -No need for antibiotics at this time since no leukocytosis no fever pending BCX   -ID consult if needed for osteomyelitis  -Pain control History of chronic back pain for years , CT lumbars as above, concern for osteomyelitis. less likely due to no fever, no chills, no weight loss, never IVDU.  no leukocytosis. CRP 7.8. No BM/urinary incontinence  -No neurosurgical intervention warranted at this time  -f/u MRI L-spine (ordered)  -f/u BCX  -No need for antibiotics at this time since no leukocytosis no fever pending BCX   -Pain control w/ home med gabapentin 100mg TID

## 2023-04-16 LAB
ANION GAP SERPL CALC-SCNC: 9 MMOL/L — SIGNIFICANT CHANGE UP (ref 5–17)
BUN SERPL-MCNC: 32 MG/DL — HIGH (ref 7–23)
CALCIUM SERPL-MCNC: 8.1 MG/DL — LOW (ref 8.4–10.5)
CHLORIDE SERPL-SCNC: 104 MMOL/L — SIGNIFICANT CHANGE UP (ref 96–108)
CO2 SERPL-SCNC: 25 MMOL/L — SIGNIFICANT CHANGE UP (ref 22–31)
CREAT SERPL-MCNC: 1.74 MG/DL — HIGH (ref 0.5–1.3)
CRP SERPL-MCNC: 3.7 MG/L — SIGNIFICANT CHANGE UP (ref 0–4)
EGFR: 44 ML/MIN/1.73M2 — LOW
ERYTHROCYTE [SEDIMENTATION RATE] IN BLOOD: 9 MM/HR — SIGNIFICANT CHANGE UP
GLUCOSE SERPL-MCNC: 96 MG/DL — SIGNIFICANT CHANGE UP (ref 70–99)
HCT VFR BLD CALC: 38.1 % — LOW (ref 39–50)
HGB BLD-MCNC: 12.2 G/DL — LOW (ref 13–17)
MAGNESIUM SERPL-MCNC: 2 MG/DL — SIGNIFICANT CHANGE UP (ref 1.6–2.6)
MCHC RBC-ENTMCNC: 26.8 PG — LOW (ref 27–34)
MCHC RBC-ENTMCNC: 32 GM/DL — SIGNIFICANT CHANGE UP (ref 32–36)
MCV RBC AUTO: 83.6 FL — SIGNIFICANT CHANGE UP (ref 80–100)
NRBC # BLD: 0 /100 WBCS — SIGNIFICANT CHANGE UP (ref 0–0)
PHOSPHATE SERPL-MCNC: 3.3 MG/DL — SIGNIFICANT CHANGE UP (ref 2.5–4.5)
PLATELET # BLD AUTO: 263 K/UL — SIGNIFICANT CHANGE UP (ref 150–400)
POTASSIUM SERPL-MCNC: 4.4 MMOL/L — SIGNIFICANT CHANGE UP (ref 3.5–5.3)
POTASSIUM SERPL-SCNC: 4.4 MMOL/L — SIGNIFICANT CHANGE UP (ref 3.5–5.3)
RBC # BLD: 4.56 M/UL — SIGNIFICANT CHANGE UP (ref 4.2–5.8)
RBC # FLD: 14.9 % — HIGH (ref 10.3–14.5)
SODIUM SERPL-SCNC: 138 MMOL/L — SIGNIFICANT CHANGE UP (ref 135–145)
WBC # BLD: 4.62 K/UL — SIGNIFICANT CHANGE UP (ref 3.8–10.5)
WBC # FLD AUTO: 4.62 K/UL — SIGNIFICANT CHANGE UP (ref 3.8–10.5)

## 2023-04-16 PROCEDURE — 99222 1ST HOSP IP/OBS MODERATE 55: CPT

## 2023-04-16 PROCEDURE — 99232 SBSQ HOSP IP/OBS MODERATE 35: CPT | Mod: GC

## 2023-04-16 RX ORDER — KETOROLAC TROMETHAMINE 30 MG/ML
15 SYRINGE (ML) INJECTION ONCE
Refills: 0 | Status: DISCONTINUED | OUTPATIENT
Start: 2023-04-16 | End: 2023-04-16

## 2023-04-16 RX ORDER — LIDOCAINE 4 G/100G
1 CREAM TOPICAL EVERY 24 HOURS
Refills: 0 | Status: DISCONTINUED | OUTPATIENT
Start: 2023-04-16 | End: 2023-04-21

## 2023-04-16 RX ORDER — TRAMADOL HYDROCHLORIDE 50 MG/1
25 TABLET ORAL EVERY 8 HOURS
Refills: 0 | Status: DISCONTINUED | OUTPATIENT
Start: 2023-04-16 | End: 2023-04-18

## 2023-04-16 RX ADMIN — Medication 15 MILLIGRAM(S): at 11:39

## 2023-04-16 RX ADMIN — TRAMADOL HYDROCHLORIDE 25 MILLIGRAM(S): 50 TABLET ORAL at 18:59

## 2023-04-16 RX ADMIN — HEPARIN SODIUM 5000 UNIT(S): 5000 INJECTION INTRAVENOUS; SUBCUTANEOUS at 07:03

## 2023-04-16 RX ADMIN — Medication 400 MILLIGRAM(S): at 01:07

## 2023-04-16 RX ADMIN — GABAPENTIN 100 MILLIGRAM(S): 400 CAPSULE ORAL at 07:03

## 2023-04-16 RX ADMIN — Medication 1 TABLET(S): at 11:20

## 2023-04-16 RX ADMIN — Medication 1000 MILLIGRAM(S): at 02:07

## 2023-04-16 RX ADMIN — HEPARIN SODIUM 5000 UNIT(S): 5000 INJECTION INTRAVENOUS; SUBCUTANEOUS at 13:37

## 2023-04-16 RX ADMIN — HEPARIN SODIUM 5000 UNIT(S): 5000 INJECTION INTRAVENOUS; SUBCUTANEOUS at 22:23

## 2023-04-16 RX ADMIN — GABAPENTIN 100 MILLIGRAM(S): 400 CAPSULE ORAL at 22:23

## 2023-04-16 RX ADMIN — Medication 112 MICROGRAM(S): at 07:03

## 2023-04-16 RX ADMIN — TRAMADOL HYDROCHLORIDE 25 MILLIGRAM(S): 50 TABLET ORAL at 17:59

## 2023-04-16 RX ADMIN — Medication 1 MILLIGRAM(S): at 11:20

## 2023-04-16 RX ADMIN — Medication 15 MILLIGRAM(S): at 11:24

## 2023-04-16 RX ADMIN — GABAPENTIN 100 MILLIGRAM(S): 400 CAPSULE ORAL at 13:37

## 2023-04-16 RX ADMIN — Medication 100 MILLIGRAM(S): at 11:20

## 2023-04-16 NOTE — PROGRESS NOTE ADULT - PROBLEM SELECTOR PLAN 2
History of chronic back pain for years , CT lumbars as above, concern for osteomyelitis. less likely due to no fever, no chills, no weight loss, never IVDU.  no leukocytosis. CRP 7.8. No BM/urinary incontinence  -No neurosurgical intervention warranted at this time  -f/u MRI L-spine (ordered) ;possible discitis? Seems unlikely given history of labs. ID consult

## 2023-04-16 NOTE — CONSULT NOTE ADULT - ASSESSMENT
61 yo M with chronic LBP admitted following fall.  MRI concerning for OM/discitis L4/L5 with ventral enhancing ST compressing thecal sac and enhancing paravertebral ST without discrete collection.  He is afebrile, WBC, ESR and CRP are WNL.  However, he has received numerous injections for chronic pain.  Is at risk for organisms that may not be associated with signs of acute inflammation, including fungi and relatively low virulence bacteria (eg Cutibacterium acnes).  Would not want to start po antibiotics without knowing if he has infection and the causative organism.  Would not know if the process was chronic and if he has infection, would be unlikely to start with po antibiotics.  Suggest:  - F/U blood cultures 4/14  - Gallium scan  - Continue off antibiotics  Recommendations discussed with primary team.  Will follow with you - team 1.

## 2023-04-16 NOTE — CONSULT NOTE ADULT - SUBJECTIVE AND OBJECTIVE BOX
HPI:  63 yo M h/o COVID 2020 with prolonged illness, chronic LBP admitted 4/14 s/p fall with head injury and LBP.  ID consult for possible spinal OM.  Per Mr. Healy, he has had chronic LBP since ~2010 that he at least in part relates to his prior employment ( – a lot of lifting).  Pain has been significantly worse since 2016 or 2017.  Pain does not radiate, no LE weakness.  He has been seen by pain management.  Received gabapentin, which he views as a Bandaid.  Has had multiple courses of spinal injections – previously cortisone, from which he never received significant relief.  On ~3/1 and 4/1, he received a new type of injection – is supposed to receive 3rd in early May.  He has not yet derived relief from these injections.  No fever, chills.  Has had very rare episodes of sweats, none drenching.  No perianal paresthesias, urinary retention or fecal incontinence.  Pain is currently 5/10 in severity.    He came to the ED on 4/13 following a fall, where he was afebrile.  WBC 6.9 with nl diff, ESR 13, CRP 7.8.  Labs notable for BUN 20, Cr 1.92, tox screen with THC and cocaine.  Head CT unremarkable, CT C spine with multilevel degenerative changes.  CT lumbar spine (w/o) with highly irregular endplate cystic and sclerotic changes at L4-L5.  L5-S1 with more chronic appearing deformity with interbody fusion.  L4-5 facet joints also with erosive change.  On 4/15, MRI w/wo showed findings suggestive of OM/discitis at L4-L5 with ventral enhancing epidural ST compressing thecal sac as well as additional predominantly paravertebral enhancing ST (L>R) with out discrete fluid collection.  Additional discitis/OM at L5-S1 vs. degenerative changes.  He has remained afebrile with nl WBC.  ID consult for recommendations for possible po antibiotics for “chronic OM.”      PAST MEDICAL & SURGICAL HISTORY:  Arthritis      Thyroid disease      DJD (degenerative joint disease)      Chronic back pain      No significant past surgical history      No pertinent past surgical history              MEDICATIONS  (STANDING):  folic acid 1 milliGRAM(s) Oral daily  gabapentin 100 milliGRAM(s) Oral <User Schedule>  heparin   Injectable 5000 Unit(s) SubCutaneous every 8 hours  levothyroxine 112 MICROGram(s) Oral daily  lidocaine   4% Patch 1 Patch Transdermal every 24 hours  multivitamin 1 Tablet(s) Oral daily  thiamine 100 milliGRAM(s) Oral daily    MEDICATIONS  (PRN):  acetaminophen     Tablet .. 650 milliGRAM(s) Oral every 6 hours PRN Temp greater or equal to 38C (100.4F), Mild Pain (1 - 3)  ondansetron Injectable 4 milliGRAM(s) IV Push every 8 hours PRN Nausea and/or Vomiting  traMADol 25 milliGRAM(s) Oral every 8 hours PRN Moderate Pain (4 - 6)      Allergies    No Known Allergies    Intolerances        SOCIAL HISTORY:  Born in [place], lives in an assisted living facility in [place].  Single, no children.  Former  for a storage facility.  Smokes 1/2 ppd cigarettes, drinks alcohol ~3 X weekly, smokes cannabis, denies other recreational drug use.    FAMILY HISTORY:  No pertinent family history in first degree relatives.    ROS:  No HA, photophobia, neck stiffness, rhinorrhea, sore throat, cough, CP, SOB, N, V, diarrhea, abd pain, dysuria, hematuria, frequency, muscle/joint symptoms, bruising/bleeding.    Vital Signs Last 24 Hrs  T(C): 36.9 (16 Apr 2023 12:03), Max: 36.9 (16 Apr 2023 05:19)  T(F): 98.4 (16 Apr 2023 12:03), Max: 98.4 (16 Apr 2023 05:19)  HR: 70 (16 Apr 2023 12:03) (63 - 72)  BP: 105/63 (16 Apr 2023 12:03) (105/63 - 117/76)  BP(mean): --  RR: 16 (16 Apr 2023 12:03) (16 - 18)  SpO2: 96% (16 Apr 2023 12:03) (96% - 96%)    Parameters below as of 16 Apr 2023 12:03  Patient On (Oxygen Delivery Method): room air        PE:  Alert, in no distress  HEENT:  NC, PERRL, sclerae anicteric, conjunctivae clear.  Sinuses nontender, no nasal exudate.  No buccal or pharyngeal lesions, erythema or exudate  Neck:  Supple, no adenopathy  Lungs:  Clear to auscultation  Cor:  RRR, S1, S2, no murmur appreciated  Abd:  Symmetric, normoactive BS.  Soft, nontender, no masses, guarding or rebound.  Liver and spleen not enlarged  Back:  Tender to palpation lower lumbar area  Extrem:  No cyanosis or edema.  Nl LE strength  Skin:  No rashes.    LABS:                        12.2   4.62  )-----------( 263      ( 16 Apr 2023 07:45 )             38.1     04-16    138  |  104  |  32<H>  ----------------------------<  96  4.4   |  25  |  1.74<H>    Ca    8.1<L>      16 Apr 2023 07:45  Phos  3.3     04-16  Mg     2.0     04-16    TPro  5.7<L>  /  Alb  x   /  TBili  x   /  DBili  x   /  AST  x   /  ALT  x   /  AlkPhos  x   04-15    Sedimentation Rate, Erythrocyte: 9: [facility] performs Erythrocyte Sedimentation Rate assay  utilizing the Westergren method mm/Hr (04.16.23 @ 07:45)    C-Reactive Protein, Serum: 3.7: Result confirmed by repeated analysis after passing specimen ID/integrity  check mg/L (04.16.23 @ 07:45)    C-Reactive Protein, Serum: 7.8 mg/L (04.14.23 @ 01:32)      MICROBIOLOGY:    Blood cultures:  4/14 X 2 - NGTD    RADIOLOGY & ADDITIONAL STUDIES:    < from: MR Lumbar Spine w/wo IV Cont (04.15.23 @ 17:55) >  FINDINGS: The MRI examination is limited by motion artifact. There is   hyperintense T2 signal within the L4/5 disc space with mild enhancement   along the margins. There is diffuse hypointense T1 and hyperintense T2   signal within the adjacent L4 and L5 endplates and vertebral bodies with   diffuse enhancement. The cortical irregularity along the endplates at   L4/5 is better seen prior lumbar spine CT. These findings are suggestive   of discitis/osteomyelitis. There is ventral epidural enhancing soft   tissue at the L4 and L5 levels indenting on the thecal sac (series 31   image 10 and series 34 images 35-36) without discrete fluid collection   may represent phlegmon. There is mild prevertebral enhancing soft tissue   with more pronounced paravertebral (left greater than right) enhancing   soft tissue (series 34 images 34-37). No discrete fluid collection is   identified within the soft tissue to suggest abscess. There is T2/STIR   signal abnormality within the dorsal paraspinal soft tissues suggestive   of edema.    There is Grade 2 anterolisthesis of L5 on S1 with severe loss of   intervertebral disc space height at L5/S. There also is T2 signal   abnormality with enhancement involving the residual L5/S1 disc space and   adjacent endplates. It is unclear whether this may represent additional   infection or alternatively degenerative changes given the anterolisthesis.    The remaining vertebral body heights and intervertebral disc spaces are   maintained. There is a left-sided subchondral cyst involving the L3   inferior endplate. The remaining vertebral body marrow signal appears   intact without enhancing lesions. The conus medullaris ends at L2/3 and   is low-lying.    IMPRESSION: Limited exam secondary to motion. Findings suggestive of   discitis/osteomyelitis at the L4/5 level with ventral enhancing epidural   soft tissue compressing the thecal sac as well as additional   predominantly paravertebral enhancing soft tissue (left greater than   right) without discrete fluid collection. Additional   discitis/osteomyelitis at L5/S1 versus degenerative changes.    --- End of Report ---    < end of copied text >

## 2023-04-16 NOTE — PROGRESS NOTE ADULT - PROBLEM SELECTOR PLAN 1
presenting to ED with head injury and lower back pain after sustaining a fall earlier from drinking too much alcohol.  CTH unremarkable. CT lumbar spine: with findings of "sclerotic endplate changes at L4-L5 with marked erosive changes which could represent chronic sequelae from advanced degenerative disease or remote discitis, but can not exclude acute discitis osteomyelitis."  -Fall protocol   -Neurosurgery on board, no intervention at this time ,

## 2023-04-16 NOTE — PROGRESS NOTE ADULT - PROBLEM SELECTOR PLAN 3
Home dose synthroid 112mcg.   -TSH elevated; free T4 low   -c/w home med, f/u outpatient to consider increasing dose       ?CHITO, likely more CKD, unclear baseline  Uro f/u outpatient

## 2023-04-16 NOTE — PROGRESS NOTE ADULT - SUBJECTIVE AND OBJECTIVE BOX
S: patient seen bedside. No acute complaints no events.     PHYSICAL EXAM:  GENERAL: Pt lying in bed comfortably in NAD  HEAD:  Atraumatic   EYES: EOMI, conjunctiva and sclera clear  ENT: Moist mucous membranes  NECK: Supple, No JVD  CHEST/LUNG: Clear to auscultation bilaterally; No rales, rhonchi, wheezing or rubs. Unlabored respirations  HEART: Regular rate and rhythm; No murmurs, rubs, or gallops  ABDOMEN: Bowel sounds present; Soft, Nontender, Nondistended. No guarding or rigidity    EXTREMITIES:  2+ Peripheral Pulses, brisk capillary refill. No clubbing, cyanosis, or edema  NERVOUS SYSTEM:  Alert & Oriented  MSK: FROM x 4 extremities , Back spine tenderness in T4-T5 and lumbar, w/ radiation to R flank

## 2023-04-17 DIAGNOSIS — M86.60 OTHER CHRONIC OSTEOMYELITIS, UNSPECIFIED SITE: ICD-10-CM

## 2023-04-17 DIAGNOSIS — N17.9 ACUTE KIDNEY FAILURE, UNSPECIFIED: ICD-10-CM

## 2023-04-17 LAB
% ALBUMIN: 55.3 % — SIGNIFICANT CHANGE UP
% ALPHA 1: 4 % — SIGNIFICANT CHANGE UP
% ALPHA 2: 10.5 % — SIGNIFICANT CHANGE UP
% BETA: 10.1 % — SIGNIFICANT CHANGE UP
% GAMMA: 20.1 % — SIGNIFICANT CHANGE UP
ALBUMIN SERPL ELPH-MCNC: 3.2 G/DL — LOW (ref 3.6–5.5)
ALBUMIN/GLOB SERPL ELPH: 1.3 RATIO — SIGNIFICANT CHANGE UP
ALPHA1 GLOB SERPL ELPH-MCNC: 0.2 G/DL — SIGNIFICANT CHANGE UP (ref 0.1–0.4)
ALPHA2 GLOB SERPL ELPH-MCNC: 0.6 G/DL — SIGNIFICANT CHANGE UP (ref 0.5–1)
ANION GAP SERPL CALC-SCNC: 8 MMOL/L — SIGNIFICANT CHANGE UP (ref 5–17)
B-GLOBULIN SERPL ELPH-MCNC: 0.6 G/DL — SIGNIFICANT CHANGE UP (ref 0.5–1)
BLD GP AB SCN SERPL QL: NEGATIVE — SIGNIFICANT CHANGE UP
BUN SERPL-MCNC: 37 MG/DL — HIGH (ref 7–23)
CALCIUM SERPL-MCNC: 7.9 MG/DL — LOW (ref 8.4–10.5)
CHLORIDE SERPL-SCNC: 104 MMOL/L — SIGNIFICANT CHANGE UP (ref 96–108)
CO2 SERPL-SCNC: 24 MMOL/L — SIGNIFICANT CHANGE UP (ref 22–31)
CREAT SERPL-MCNC: 2.02 MG/DL — HIGH (ref 0.5–1.3)
EGFR: 37 ML/MIN/1.73M2 — LOW
GAMMA GLOBULIN: 1.1 G/DL — SIGNIFICANT CHANGE UP (ref 0.6–1.6)
GLUCOSE SERPL-MCNC: 94 MG/DL — SIGNIFICANT CHANGE UP (ref 70–99)
HCT VFR BLD CALC: 36.7 % — LOW (ref 39–50)
HGB BLD-MCNC: 12 G/DL — LOW (ref 13–17)
INTERPRETATION SERPL IFE-IMP: SIGNIFICANT CHANGE UP
KAPPA LC SER QL IFE: 5.56 MG/DL — HIGH (ref 0.33–1.94)
KAPPA/LAMBDA FREE LIGHT CHAIN RATIO, SERUM: 1.4 RATIO — SIGNIFICANT CHANGE UP (ref 0.26–1.65)
LAMBDA LC SER QL IFE: 3.96 MG/DL — HIGH (ref 0.57–2.63)
MAGNESIUM SERPL-MCNC: 1.9 MG/DL — SIGNIFICANT CHANGE UP (ref 1.6–2.6)
MCHC RBC-ENTMCNC: 27.2 PG — SIGNIFICANT CHANGE UP (ref 27–34)
MCHC RBC-ENTMCNC: 32.7 GM/DL — SIGNIFICANT CHANGE UP (ref 32–36)
MCV RBC AUTO: 83.2 FL — SIGNIFICANT CHANGE UP (ref 80–100)
NRBC # BLD: 0 /100 WBCS — SIGNIFICANT CHANGE UP (ref 0–0)
PHOSPHATE SERPL-MCNC: 3 MG/DL — SIGNIFICANT CHANGE UP (ref 2.5–4.5)
PLATELET # BLD AUTO: 247 K/UL — SIGNIFICANT CHANGE UP (ref 150–400)
POTASSIUM SERPL-MCNC: 4.7 MMOL/L — SIGNIFICANT CHANGE UP (ref 3.5–5.3)
POTASSIUM SERPL-SCNC: 4.7 MMOL/L — SIGNIFICANT CHANGE UP (ref 3.5–5.3)
PROT PATTERN SERPL ELPH-IMP: SIGNIFICANT CHANGE UP
RBC # BLD: 4.41 M/UL — SIGNIFICANT CHANGE UP (ref 4.2–5.8)
RBC # FLD: 14.9 % — HIGH (ref 10.3–14.5)
RH IG SCN BLD-IMP: POSITIVE — SIGNIFICANT CHANGE UP
SODIUM SERPL-SCNC: 136 MMOL/L — SIGNIFICANT CHANGE UP (ref 135–145)
WBC # BLD: 4.47 K/UL — SIGNIFICANT CHANGE UP (ref 3.8–10.5)
WBC # FLD AUTO: 4.47 K/UL — SIGNIFICANT CHANGE UP (ref 3.8–10.5)

## 2023-04-17 PROCEDURE — 99233 SBSQ HOSP IP/OBS HIGH 50: CPT | Mod: GC

## 2023-04-17 PROCEDURE — 99232 SBSQ HOSP IP/OBS MODERATE 35: CPT | Mod: GC

## 2023-04-17 RX ADMIN — Medication 650 MILLIGRAM(S): at 22:16

## 2023-04-17 RX ADMIN — GABAPENTIN 100 MILLIGRAM(S): 400 CAPSULE ORAL at 19:41

## 2023-04-17 RX ADMIN — GABAPENTIN 100 MILLIGRAM(S): 400 CAPSULE ORAL at 06:13

## 2023-04-17 RX ADMIN — GABAPENTIN 100 MILLIGRAM(S): 400 CAPSULE ORAL at 13:32

## 2023-04-17 RX ADMIN — Medication 1 MILLIGRAM(S): at 13:32

## 2023-04-17 RX ADMIN — TRAMADOL HYDROCHLORIDE 25 MILLIGRAM(S): 50 TABLET ORAL at 01:37

## 2023-04-17 RX ADMIN — TRAMADOL HYDROCHLORIDE 25 MILLIGRAM(S): 50 TABLET ORAL at 02:37

## 2023-04-17 RX ADMIN — HEPARIN SODIUM 5000 UNIT(S): 5000 INJECTION INTRAVENOUS; SUBCUTANEOUS at 13:32

## 2023-04-17 RX ADMIN — Medication 1 TABLET(S): at 13:31

## 2023-04-17 RX ADMIN — Medication 112 MICROGRAM(S): at 06:13

## 2023-04-17 RX ADMIN — Medication 100 MILLIGRAM(S): at 13:31

## 2023-04-17 RX ADMIN — Medication 650 MILLIGRAM(S): at 23:16

## 2023-04-17 RX ADMIN — HEPARIN SODIUM 5000 UNIT(S): 5000 INJECTION INTRAVENOUS; SUBCUTANEOUS at 06:13

## 2023-04-17 RX ADMIN — HEPARIN SODIUM 5000 UNIT(S): 5000 INJECTION INTRAVENOUS; SUBCUTANEOUS at 22:16

## 2023-04-17 NOTE — PROGRESS NOTE ADULT - PROBLEM SELECTOR PLAN 1
presenting to ED with head injury and lower back pain after sustaining a fall earlier from drinking too much alcohol.  CTH unremarkable. CT lumbar spine: with findings of "sclerotic endplate changes at L4-L5 with marked erosive changes which could represent chronic sequelae from advanced degenerative disease or remote discitis, but can not exclude acute discitis osteomyelitis."  -Fall protocol   -Neurosurgery on board, no intervention at this time , MR lumbar showing disciitis vs M at L4-L5 with ventral enhancing epidural soft tissue compressing theacal sac and L5/S1 disciitis/OM vs degenerative changes. Patient has hx of back injections for pain management, most recently about 1 month ago.   -ID consulted; f/u recs  -ordered gallium scan but no gallium available this week per NM

## 2023-04-17 NOTE — PROGRESS NOTE ADULT - ASSESSMENT
61 yo M with chronic LBP admitted following fall.  MRI concerning for OM/discitis L4/L5 with ventral enhancing ST compressing thecal sac and enhancing paravertebral ST without discrete collection.  He is afebrile, WBC, ESR and CRP are WNL.  However, he has received numerous injections for chronic pain.  Is at risk for organisms that may not be associated with signs of acute inflammation, including fungi and relatively low virulence bacteria (eg Cutibacterium acnes).  Would not want to start po antibiotics without knowing if he has infection and the causative organism.  Would not know if the process was chronic and if he has infection, would be unlikely to start with po antibiotics.  Suggest:  - F/U blood cultures 4/14, NG 3d   - Gallium scan, may obtain FDG-PET scan if gal not available   - Continue off antibiotics  Recommendations discussed with primary team.  Will follow with you - team 1.

## 2023-04-17 NOTE — PHYSICAL THERAPY INITIAL EVALUATION ADULT - DIAGNOSIS, PT EVAL
Ongoing SW/CM Assessment/Plan of Care Note     See SW/CM flowsheets for goals and other objective data.    Patient/Family discharge goal (s):             PT Recommendation:  Recommendation for Discharge: PT: Sub-acute nursing home    OT Recommendation:  Recommendations for Discharge: OT: Sub-acute nursing home    SLP Recommendation:       Disposition:       Progress note:   Message received from patient's spouse inquiring about Advanced Directives, HC POA.  Writer stated she was able to assist patient with completing his HCPOA, and that a Financial POA required a Notary  writer could refer her to.  Writer also informed her that if patient was wanting to discuss code status it would be recommended Palliative Care talk with them.   Per Palliative Care NP note, she plans to meet with patient and spouse this Friday at 3pm.  Writer will continue to provide assistance appropriately.            6B: Impaired Aerobic Capacity/Endurance Associated with Deconditioning

## 2023-04-17 NOTE — PHYSICAL THERAPY INITIAL EVALUATION ADULT - ADDITIONAL COMMENTS
Pt states he lives in an assisted living facility, however, he's been IND. He typically uses a cane at all times, but will use a rollator when pain is more severe

## 2023-04-17 NOTE — PROGRESS NOTE ADULT - PROBLEM SELECTOR PLAN 2
History of chronic back pain for years , CT lumbars as above, concern for osteomyelitis. less likely due to no fever, no chills, no weight loss, never IVDU.  no leukocytosis. CRP 7.8. No BM/urinary incontinence  -No neurosurgical intervention warranted at this time  -f/u MRI L-spine (ordered) ;possible discitis? Seems unlikely given history of labs. ID consult presenting to ED with head injury and lower back pain after sustaining a fall earlier from drinking too much alcohol.  CTH unremarkable. CT lumbar spine: with findings of "sclerotic endplate changes at L4-L5 with marked erosive changes which could represent chronic sequelae from advanced degenerative disease or remote discitis, but can not exclude acute discitis osteomyelitis."  -Fall protocol   -Neurosurgery on board, no intervention at this time ,

## 2023-04-17 NOTE — PROGRESS NOTE ADULT - PROBLEM SELECTOR PLAN 3
Home dose synthroid 112mcg.   -TSH elevated; free T4 low   -c/w home med, f/u outpatient to consider increasing dose       ?CHITO, likely more CKD, unclear baseline  Uro f/u outpatient History of chronic back pain for years , CT lumbars as above, concern for osteomyelitis. less likely due to no fever, no chills, no weight loss, never IVDU.  no leukocytosis. CRP 7.8. No BM/urinary incontinence  -No neurosurgical intervention warranted at this time  -see osteomyelitis above

## 2023-04-17 NOTE — PROGRESS NOTE ADULT - SUBJECTIVE AND OBJECTIVE BOX
REJI BLAKE, 62y, Male  MRN-[id number]  Patient is a 62y old  Male who presents with a chief complaint of Back pain (16 Apr 2023 19:25)      OVERNIGHT EVENTS:     SUBJECTIVE:    12 Point ROS Negative unless noted otherwise above.  -------------------------------------------------------------------------------  VITAL SIGNS:  Vital Signs Last 24 Hrs  T(C): 36.6 (17 Apr 2023 05:11), Max: 36.9 (16 Apr 2023 12:03)  T(F): 97.8 (17 Apr 2023 05:11), Max: 98.4 (16 Apr 2023 12:03)  HR: 59 (17 Apr 2023 05:11) (59 - 70)  BP: 119/76 (17 Apr 2023 05:11) (105/63 - 130/73)  BP(mean): --  RR: 18 (17 Apr 2023 05:11) (16 - 18)  SpO2: 97% (17 Apr 2023 05:11) (96% - 97%)    Parameters below as of 17 Apr 2023 05:11  Patient On (Oxygen Delivery Method): room air      I&O's Summary    16 Apr 2023 07:01  -  17 Apr 2023 06:41  --------------------------------------------------------  IN: 0 mL / OUT: 1050 mL / NET: -1050 mL        PHYSICAL EXAM:    General: NAD, well developed  HEENT: NC/AT; EOMI, PERRLA, anicteric sclera; moist mucosal membranes.  Neck: supple, trachea midline  Cardiovascular: RRR, +S1/S2; NO M/R/G  Respiratory: CTA B/L; no W/R/R  Gastrointestinal: soft, NT/ND; +BSx4  Extremities: WWP; no edema or cyanosis  Vascular: 2+ radial, DP/PT pulses B/L  Neurological: AAOx3; no focal deficits    ALLERGIES:  Allergies    No Known Allergies    Intolerances        MEDICATIONS:  MEDICATIONS  (STANDING):  folic acid 1 milliGRAM(s) Oral daily  gabapentin 100 milliGRAM(s) Oral <User Schedule>  heparin   Injectable 5000 Unit(s) SubCutaneous every 8 hours  levothyroxine 112 MICROGram(s) Oral daily  lidocaine   4% Patch 1 Patch Transdermal every 24 hours  multivitamin 1 Tablet(s) Oral daily  thiamine 100 milliGRAM(s) Oral daily    MEDICATIONS  (PRN):  acetaminophen     Tablet .. 650 milliGRAM(s) Oral every 6 hours PRN Temp greater or equal to 38C (100.4F), Mild Pain (1 - 3)  ondansetron Injectable 4 milliGRAM(s) IV Push every 8 hours PRN Nausea and/or Vomiting  traMADol 25 milliGRAM(s) Oral every 8 hours PRN Moderate Pain (4 - 6)      -------------------------------------------------------------------------------  LABS:                        12.2   4.62  )-----------( 263      ( 16 Apr 2023 07:45 )             38.1     04-16    138  |  104  |  32<H>  ----------------------------<  96  4.4   |  25  |  1.74<H>    Ca    8.1<L>      16 Apr 2023 07:45  Phos  3.3     04-16  Mg     2.0     04-16            CAPILLARY BLOOD GLUCOSE          COVID-19 PCR: Negative (14 Apr 2023 01:32)      RADIOLOGY & ADDITIONAL TESTS: Reviewed.   REJI BLAKE, 62y, Male  MRN-[id number]  Patient is a 62y old  Male who presents with a chief complaint of Back pain (16 Apr 2023 19:25)    OVERNIGHT EVENTS:     SUBJECTIVE:    12 Point ROS Negative unless noted otherwise above.  -------------------------------------------------------------------------------  VITAL SIGNS:  Vital Signs Last 24 Hrs  T(C): 36.6 (17 Apr 2023 05:11), Max: 36.9 (16 Apr 2023 12:03)  T(F): 97.8 (17 Apr 2023 05:11), Max: 98.4 (16 Apr 2023 12:03)  HR: 59 (17 Apr 2023 05:11) (59 - 70)  BP: 119/76 (17 Apr 2023 05:11) (105/63 - 130/73)  RR: 18 (17 Apr 2023 05:11) (16 - 18)  SpO2: 97% (17 Apr 2023 05:11) (96% - 97%) KEN    I&O's Summary    16 Apr 2023 07:01  -  17 Apr 2023 06:41  --------------------------------------------------------  IN: 0 mL / OUT: 1050 mL / NET: -1050 mL    PHYSICAL EXAM:  GENERAL: Pt lying in bed comfortably in NAD  HEAD:  Atraumatic   EYES: EOMI, conjunctiva and sclera clear  ENT: Moist mucous membranes  NECK: Supple, No JVD  CHEST/LUNG: Clear to auscultation bilaterally; No rales, rhonchi, wheezing or rubs. Unlabored respirations  HEART: Regular rate and rhythm; No murmurs, rubs, or gallops  ABDOMEN: Bowel sounds present; Soft, Nontender, Nondistended. No guarding or rigidity    EXTREMITIES:  2+ Peripheral Pulses, brisk capillary refill. No clubbing, cyanosis, or edema  NERVOUS SYSTEM:  Alert & Oriented X3, speech clear. Answers questions appropriately. Facial movements symmetrical, no facial droop, tongue protrusion midline. Full and equal 5/5 strength B/L upper and lower extremities.   MSK: FROM x 4 extremities , Back spine tenderness in T4-T5 and lumbar, w/ radiation to R flank    ALLERGIES:  No Known Allergies    MEDICATIONS  (STANDING):  folic acid 1 milliGRAM(s) Oral daily  gabapentin 100 milliGRAM(s) Oral <User Schedule>  heparin   Injectable 5000 Unit(s) SubCutaneous every 8 hours  levothyroxine 112 MICROGram(s) Oral daily  lidocaine   4% Patch 1 Patch Transdermal every 24 hours  multivitamin 1 Tablet(s) Oral daily  thiamine 100 milliGRAM(s) Oral daily    MEDICATIONS  (PRN):  acetaminophen     Tablet .. 650 milliGRAM(s) Oral every 6 hours PRN Temp greater or equal to 38C (100.4F), Mild Pain (1 - 3)  ondansetron Injectable 4 milliGRAM(s) IV Push every 8 hours PRN Nausea and/or Vomiting  traMADol 25 milliGRAM(s) Oral every 8 hours PRN Moderate Pain (4 - 6)  -------------------------------------------------------------------------------  LABS:                        12.2   4.62  )-----------( 263      ( 16 Apr 2023 07:45 )             38.1     04-16    138  |  104  |  32<H>  ----------------------------<  96  4.4   |  25  |  1.74<H>    Ca    8.1<L>      16 Apr 2023 07:45  Phos  3.3     04-16  Mg     2.0     04-16    COVID-19 PCR: Negative (14 Apr 2023 01:32)    RADIOLOGY & ADDITIONAL TESTS: Reviewed.   LOUREJI PRITCHETT, 62y, Male  MRN-[id number]  Patient is a 62y old  Male who presents with a chief complaint of Back pain (16 Apr 2023 19:25)    OVERNIGHT EVENTS: ALIRIO    SUBJECTIVE: Patient reports no back pain this morning. Has had no difficulty with urination.    12 Point ROS Negative unless noted otherwise above.  -------------------------------------------------------------------------------  VITAL SIGNS:  Vital Signs Last 24 Hrs  T(C): 36.6 (17 Apr 2023 05:11), Max: 36.9 (16 Apr 2023 12:03)  T(F): 97.8 (17 Apr 2023 05:11), Max: 98.4 (16 Apr 2023 12:03)  HR: 59 (17 Apr 2023 05:11) (59 - 70)  BP: 119/76 (17 Apr 2023 05:11) (105/63 - 130/73)  RR: 18 (17 Apr 2023 05:11) (16 - 18)  SpO2: 97% (17 Apr 2023 05:11) (96% - 97%) RA    I&O's Summary    16 Apr 2023 07:01  -  17 Apr 2023 06:41  --------------------------------------------------------  IN: 0 mL / OUT: 1050 mL / NET: -1050 mL    PHYSICAL EXAM:  GENERAL: Pt lying in bed comfortably in NAD  HEAD:  Atraumatic   EYES: EOMI, conjunctiva and sclera clear  ENT: Moist mucous membranes  NECK: Supple, No JVD  CHEST/LUNG: Clear to auscultation bilaterally; No rales, rhonchi, wheezing or rubs. Unlabored respirations  HEART: Regular rate and rhythm; No murmurs, rubs, or gallops  ABDOMEN: Bowel sounds present; Soft, Nontender, Nondistended. No guarding or rigidity    EXTREMITIES:  2+ Peripheral Pulses, brisk capillary refill. No clubbing, cyanosis, or edema  NERVOUS SYSTEM:  Alert & Oriented X3, speech clear. Answers questions appropriately. Facial movements symmetrical, no facial droop, tongue protrusion midline. Full and equal 5/5 strength B/L upper and lower extremities.   MSK: FROM x 4 extremities , no back tenderness today    ALLERGIES:  No Known Allergies    MEDICATIONS  (STANDING):  folic acid 1 milliGRAM(s) Oral daily  gabapentin 100 milliGRAM(s) Oral <User Schedule>  heparin   Injectable 5000 Unit(s) SubCutaneous every 8 hours  levothyroxine 112 MICROGram(s) Oral daily  lidocaine   4% Patch 1 Patch Transdermal every 24 hours  multivitamin 1 Tablet(s) Oral daily  thiamine 100 milliGRAM(s) Oral daily    MEDICATIONS  (PRN):  acetaminophen     Tablet .. 650 milliGRAM(s) Oral every 6 hours PRN Temp greater or equal to 38C (100.4F), Mild Pain (1 - 3)  ondansetron Injectable 4 milliGRAM(s) IV Push every 8 hours PRN Nausea and/or Vomiting  traMADol 25 milliGRAM(s) Oral every 8 hours PRN Moderate Pain (4 - 6)  -------------------------------------------------------------------------------  LABS:                        12.2   4.62  )-----------( 263      ( 16 Apr 2023 07:45 )             38.1     04-16    138  |  104  |  32<H>  ----------------------------<  96  4.4   |  25  |  1.74<H>    Ca    8.1<L>      16 Apr 2023 07:45  Phos  3.3     04-16  Mg     2.0     04-16    COVID-19 PCR: Negative (14 Apr 2023 01:32)    RADIOLOGY & ADDITIONAL TESTS: Reviewed.   LOUREJI PRITCHETT, 62y, Male  MRN-[id number]  Patient is a 62y old  Male who presents with a chief complaint of Back pain (16 Apr 2023 19:25)    OVERNIGHT EVENTS: ALIRIO    SUBJECTIVE: Patient reports no back pain this morning. Has had no difficulty with urination.    12 Point ROS Negative unless noted otherwise above.  -------------------------------------------------------------------------------  VITAL SIGNS:  Vital Signs Last 24 Hrs  T(C): 36.6 (17 Apr 2023 05:11), Max: 36.9 (16 Apr 2023 12:03)  T(F): 97.8 (17 Apr 2023 05:11), Max: 98.4 (16 Apr 2023 12:03)  HR: 59 (17 Apr 2023 05:11) (59 - 70)  BP: 119/76 (17 Apr 2023 05:11) (105/63 - 130/73)  RR: 18 (17 Apr 2023 05:11) (16 - 18)  SpO2: 97% (17 Apr 2023 05:11) (96% - 97%) RA    I&O's Summary    16 Apr 2023 07:01  -  17 Apr 2023 06:41  --------------------------------------------------------  IN: 0 mL / OUT: 1050 mL / NET: -1050 mL    PHYSICAL EXAM:  GENERAL: Pt lying in bed comfortably in NAD  HEAD:  Atraumatic   EYES: EOMI, conjunctiva and sclera clear  ENT: Moist mucous membranes  NECK: Supple, No JVD  CHEST/LUNG: Clear to auscultation bilaterally; No rales, rhonchi, wheezing or rubs. Unlabored respirations  HEART: Regular rate and rhythm; No murmurs, rubs, or gallops  ABDOMEN: Bowel sounds present; Soft, Nontender, Nondistended. No guarding or rigidity    EXTREMITIES:  2+ Peripheral Pulses, brisk capillary refill. No clubbing, cyanosis, or edema  NERVOUS SYSTEM:  Alert & Oriented X3, speech clear. Answers questions appropriately. Facial movements symmetrical, no facial droop, tongue protrusion midline. Full and equal 5/5 strength B/L upper and lower extremities.   MSK: FROM x 4 extremities , no back tenderness today. Several fingers with DIP and PIP deformities.     ALLERGIES:  No Known Allergies    MEDICATIONS  (STANDING):  folic acid 1 milliGRAM(s) Oral daily  gabapentin 100 milliGRAM(s) Oral <User Schedule>  heparin   Injectable 5000 Unit(s) SubCutaneous every 8 hours  levothyroxine 112 MICROGram(s) Oral daily  lidocaine   4% Patch 1 Patch Transdermal every 24 hours  multivitamin 1 Tablet(s) Oral daily  thiamine 100 milliGRAM(s) Oral daily    MEDICATIONS  (PRN):  acetaminophen     Tablet .. 650 milliGRAM(s) Oral every 6 hours PRN Temp greater or equal to 38C (100.4F), Mild Pain (1 - 3)  ondansetron Injectable 4 milliGRAM(s) IV Push every 8 hours PRN Nausea and/or Vomiting  traMADol 25 milliGRAM(s) Oral every 8 hours PRN Moderate Pain (4 - 6)  -------------------------------------------------------------------------------  LABS:                        12.2   4.62  )-----------( 263      ( 16 Apr 2023 07:45 )             38.1     04-16    138  |  104  |  32<H>  ----------------------------<  96  4.4   |  25  |  1.74<H>    Ca    8.1<L>      16 Apr 2023 07:45  Phos  3.3     04-16  Mg     2.0     04-16    COVID-19 PCR: Negative (14 Apr 2023 01:32)    RADIOLOGY & ADDITIONAL TESTS: Reviewed.

## 2023-04-17 NOTE — PROGRESS NOTE ADULT - PROBLEM SELECTOR PLAN 5
F: None  E: replete as needed   N: regular  GI ppx: none  DVT ppx: Lovenox  CODE: FULL  Dispo: TARA Home dose synthroid 112mcg.   -TSH elevated; free T4 low   -c/w home med, f/u outpatient to consider increasing dose

## 2023-04-17 NOTE — PROGRESS NOTE ADULT - SUBJECTIVE AND OBJECTIVE BOX
INFECTIOUS DISEASE PROGRESS NOTE     INTERVAL/OVERNIGHT HX: No acute events overnight, pt afebrile. Continues to complain of back pain but reduced from before. Denies any new complaints.       ROS:  CONSTITUTIONAL:  Negative fever or chills, feels well, good appetite  EYES:  Negative  blurry vision or double vision  CARDIOVASCULAR:  Negative for chest pain or palpitations  RESPIRATORY:  Negative for cough, wheezing, or SOB   GASTROINTESTINAL:  Negative for nausea, vomiting, diarrhea, constipation, or abdominal pain  GENITOURINARY:  Negative frequency, urgency or dysuria  NEUROLOGIC:  No headache, confusion, dizziness, lightheadedness      Allergies    No Known Allergies    Intolerances        ANTIBIOTICS/RELEVANT:  antimicrobials    immunologic:    OTHER:  acetaminophen     Tablet .. 650 milliGRAM(s) Oral every 6 hours PRN  folic acid 1 milliGRAM(s) Oral daily  gabapentin 100 milliGRAM(s) Oral <User Schedule>  heparin   Injectable 5000 Unit(s) SubCutaneous every 8 hours  levothyroxine 112 MICROGram(s) Oral daily  lidocaine   4% Patch 1 Patch Transdermal every 24 hours  multivitamin 1 Tablet(s) Oral daily  ondansetron Injectable 4 milliGRAM(s) IV Push every 8 hours PRN  thiamine 100 milliGRAM(s) Oral daily  traMADol 25 milliGRAM(s) Oral every 8 hours PRN      Objective:  Vital Signs Last 24 Hrs  T(C): 36.6 (17 Apr 2023 05:11), Max: 36.9 (16 Apr 2023 12:03)  T(F): 97.8 (17 Apr 2023 05:11), Max: 98.4 (16 Apr 2023 12:03)  HR: 59 (17 Apr 2023 05:11) (59 - 70)  BP: 119/76 (17 Apr 2023 05:11) (105/63 - 130/73)  BP(mean): --  RR: 18 (17 Apr 2023 05:11) (16 - 18)  SpO2: 97% (17 Apr 2023 05:11) (96% - 97%)    Parameters below as of 17 Apr 2023 05:11  Patient On (Oxygen Delivery Method): room air        PHYSICAL EXAM:   Alert, in no distress  HEENT:  NC, PERRL, sclerae anicteric, conjunctivae clear.  Sinuses nontender, no nasal exudate.  No buccal or pharyngeal lesions, erythema or exudate  Neck:  Supple, no adenopathy  Lungs:  Clear to auscultation  Cor:  RRR, S1, S2, no murmur appreciated  Abd:  Symmetric, normoactive BS.  Soft, nontender, no masses, guarding or rebound.  Liver and spleen not enlarged  Back:  Tender to palpation lower lumbar area  Extrem:  No cyanosis or edema.  Nl LE strength  Skin:  No rashes.        LABS:                        12.0   4.47  )-----------( 247      ( 17 Apr 2023 05:30 )             36.7     04-17    136  |  104  |  37<H>  ----------------------------<  94  4.7   |  24  |  2.02<H>    Ca    7.9<L>      17 Apr 2023 05:30  Phos  3.0     04-17  Mg     1.9     04-17      MICROBIOLOGY:    Blood cultures:  4/14 X 2 - NGTD    RADIOLOGY & ADDITIONAL STUDIES:    < from: MR Lumbar Spine w/wo IV Cont (04.15.23 @ 17:55) >  FINDINGS: The MRI examination is limited by motion artifact. There is   hyperintense T2 signal within the L4/5 disc space with mild enhancement   along the margins. There is diffuse hypointense T1 and hyperintense T2   signal within the adjacent L4 and L5 endplates and vertebral bodies with   diffuse enhancement. The cortical irregularity along the endplates at   L4/5 is better seen prior lumbar spine CT. These findings are suggestive   of discitis/osteomyelitis. There is ventral epidural enhancing soft   tissue at the L4 and L5 levels indenting on the thecal sac (series 31   image 10 and series 34 images 35-36) without discrete fluid collection   may represent phlegmon. There is mild prevertebral enhancing soft tissue   with more pronounced paravertebral (left greater than right) enhancing   soft tissue (series 34 images 34-37). No discrete fluid collection is   identified within the soft tissue to suggest abscess. There is T2/STIR   signal abnormality within the dorsal paraspinal soft tissues suggestive   of edema.    There is Grade 2 anterolisthesis of L5 on S1 with severe loss of   intervertebral disc space height at L5/S. There also is T2 signal   abnormality with enhancement involving the residual L5/S1 disc space and   adjacent endplates. It is unclear whether this may represent additional   infection or alternatively degenerative changes given the anterolisthesis.    The remaining vertebral body heights and intervertebral disc spaces are   maintained. There is a left-sided subchondral cyst involving the L3   inferior endplate. The remaining vertebral body marrow signal appears   intact without enhancing lesions. The conus medullaris ends at L2/3 and   is low-lying.    IMPRESSION: Limited exam secondary to motion. Findings suggestive of   discitis/osteomyelitis at the L4/5 level with ventral enhancing epidural   soft tissue compressing the thecal sac as well as additional   predominantly paravertebral enhancing soft tissue (left greater than   right) without discrete fluid collection. Additional   discitis/osteomyelitis at L5/S1 versus degenerative changes.    --- End of Report ---    < end of copied text >

## 2023-04-17 NOTE — PROGRESS NOTE ADULT - PROBLEM SELECTOR PLAN 4
no s/s of withdrawal. Nutritional support. SW consult. Patient admitted with Cr ~2, unknown baseline. No known hx of CKD.  -CT  with evidence of severe L sided hydronephrosis with thinning of the renal parenchyma. Appearance suggests a chronic UPJ obstruction. Correlation with prior imaging is recommended.  -L sided hydro also demonstrated on renal u/s with evidence of medical renal disease  -bladder scans d/c-ed as patient is urinating well   -urology following; f/u recs  -outpatient urology follow up for probable chronic UPJ obstruction

## 2023-04-17 NOTE — PHYSICAL THERAPY INITIAL EVALUATION ADULT - PERTINENT HX OF CURRENT PROBLEM, REHAB EVAL
62 year-old with no sig PMHx presenting to ED with head injury and lower back pain after sustaining a fall earlier from drinking too much alcohol. .Pt reports pain is localized to the lower back without radiation to lower extremities. Denies cp, SOB, NVD, lightheaded, diaphoresis, palpitations, cough/rhinorrhea, No known prior cardiac w/u. CT lumbar spine with findings of "sclerotic endplate changes at L4-L5 with marked erosive changes which could represent chronic sequelae from advanced degenerative disease or remote discitis, but can not exclude acute discitis osteomyelitis." neurosurgery consulted in the ED and  recommended no intervention.

## 2023-04-18 LAB
ANION GAP SERPL CALC-SCNC: 9 MMOL/L — SIGNIFICANT CHANGE UP (ref 5–17)
BUN SERPL-MCNC: 35 MG/DL — HIGH (ref 7–23)
CALCIUM SERPL-MCNC: 8.2 MG/DL — LOW (ref 8.4–10.5)
CHLORIDE SERPL-SCNC: 105 MMOL/L — SIGNIFICANT CHANGE UP (ref 96–108)
CO2 SERPL-SCNC: 23 MMOL/L — SIGNIFICANT CHANGE UP (ref 22–31)
CREAT SERPL-MCNC: 2.03 MG/DL — HIGH (ref 0.5–1.3)
EGFR: 37 ML/MIN/1.73M2 — LOW
GLUCOSE SERPL-MCNC: 93 MG/DL — SIGNIFICANT CHANGE UP (ref 70–99)
HCT VFR BLD CALC: 37 % — LOW (ref 39–50)
HGB BLD-MCNC: 12.2 G/DL — LOW (ref 13–17)
MAGNESIUM SERPL-MCNC: 2 MG/DL — SIGNIFICANT CHANGE UP (ref 1.6–2.6)
MCHC RBC-ENTMCNC: 27.5 PG — SIGNIFICANT CHANGE UP (ref 27–34)
MCHC RBC-ENTMCNC: 33 GM/DL — SIGNIFICANT CHANGE UP (ref 32–36)
MCV RBC AUTO: 83.5 FL — SIGNIFICANT CHANGE UP (ref 80–100)
NRBC # BLD: 0 /100 WBCS — SIGNIFICANT CHANGE UP (ref 0–0)
PHOSPHATE SERPL-MCNC: 3.5 MG/DL — SIGNIFICANT CHANGE UP (ref 2.5–4.5)
PLATELET # BLD AUTO: 260 K/UL — SIGNIFICANT CHANGE UP (ref 150–400)
POTASSIUM SERPL-MCNC: 4.9 MMOL/L — SIGNIFICANT CHANGE UP (ref 3.5–5.3)
POTASSIUM SERPL-SCNC: 4.9 MMOL/L — SIGNIFICANT CHANGE UP (ref 3.5–5.3)
RBC # BLD: 4.43 M/UL — SIGNIFICANT CHANGE UP (ref 4.2–5.8)
RBC # FLD: 15.1 % — HIGH (ref 10.3–14.5)
SODIUM SERPL-SCNC: 137 MMOL/L — SIGNIFICANT CHANGE UP (ref 135–145)
WBC # BLD: 4.48 K/UL — SIGNIFICANT CHANGE UP (ref 3.8–10.5)
WBC # FLD AUTO: 4.48 K/UL — SIGNIFICANT CHANGE UP (ref 3.8–10.5)

## 2023-04-18 PROCEDURE — 99233 SBSQ HOSP IP/OBS HIGH 50: CPT | Mod: GC

## 2023-04-18 PROCEDURE — 99232 SBSQ HOSP IP/OBS MODERATE 35: CPT | Mod: GC

## 2023-04-18 RX ORDER — TRAMADOL HYDROCHLORIDE 50 MG/1
25 TABLET ORAL EVERY 8 HOURS
Refills: 0 | Status: DISCONTINUED | OUTPATIENT
Start: 2023-04-18 | End: 2023-04-21

## 2023-04-18 RX ADMIN — HEPARIN SODIUM 5000 UNIT(S): 5000 INJECTION INTRAVENOUS; SUBCUTANEOUS at 22:13

## 2023-04-18 RX ADMIN — TRAMADOL HYDROCHLORIDE 25 MILLIGRAM(S): 50 TABLET ORAL at 15:02

## 2023-04-18 RX ADMIN — TRAMADOL HYDROCHLORIDE 25 MILLIGRAM(S): 50 TABLET ORAL at 16:02

## 2023-04-18 RX ADMIN — Medication 100 MILLIGRAM(S): at 17:00

## 2023-04-18 RX ADMIN — HEPARIN SODIUM 5000 UNIT(S): 5000 INJECTION INTRAVENOUS; SUBCUTANEOUS at 13:36

## 2023-04-18 RX ADMIN — Medication 1 MILLIGRAM(S): at 17:00

## 2023-04-18 RX ADMIN — GABAPENTIN 100 MILLIGRAM(S): 400 CAPSULE ORAL at 17:00

## 2023-04-18 RX ADMIN — Medication 1 TABLET(S): at 17:00

## 2023-04-18 NOTE — PROGRESS NOTE ADULT - PROBLEM SELECTOR PLAN 5
Home dose synthroid 112mcg.   -TSH elevated; free T4 low   -c/w home med, f/u outpatient to consider increasing dose

## 2023-04-18 NOTE — PROVIDER CONTACT NOTE (MEDICATION) - SITUATION
Gave patient gabapentin at 13:36; patient left medication at bedside and did not take until 17:00. Asked provider if patient should take 20:00 scheduled dose.

## 2023-04-18 NOTE — PROGRESS NOTE ADULT - PROBLEM SELECTOR PLAN 4
Patient admitted with Cr ~2, unknown baseline. No known hx of CKD.  -CT  with evidence of severe L sided hydronephrosis with thinning of the renal parenchyma. Appearance suggests a chronic UPJ obstruction. Correlation with prior imaging is recommended.  -L sided hydro also demonstrated on renal u/s with evidence of medical renal disease  -bladder scans d/c-ed as patient is urinating well   -outpatient urology follow up for probable chronic UPJ obstruction  -outpatient f/u scheduled w/ Dr. Jiménez on 4/21

## 2023-04-18 NOTE — PROGRESS NOTE ADULT - PROBLEM SELECTOR PLAN 1
MR lumbar showing disciitis vs M at L4-L5 with ventral enhancing epidural soft tissue compressing theacal sac and L5/S1 disciitis/OM vs degenerative changes. Patient has hx of back injections for pain management, most recently about 1 month ago.   -ID consulted; f/u recs  -f/u PET scan (ordered gallium scan but no gallium available this week per NM)

## 2023-04-18 NOTE — PROGRESS NOTE ADULT - SUBJECTIVE AND OBJECTIVE BOX
REJI BLAKE, 62y, Male  MRN-[id number]  Patient is a 62y old  Male who presents with a chief complaint of Back pain (17 Apr 2023 11:43)    OVERNIGHT EVENTS:     SUBJECTIVE:    12 Point ROS Negative unless noted otherwise above.  -------------------------------------------------------------------------------  VITAL SIGNS:  Vital Signs Last 24 Hrs  T(C): 37 (18 Apr 2023 05:46), Max: 37.6 (17 Apr 2023 21:49)  T(F): 98.6 (18 Apr 2023 05:46), Max: 99.7 (17 Apr 2023 21:49)  HR: 67 (18 Apr 2023 05:46) (56 - 73)  BP: 117/77 (18 Apr 2023 05:46) (108/72 - 117/77)  RR: 18 (18 Apr 2023 05:46) (17 - 18)  SpO2: 99% (18 Apr 2023 05:46) (97% - 99%) RA    PHYSICAL EXAM:  GENERAL: Pt lying in bed comfortably in NAD  HEAD:  Atraumatic   EYES: EOMI, conjunctiva and sclera clear  ENT: Moist mucous membranes  NECK: Supple, No JVD  CHEST/LUNG: Clear to auscultation bilaterally; No rales, rhonchi, wheezing or rubs. Unlabored respirations  HEART: Regular rate and rhythm; No murmurs, rubs, or gallops  ABDOMEN: Bowel sounds present; Soft, Nontender, Nondistended. No guarding or rigidity    EXTREMITIES:  2+ Peripheral Pulses, brisk capillary refill. No clubbing, cyanosis, or edema  NERVOUS SYSTEM:  Alert & Oriented X3, speech clear. Answers questions appropriately. Facial movements symmetrical, no facial droop, tongue protrusion midline. Full and equal 5/5 strength B/L upper and lower extremities.   MSK: FROM x 4 extremities , no back tenderness today. Several fingers with DIP and PIP deformities.    ALLERGIES:  No Known Allergies    MEDICATIONS  (STANDING):  folic acid 1 milliGRAM(s) Oral daily  gabapentin 100 milliGRAM(s) Oral <User Schedule>  heparin   Injectable 5000 Unit(s) SubCutaneous every 8 hours  levothyroxine 112 MICROGram(s) Oral daily  lidocaine   4% Patch 1 Patch Transdermal every 24 hours  multivitamin 1 Tablet(s) Oral daily  thiamine 100 milliGRAM(s) Oral daily    MEDICATIONS  (PRN):  acetaminophen     Tablet .. 650 milliGRAM(s) Oral every 6 hours PRN Temp greater or equal to 38C (100.4F), Mild Pain (1 - 3)  ondansetron Injectable 4 milliGRAM(s) IV Push every 8 hours PRN Nausea and/or Vomiting  traMADol 25 milliGRAM(s) Oral every 8 hours PRN Moderate Pain (4 - 6)  -------------------------------------------------------------------------------  LABS:                        12.0   4.47  )-----------( 247      ( 17 Apr 2023 05:30 )             36.7     04-17    136  |  104  |  37<H>  ----------------------------<  94  4.7   |  24  |  2.02<H>    Ca    7.9<L>      17 Apr 2023 05:30  Phos  3.0     04-17  Mg     1.9     04-17    COVID-19 PCR: Negative (14 Apr 2023 01:32)    RADIOLOGY & ADDITIONAL TESTS: Reviewed.   REJI BLAKE, 62y, Male  MRN-[id number]  Patient is a 62y old  Male who presents with a chief complaint of Back pain (17 Apr 2023 11:43)    OVERNIGHT EVENTS: ALIRIO    SUBJECTIVE: Patient reports persistence of his chronic back pain as well as R knee pain but no new complaints.     12 Point ROS Negative unless noted otherwise above.  -------------------------------------------------------------------------------  VITAL SIGNS:  Vital Signs Last 24 Hrs  T(C): 37 (18 Apr 2023 05:46), Max: 37.6 (17 Apr 2023 21:49)  T(F): 98.6 (18 Apr 2023 05:46), Max: 99.7 (17 Apr 2023 21:49)  HR: 67 (18 Apr 2023 05:46) (56 - 73)  BP: 117/77 (18 Apr 2023 05:46) (108/72 - 117/77)  RR: 18 (18 Apr 2023 05:46) (17 - 18)  SpO2: 99% (18 Apr 2023 05:46) (97% - 99%) RA    PHYSICAL EXAM:  GENERAL: Pt lying in bed comfortably in NAD  HEAD:  Atraumatic   EYES: EOMI, conjunctiva and sclera clear  ENT: Moist mucous membranes  NECK: Supple, No JVD  CHEST/LUNG: Clear to auscultation bilaterally; No rales, rhonchi, wheezing or rubs. Unlabored respirations  HEART: Regular rate and rhythm; No murmurs, rubs, or gallops  ABDOMEN: Bowel sounds present; Soft, Nontender, Nondistended. No guarding or rigidity    EXTREMITIES:  2+ Peripheral Pulses, brisk capillary refill. No clubbing, cyanosis, or edema  NERVOUS SYSTEM:  Alert & Oriented X3, speech clear. Answers questions appropriately. Facial movements symmetrical, no facial droop. Full and equal 5/5 strength B/L upper and lower extremities.   MSK: FROM x 4 extremities, mild lumbar back pain. Several fingers with DIP and PIP deformities.    ALLERGIES:  No Known Allergies    MEDICATIONS  (STANDING):  folic acid 1 milliGRAM(s) Oral daily  gabapentin 100 milliGRAM(s) Oral <User Schedule>  heparin   Injectable 5000 Unit(s) SubCutaneous every 8 hours  levothyroxine 112 MICROGram(s) Oral daily  lidocaine   4% Patch 1 Patch Transdermal every 24 hours  multivitamin 1 Tablet(s) Oral daily  thiamine 100 milliGRAM(s) Oral daily    MEDICATIONS  (PRN):  acetaminophen     Tablet .. 650 milliGRAM(s) Oral every 6 hours PRN Temp greater or equal to 38C (100.4F), Mild Pain (1 - 3)  ondansetron Injectable 4 milliGRAM(s) IV Push every 8 hours PRN Nausea and/or Vomiting  traMADol 25 milliGRAM(s) Oral every 8 hours PRN Moderate Pain (4 - 6)  -------------------------------------------------------------------------------  LABS:                        12.0   4.47  )-----------( 247      ( 17 Apr 2023 05:30 )             36.7     04-17    136  |  104  |  37<H>  ----------------------------<  94  4.7   |  24  |  2.02<H>    Ca    7.9<L>      17 Apr 2023 05:30  Phos  3.0     04-17  Mg     1.9     04-17    COVID-19 PCR: Negative (14 Apr 2023 01:32)    RADIOLOGY & ADDITIONAL TESTS: Reviewed.

## 2023-04-18 NOTE — PROGRESS NOTE ADULT - SUBJECTIVE AND OBJECTIVE BOX
INFECTIOUS DISEASE PROGRESS NOTE     INTERVAL/OVERNIGHT HX: No acute events overnight, pt afebrile. Continues to complain of back pain but reduced from before. Denies any new complaints.         ROS:  CONSTITUTIONAL:  Negative fever or chills, feels well, good appetite  EYES:  Negative  blurry vision or double vision  CARDIOVASCULAR:  Negative for chest pain or palpitations  RESPIRATORY:  Negative for cough, wheezing, or SOB   GASTROINTESTINAL:  Negative for nausea, vomiting, diarrhea, constipation, or abdominal pain  GENITOURINARY:  Negative frequency, urgency or dysuria  NEUROLOGIC:  No headache, confusion, dizziness, lightheadedness    Allergies    No Known Allergies    Intolerances        ANTIBIOTICS/RELEVANT:  antimicrobials    immunologic:    OTHER:  acetaminophen     Tablet .. 650 milliGRAM(s) Oral every 6 hours PRN  folic acid 1 milliGRAM(s) Oral daily  gabapentin 100 milliGRAM(s) Oral <User Schedule>  heparin   Injectable 5000 Unit(s) SubCutaneous every 8 hours  levothyroxine 112 MICROGram(s) Oral daily  lidocaine   4% Patch 1 Patch Transdermal every 24 hours  multivitamin 1 Tablet(s) Oral daily  ondansetron Injectable 4 milliGRAM(s) IV Push every 8 hours PRN  thiamine 100 milliGRAM(s) Oral daily  traMADol 25 milliGRAM(s) Oral every 8 hours PRN      Objective:  Vital Signs Last 24 Hrs  T(C): 37 (18 Apr 2023 05:46), Max: 37.6 (17 Apr 2023 21:49)  T(F): 98.6 (18 Apr 2023 05:46), Max: 99.7 (17 Apr 2023 21:49)  HR: 67 (18 Apr 2023 05:46) (67 - 73)  BP: 117/77 (18 Apr 2023 05:46) (108/72 - 117/77)  BP(mean): --  RR: 18 (18 Apr 2023 05:46) (17 - 18)  SpO2: 99% (18 Apr 2023 05:46) (98% - 99%)    Parameters below as of 17 Apr 2023 21:49  Patient On (Oxygen Delivery Method): room air          PHYSICAL EXAM:   Alert, in no distress  HEENT:  NC, PERRL, sclerae anicteric, conjunctivae clear.  Sinuses nontender, no nasal exudate.  No buccal or pharyngeal lesions, erythema or exudate  Neck:  Supple, no adenopathy  Lungs:  Clear to auscultation  Cor:  RRR, S1, S2, no murmur appreciated  Abd:  Symmetric, normoactive BS.  Soft, nontender, no masses, guarding or rebound.  Liver and spleen not enlarged  Back:  Tender to palpation lower lumbar area  Extrem:  No cyanosis or edema.  Nl LE strength  Skin:  No rashes.        LABS:                        12.2   4.48  )-----------( 260      ( 18 Apr 2023 05:30 )             37.0     04-18    137  |  105  |  35<H>  ----------------------------<  93  4.9   |  23  |  2.03<H>    Ca    8.2<L>      18 Apr 2023 05:30  Phos  3.5     04-18  Mg     2.0     04-18        MICROBIOLOGY:    Blood cultures:  4/14 X 2 - NGTD    RADIOLOGY & ADDITIONAL STUDIES:    < from: MR Lumbar Spine w/wo IV Cont (04.15.23 @ 17:55) >  FINDINGS: The MRI examination is limited by motion artifact. There is   hyperintense T2 signal within the L4/5 disc space with mild enhancement   along the margins. There is diffuse hypointense T1 and hyperintense T2   signal within the adjacent L4 and L5 endplates and vertebral bodies with   diffuse enhancement. The cortical irregularity along the endplates at   L4/5 is better seen prior lumbar spine CT. These findings are suggestive   of discitis/osteomyelitis. There is ventral epidural enhancing soft   tissue at the L4 and L5 levels indenting on the thecal sac (series 31   image 10 and series 34 images 35-36) without discrete fluid collection   may represent phlegmon. There is mild prevertebral enhancing soft tissue   with more pronounced paravertebral (left greater than right) enhancing   soft tissue (series 34 images 34-37). No discrete fluid collection is   identified within the soft tissue to suggest abscess. There is T2/STIR   signal abnormality within the dorsal paraspinal soft tissues suggestive   of edema.    There is Grade 2 anterolisthesis of L5 on S1 with severe loss of   intervertebral disc space height at L5/S. There also is T2 signal   abnormality with enhancement involving the residual L5/S1 disc space and   adjacent endplates. It is unclear whether this may represent additional   infection or alternatively degenerative changes given the anterolisthesis.    The remaining vertebral body heights and intervertebral disc spaces are   maintained. There is a left-sided subchondral cyst involving the L3   inferior endplate. The remaining vertebral body marrow signal appears   intact without enhancing lesions. The conus medullaris ends at L2/3 and   is low-lying.    IMPRESSION: Limited exam secondary to motion. Findings suggestive of   discitis/osteomyelitis at the L4/5 level with ventral enhancing epidural   soft tissue compressing the thecal sac as well as additional   predominantly paravertebral enhancing soft tissue (left greater than   right) without discrete fluid collection. Additional   discitis/osteomyelitis at L5/S1 versus degenerative changes.    --- End of Report ---    < end of copied text >

## 2023-04-18 NOTE — PROGRESS NOTE ADULT - ASSESSMENT
61 yo M with chronic LBP admitted following fall.  MRI concerning for OM/discitis L4/L5 with ventral enhancing ST compressing thecal sac and enhancing paravertebral ST without discrete collection.  He is afebrile, WBC, ESR and CRP are WNL.  However, he has received numerous injections for chronic pain.  Is at risk for organisms that may not be associated with signs of acute inflammation, including fungi and relatively low virulence bacteria (eg Cutibacterium acnes).  Would not want to start po antibiotics without knowing if he has infection and the causative organism.  Would not know if the process was chronic and if he has infection, would be unlikely to start with po antibiotics.  Suggest:  - F/U blood cultures 4/14, NGTD  - f/u Gallium scan, may obtain FDG-PET scan if gal not available   - Continue off antibiotics  Recommendations discussed with primary team.  Will follow with you - team 1.

## 2023-04-18 NOTE — PROGRESS NOTE ADULT - PROBLEM SELECTOR PLAN 3
History of chronic back pain for years , CT lumbars as above, concern for osteomyelitis. less likely due to no fever, no chills, no weight loss, never IVDU.  no leukocytosis. CRP 7.8. No BM/urinary incontinence  -No neurosurgical intervention warranted at this time  -see osteomyelitis above

## 2023-04-18 NOTE — PROGRESS NOTE ADULT - PROBLEM SELECTOR PLAN 2
presenting to ED with head injury and lower back pain after sustaining a fall earlier from drinking too much alcohol.  CTH unremarkable. CT lumbar spine: with findings of "sclerotic endplate changes at L4-L5 with marked erosive changes which could represent chronic sequelae from advanced degenerative disease or remote discitis, but can not exclude acute discitis osteomyelitis."  -Fall protocol   -Neurosurgery on board, no intervention at this time , presenting to ED with head injury and lower back pain after sustaining a fall earlier from drinking too much alcohol.  CTH unremarkable. CT lumbar spine: with findings of "sclerotic endplate changes at L4-L5 with marked erosive changes which could represent chronic sequelae from advanced degenerative disease or remote discitis, but can not exclude acute discitis osteomyelitis."  -Fall protocol   -Neurosurgery on board, no intervention at this time

## 2023-04-19 LAB
% ALBUMIN: 54.7 % — SIGNIFICANT CHANGE UP
% ALPHA 1: 3.9 % — SIGNIFICANT CHANGE UP
% ALPHA 2: 10.9 % — SIGNIFICANT CHANGE UP
% BETA: 10.4 % — SIGNIFICANT CHANGE UP
% GAMMA: 20.1 % — SIGNIFICANT CHANGE UP
ALBUMIN SERPL ELPH-MCNC: 3.3 G/DL — LOW (ref 3.6–5.5)
ALBUMIN/GLOB SERPL ELPH: 1.2 RATIO — SIGNIFICANT CHANGE UP
ALPHA1 GLOB SERPL ELPH-MCNC: 0.2 G/DL — SIGNIFICANT CHANGE UP (ref 0.1–0.4)
ALPHA2 GLOB SERPL ELPH-MCNC: 0.7 G/DL — SIGNIFICANT CHANGE UP (ref 0.5–1)
ANION GAP SERPL CALC-SCNC: 8 MMOL/L — SIGNIFICANT CHANGE UP (ref 5–17)
B-GLOBULIN SERPL ELPH-MCNC: 0.6 G/DL — SIGNIFICANT CHANGE UP (ref 0.5–1)
BUN SERPL-MCNC: 32 MG/DL — HIGH (ref 7–23)
CALCIUM SERPL-MCNC: 8.5 MG/DL — SIGNIFICANT CHANGE UP (ref 8.4–10.5)
CHLORIDE SERPL-SCNC: 104 MMOL/L — SIGNIFICANT CHANGE UP (ref 96–108)
CO2 SERPL-SCNC: 25 MMOL/L — SIGNIFICANT CHANGE UP (ref 22–31)
CREAT SERPL-MCNC: 1.95 MG/DL — HIGH (ref 0.5–1.3)
CULTURE RESULTS: SIGNIFICANT CHANGE UP
CULTURE RESULTS: SIGNIFICANT CHANGE UP
EGFR: 38 ML/MIN/1.73M2 — LOW
GAMMA GLOBULIN: 1.2 G/DL — SIGNIFICANT CHANGE UP (ref 0.6–1.6)
GLUCOSE SERPL-MCNC: 96 MG/DL — SIGNIFICANT CHANGE UP (ref 70–99)
HCT VFR BLD CALC: 37.5 % — LOW (ref 39–50)
HGB BLD-MCNC: 12 G/DL — LOW (ref 13–17)
MAGNESIUM SERPL-MCNC: 2 MG/DL — SIGNIFICANT CHANGE UP (ref 1.6–2.6)
MCHC RBC-ENTMCNC: 27 PG — SIGNIFICANT CHANGE UP (ref 27–34)
MCHC RBC-ENTMCNC: 32 GM/DL — SIGNIFICANT CHANGE UP (ref 32–36)
MCV RBC AUTO: 84.3 FL — SIGNIFICANT CHANGE UP (ref 80–100)
NRBC # BLD: 0 /100 WBCS — SIGNIFICANT CHANGE UP (ref 0–0)
PHOSPHATE SERPL-MCNC: 4 MG/DL — SIGNIFICANT CHANGE UP (ref 2.5–4.5)
PLATELET # BLD AUTO: 279 K/UL — SIGNIFICANT CHANGE UP (ref 150–400)
POTASSIUM SERPL-MCNC: 4.8 MMOL/L — SIGNIFICANT CHANGE UP (ref 3.5–5.3)
POTASSIUM SERPL-SCNC: 4.8 MMOL/L — SIGNIFICANT CHANGE UP (ref 3.5–5.3)
PROT PATTERN SERPL ELPH-IMP: SIGNIFICANT CHANGE UP
PROT SERPL-MCNC: 6.1 G/DL — SIGNIFICANT CHANGE UP (ref 6–8.3)
PROT SERPL-MCNC: 6.1 G/DL — SIGNIFICANT CHANGE UP (ref 6–8.3)
RBC # BLD: 4.45 M/UL — SIGNIFICANT CHANGE UP (ref 4.2–5.8)
RBC # FLD: 15.2 % — HIGH (ref 10.3–14.5)
SARS-COV-2 RNA SPEC QL NAA+PROBE: SIGNIFICANT CHANGE UP
SODIUM SERPL-SCNC: 137 MMOL/L — SIGNIFICANT CHANGE UP (ref 135–145)
SPECIMEN SOURCE: SIGNIFICANT CHANGE UP
SPECIMEN SOURCE: SIGNIFICANT CHANGE UP
WBC # BLD: 5.59 K/UL — SIGNIFICANT CHANGE UP (ref 3.8–10.5)
WBC # FLD AUTO: 5.59 K/UL — SIGNIFICANT CHANGE UP (ref 3.8–10.5)

## 2023-04-19 PROCEDURE — 99233 SBSQ HOSP IP/OBS HIGH 50: CPT | Mod: GC

## 2023-04-19 PROCEDURE — 99232 SBSQ HOSP IP/OBS MODERATE 35: CPT | Mod: GC

## 2023-04-19 PROCEDURE — 78816 PET IMAGE W/CT FULL BODY: CPT | Mod: 26

## 2023-04-19 RX ADMIN — HEPARIN SODIUM 5000 UNIT(S): 5000 INJECTION INTRAVENOUS; SUBCUTANEOUS at 13:22

## 2023-04-19 RX ADMIN — Medication 100 MILLIGRAM(S): at 12:32

## 2023-04-19 RX ADMIN — Medication 112 MICROGRAM(S): at 06:10

## 2023-04-19 RX ADMIN — Medication 1 TABLET(S): at 12:32

## 2023-04-19 RX ADMIN — GABAPENTIN 100 MILLIGRAM(S): 400 CAPSULE ORAL at 19:14

## 2023-04-19 RX ADMIN — Medication 1 MILLIGRAM(S): at 12:33

## 2023-04-19 RX ADMIN — GABAPENTIN 100 MILLIGRAM(S): 400 CAPSULE ORAL at 12:32

## 2023-04-19 RX ADMIN — GABAPENTIN 100 MILLIGRAM(S): 400 CAPSULE ORAL at 06:10

## 2023-04-19 NOTE — PROGRESS NOTE ADULT - SUBJECTIVE AND OBJECTIVE BOX
OVERNIGHT EVENTS:  vicki    SUBJECTIVE / INTERVAL HPI: Patient seen and examined at bedside.  In no distress endorsing no discomfort.    VITAL SIGNS:  Vital Signs Last 24 Hrs  T(C): 36.6 (19 Apr 2023 06:02), Max: 36.8 (18 Apr 2023 13:35)  T(F): 97.8 (19 Apr 2023 06:02), Max: 98.2 (18 Apr 2023 13:35)  HR: 61 (19 Apr 2023 06:02) (61 - 69)  BP: 113/70 (19 Apr 2023 06:02) (113/70 - 124/79)  BP(mean): 94 (18 Apr 2023 20:22) (94 - 94)  RR: 17 (19 Apr 2023 06:02) (17 - 18)  SpO2: 96% (19 Apr 2023 06:02) (96% - 97%)    Parameters below as of 19 Apr 2023 06:02  Patient On (Oxygen Delivery Method): room air      I&O's Summary      PHYSICAL EXAM:  GENERAL: Pt lying in bed comfortably in NAD  HEAD:  Atraumatic   EYES: EOMI, conjunctiva and sclera clear  ENT: Moist mucous membranes  NECK: Supple, No JVD  CHEST/LUNG: Clear to auscultation bilaterally; No rales, rhonchi, wheezing or rubs. Unlabored respirations  HEART: Regular rate and rhythm; No murmurs, rubs, or gallops  ABDOMEN: Bowel sounds present; Soft, Nontender, Nondistended. No guarding or rigidity    EXTREMITIES:  2+ Peripheral Pulses, brisk capillary refill. No clubbing, cyanosis, or edema  NERVOUS SYSTEM:  Alert & Oriented X3, speech clear. Answers questions appropriately. Facial movements symmetrical, no facial droop. Full and equal 5/5 strength B/L upper and lower extremities.   MSK: FROM x 4 extremities, mild lumbar back pain. Several fingers with DIP and PIP deformities.      MEDICATIONS:  MEDICATIONS  (STANDING):  folic acid 1 milliGRAM(s) Oral daily  gabapentin 100 milliGRAM(s) Oral <User Schedule>  heparin   Injectable 5000 Unit(s) SubCutaneous every 8 hours  levothyroxine 112 MICROGram(s) Oral daily  lidocaine   4% Patch 1 Patch Transdermal every 24 hours  multivitamin 1 Tablet(s) Oral daily  thiamine 100 milliGRAM(s) Oral daily    MEDICATIONS  (PRN):  acetaminophen     Tablet .. 650 milliGRAM(s) Oral every 6 hours PRN Temp greater or equal to 38C (100.4F), Mild Pain (1 - 3)  ondansetron Injectable 4 milliGRAM(s) IV Push every 8 hours PRN Nausea and/or Vomiting  traMADol 25 milliGRAM(s) Oral every 8 hours PRN Severe Pain (7 - 10)      ALLERGIES:  Allergies    No Known Allergies    Intolerances        LABS:                        12.0   5.59  )-----------( 279      ( 19 Apr 2023 05:30 )             37.5     04-19    137  |  104  |  32<H>  ----------------------------<  96  4.8   |  25  |  1.95<H>    Ca    8.5      19 Apr 2023 05:30  Phos  4.0     04-19  Mg     2.0     04-19          CAPILLARY BLOOD GLUCOSE          RADIOLOGY & ADDITIONAL TESTS: Reviewed.

## 2023-04-19 NOTE — PROGRESS NOTE ADULT - PROBLEM SELECTOR PLAN 2
presenting to ED with head injury and lower back pain after sustaining a fall earlier from drinking too much alcohol.  CTH unremarkable. CT lumbar spine: with findings of "sclerotic endplate changes at L4-L5 with marked erosive changes which could represent chronic sequelae from advanced degenerative disease or remote discitis, but can not exclude acute discitis osteomyelitis."  -Fall protocol   -Neurosurgery on board, no intervention at this time

## 2023-04-19 NOTE — PROGRESS NOTE ADULT - SUBJECTIVE AND OBJECTIVE BOX
INFECTIOUS DISEASE PROGRESS NOTE     INTERVAL/OVERNIGHT HX: No acute events overnight, pt afebrile. Continues to complain of back pain but reduced from before. Denies any new complaints.     ROS:  CONSTITUTIONAL:  Negative fever or chills, feels well, good appetite  EYES:  Negative  blurry vision or double vision  CARDIOVASCULAR:  Negative for chest pain or palpitations  RESPIRATORY:  Negative for cough, wheezing, or SOB   GASTROINTESTINAL:  Negative for nausea, vomiting, diarrhea, constipation, or abdominal pain  GENITOURINARY:  Negative frequency, urgency or dysuria  NEUROLOGIC:  No headache, confusion, dizziness, lightheadedness  Allergies    No Known Allergies    Intolerances        ANTIBIOTICS/RELEVANT:  antimicrobials    immunologic:    OTHER:  acetaminophen     Tablet .. 650 milliGRAM(s) Oral every 6 hours PRN  folic acid 1 milliGRAM(s) Oral daily  gabapentin 100 milliGRAM(s) Oral <User Schedule>  heparin   Injectable 5000 Unit(s) SubCutaneous every 8 hours  levothyroxine 112 MICROGram(s) Oral daily  lidocaine   4% Patch 1 Patch Transdermal every 24 hours  multivitamin 1 Tablet(s) Oral daily  ondansetron Injectable 4 milliGRAM(s) IV Push every 8 hours PRN  thiamine 100 milliGRAM(s) Oral daily  traMADol 25 milliGRAM(s) Oral every 8 hours PRN      Objective:  Vital Signs Last 24 Hrs  T(C): 36.6 (19 Apr 2023 06:02), Max: 36.8 (18 Apr 2023 13:35)  T(F): 97.8 (19 Apr 2023 06:02), Max: 98.2 (18 Apr 2023 13:35)  HR: 61 (19 Apr 2023 06:02) (61 - 69)  BP: 113/70 (19 Apr 2023 06:02) (113/70 - 124/79)  BP(mean): 94 (18 Apr 2023 20:22) (94 - 94)  RR: 17 (19 Apr 2023 06:02) (17 - 18)  SpO2: 96% (19 Apr 2023 06:02) (96% - 97%)    Parameters below as of 19 Apr 2023 06:02  Patient On (Oxygen Delivery Method): room air        PHYSICAL EXAM:   Alert, in no distress  HEENT:  NC, PERRL, sclerae anicteric, conjunctivae clear.  Sinuses nontender, no nasal exudate.  No buccal or pharyngeal lesions, erythema or exudate  Neck:  Supple, no adenopathy  Lungs:  Clear to auscultation  Cor:  RRR, S1, S2, no murmur appreciated  Abd:  Symmetric, normoactive BS.  Soft, nontender, no masses, guarding or rebound.  Liver and spleen not enlarged  Back:  Tender to palpation lower lumbar area  Extrem:  No cyanosis or edema.  Nl LE strength  Skin:  No rashes.        LABS:                        12.0   5.59  )-----------( 279      ( 19 Apr 2023 05:30 )             37.5     04-19    137  |  104  |  32<H>  ----------------------------<  96  4.8   |  25  |  1.95<H>    Ca    8.5      19 Apr 2023 05:30  Phos  4.0     04-19  Mg     2.0     04-19              MICROBIOLOGY:    Blood cultures:  4/14 X 2 - NGTD    RADIOLOGY & ADDITIONAL STUDIES:    < from: MR Lumbar Spine w/wo IV Cont (04.15.23 @ 17:55) >  FINDINGS: The MRI examination is limited by motion artifact. There is   hyperintense T2 signal within the L4/5 disc space with mild enhancement   along the margins. There is diffuse hypointense T1 and hyperintense T2   signal within the adjacent L4 and L5 endplates and vertebral bodies with   diffuse enhancement. The cortical irregularity along the endplates at   L4/5 is better seen prior lumbar spine CT. These findings are suggestive   of discitis/osteomyelitis. There is ventral epidural enhancing soft   tissue at the L4 and L5 levels indenting on the thecal sac (series 31   image 10 and series 34 images 35-36) without discrete fluid collection   may represent phlegmon. There is mild prevertebral enhancing soft tissue   with more pronounced paravertebral (left greater than right) enhancing   soft tissue (series 34 images 34-37). No discrete fluid collection is   identified within the soft tissue to suggest abscess. There is T2/STIR   signal abnormality within the dorsal paraspinal soft tissues suggestive   of edema.    There is Grade 2 anterolisthesis of L5 on S1 with severe loss of   intervertebral disc space height at L5/S. There also is T2 signal   abnormality with enhancement involving the residual L5/S1 disc space and   adjacent endplates. It is unclear whether this may represent additional   infection or alternatively degenerative changes given the anterolisthesis.    The remaining vertebral body heights and intervertebral disc spaces are   maintained. There is a left-sided subchondral cyst involving the L3   inferior endplate. The remaining vertebral body marrow signal appears   intact without enhancing lesions. The conus medullaris ends at L2/3 and   is low-lying.    IMPRESSION: Limited exam secondary to motion. Findings suggestive of   discitis/osteomyelitis at the L4/5 level with ventral enhancing epidural   soft tissue compressing the thecal sac as well as additional   predominantly paravertebral enhancing soft tissue (left greater than   right) without discrete fluid collection. Additional   discitis/osteomyelitis at L5/S1 versus degenerative changes.    --- End of Report ---    < end of copied text > INFECTIOUS DISEASE PROGRESS NOTE     INTERVAL/OVERNIGHT HX: No acute events overnight, pt afebrile. Continues to complain of back pain but reduced from before. Denies any new complaints.     ROS:  CONSTITUTIONAL:  Negative fever or chills, feels well, good appetite  EYES:  Negative  blurry vision or double vision  CARDIOVASCULAR:  Negative for chest pain or palpitations  RESPIRATORY:  Negative for cough, wheezing, or SOB   GASTROINTESTINAL:  Negative for nausea, vomiting, diarrhea, constipation, or abdominal pain  GENITOURINARY:  Negative frequency, urgency or dysuria  NEUROLOGIC:  No headache, confusion, dizziness, lightheadedness  Allergies    No Known Allergies    Intolerances        ANTIBIOTICS/RELEVANT:  antimicrobials    immunologic:    OTHER:  acetaminophen     Tablet .. 650 milliGRAM(s) Oral every 6 hours PRN  folic acid 1 milliGRAM(s) Oral daily  gabapentin 100 milliGRAM(s) Oral <User Schedule>  heparin   Injectable 5000 Unit(s) SubCutaneous every 8 hours  levothyroxine 112 MICROGram(s) Oral daily  lidocaine   4% Patch 1 Patch Transdermal every 24 hours  multivitamin 1 Tablet(s) Oral daily  ondansetron Injectable 4 milliGRAM(s) IV Push every 8 hours PRN  thiamine 100 milliGRAM(s) Oral daily  traMADol 25 milliGRAM(s) Oral every 8 hours PRN      Objective:  Vital Signs Last 24 Hrs  T(C): 36.6 (19 Apr 2023 06:02), Max: 36.8 (18 Apr 2023 13:35)  T(F): 97.8 (19 Apr 2023 06:02), Max: 98.2 (18 Apr 2023 13:35)  HR: 61 (19 Apr 2023 06:02) (61 - 69)  BP: 113/70 (19 Apr 2023 06:02) (113/70 - 124/79)  BP(mean): 94 (18 Apr 2023 20:22) (94 - 94)  RR: 17 (19 Apr 2023 06:02) (17 - 18)  SpO2: 96% (19 Apr 2023 06:02) (96% - 97%)    Parameters below as of 19 Apr 2023 06:02  Patient On (Oxygen Delivery Method): room air        PHYSICAL EXAM:   Alert, in no distress  HEENT:  NC, PERRL, sclerae anicteric, conjunctivae clear.  Sinuses nontender, no nasal exudate.  No buccal or pharyngeal lesions, erythema or exudate  Neck:  Supple, no adenopathy  Lungs:  Clear to auscultation  Cor:  RRR, S1, S2, no murmur appreciated  Abd:  Symmetric, normoactive BS.  Soft, nontender, no masses, guarding or rebound.  Liver and spleen not enlarged  Back:  Tender to palpation lower lumbar area  Extrem:  No cyanosis or edema.  Nl LE strength  Skin:  No rashes.        LABS:                        12.0   5.59  )-----------( 279      ( 19 Apr 2023 05:30 )             37.5     04-19    137  |  104  |  32<H>  ----------------------------<  96  4.8   |  25  |  1.95<H>    Ca    8.5      19 Apr 2023 05:30  Phos  4.0     04-19  Mg     2.0     04-19              MICROBIOLOGY:    Blood cultures:  4/14 X 2 - NGTD    RADIOLOGY & ADDITIONAL STUDIES:  < from: NM PET w/CT Skull to Thigh INF FDG (04.19.23 @ 12:42) >  IMPRESSION:  1. Focal uptake at L4-L5, within the disc space and adjacent endplates,   predominantly at the posterior aspect of the inferior endplate of L4   extending into the left facet joint, with associated bony destruction,   consistent with discitis/osteomyelitis in the appropriate clinical   setting.  2. Mild, diffuse uptake along the peritoneum in the upper abdomen   suggestive of peritonitis. Correlate clinically.  3. Severe left hydronephrosis with lack of activity in the left renal   collecting system suggestive of poor renal function on the left side. The   location of obstruction appears to be at the proximal ureter, possibly   the ureteropelvic junction.    < end of copied text >      < from: MR Lumbar Spine w/wo IV Cont (04.15.23 @ 17:55) >  FINDINGS: The MRI examination is limited by motion artifact. There is   hyperintense T2 signal within the L4/5 disc space with mild enhancement   along the margins. There is diffuse hypointense T1 and hyperintense T2   signal within the adjacent L4 and L5 endplates and vertebral bodies with   diffuse enhancement. The cortical irregularity along the endplates at   L4/5 is better seen prior lumbar spine CT. These findings are suggestive   of discitis/osteomyelitis. There is ventral epidural enhancing soft   tissue at the L4 and L5 levels indenting on the thecal sac (series 31   image 10 and series 34 images 35-36) without discrete fluid collection   may represent phlegmon. There is mild prevertebral enhancing soft tissue   with more pronounced paravertebral (left greater than right) enhancing   soft tissue (series 34 images 34-37). No discrete fluid collection is   identified within the soft tissue to suggest abscess. There is T2/STIR   signal abnormality within the dorsal paraspinal soft tissues suggestive   of edema.    There is Grade 2 anterolisthesis of L5 on S1 with severe loss of   intervertebral disc space height at L5/S. There also is T2 signal   abnormality with enhancement involving the residual L5/S1 disc space and   adjacent endplates. It is unclear whether this may represent additional   infection or alternatively degenerative changes given the anterolisthesis.    The remaining vertebral body heights and intervertebral disc spaces are   maintained. There is a left-sided subchondral cyst involving the L3   inferior endplate. The remaining vertebral body marrow signal appears   intact without enhancing lesions. The conus medullaris ends at L2/3 and   is low-lying.    IMPRESSION: Limited exam secondary to motion. Findings suggestive of   discitis/osteomyelitis at the L4/5 level with ventral enhancing epidural   soft tissue compressing the thecal sac as well as additional   predominantly paravertebral enhancing soft tissue (left greater than   right) without discrete fluid collection. Additional   discitis/osteomyelitis at L5/S1 versus degenerative changes.    --- End of Report ---    < end of copied text >

## 2023-04-19 NOTE — PROGRESS NOTE ADULT - ASSESSMENT
63 yo M with chronic LBP admitted following fall.  MRI concerning for OM/discitis L4/L5 with ventral enhancing ST compressing thecal sac and enhancing paravertebral ST without discrete collection.  He is afebrile, WBC, ESR and CRP are WNL.  However, he has received numerous injections for chronic pain.  Is at risk for organisms that may not be associated with signs of acute inflammation, including fungi and relatively low virulence bacteria (eg Cutibacterium acnes).  Would not want to start po antibiotics without knowing if he has infection and the causative organism.  Would not know if the process was chronic and if he has infection, would be unlikely to start with po antibiotics.    Suggest:  - F/U blood cultures 4/14, NGTD  - f/u Gallium scan, may obtain FDG-PET scan if gal not available   - Continue off antibiotics  Recommendations discussed with primary team.  Will follow with you - team 1. 61 yo M with chronic LBP admitted following fall.  MRI concerning for OM/discitis L4/L5 with ventral enhancing ST compressing thecal sac and enhancing paravertebral ST without discrete collection.  He is afebrile, WBC, ESR and CRP are WNL.  However, he has received numerous injections for chronic pain.  Is at risk for organisms that may not be associated with signs of acute inflammation, including fungi and relatively low virulence bacteria (eg Cutibacterium acnes).  Would not want to start po antibiotics without knowing if he has infection and the causative organism.  Would not know if the process was chronic and if he has infection, would be unlikely to start with po antibiotics. PET CT positive for focal uptake at L4-L5 and severe left hydronephrosis with possible UPJ obstruction.     Suggest:  - Continue off antibiotics  - Rec bone biopsy to confirm presence of OM and obtain cx for abx guidance.   Recommendations discussed with primary team.  Will follow with you - team 1.

## 2023-04-20 LAB
ALBUMIN SERPL ELPH-MCNC: 3.4 G/DL — SIGNIFICANT CHANGE UP (ref 3.3–5)
ALP SERPL-CCNC: 69 U/L — SIGNIFICANT CHANGE UP (ref 40–120)
ALT FLD-CCNC: 8 U/L — LOW (ref 10–45)
ANION GAP SERPL CALC-SCNC: 8 MMOL/L — SIGNIFICANT CHANGE UP (ref 5–17)
APTT BLD: 33.9 SEC — SIGNIFICANT CHANGE UP (ref 27.5–35.5)
AST SERPL-CCNC: 14 U/L — SIGNIFICANT CHANGE UP (ref 10–40)
BASOPHILS # BLD AUTO: 0.06 K/UL — SIGNIFICANT CHANGE UP (ref 0–0.2)
BASOPHILS NFR BLD AUTO: 1.2 % — SIGNIFICANT CHANGE UP (ref 0–2)
BILIRUB SERPL-MCNC: <0.2 MG/DL — SIGNIFICANT CHANGE UP (ref 0.2–1.2)
BLD GP AB SCN SERPL QL: NEGATIVE — SIGNIFICANT CHANGE UP
BUN SERPL-MCNC: 38 MG/DL — HIGH (ref 7–23)
CALCIUM SERPL-MCNC: 8.6 MG/DL — SIGNIFICANT CHANGE UP (ref 8.4–10.5)
CHLORIDE SERPL-SCNC: 105 MMOL/L — SIGNIFICANT CHANGE UP (ref 96–108)
CO2 SERPL-SCNC: 24 MMOL/L — SIGNIFICANT CHANGE UP (ref 22–31)
CREAT SERPL-MCNC: 2.1 MG/DL — HIGH (ref 0.5–1.3)
EGFR: 35 ML/MIN/1.73M2 — LOW
EOSINOPHIL # BLD AUTO: 0.29 K/UL — SIGNIFICANT CHANGE UP (ref 0–0.5)
EOSINOPHIL NFR BLD AUTO: 5.7 % — SIGNIFICANT CHANGE UP (ref 0–6)
GLUCOSE SERPL-MCNC: 107 MG/DL — HIGH (ref 70–99)
HCT VFR BLD CALC: 38.8 % — LOW (ref 39–50)
HGB BLD-MCNC: 12.4 G/DL — LOW (ref 13–17)
IMM GRANULOCYTES NFR BLD AUTO: 0.6 % — SIGNIFICANT CHANGE UP (ref 0–0.9)
INR BLD: 1.02 — SIGNIFICANT CHANGE UP (ref 0.88–1.16)
LYMPHOCYTES # BLD AUTO: 1.77 K/UL — SIGNIFICANT CHANGE UP (ref 1–3.3)
LYMPHOCYTES # BLD AUTO: 34.8 % — SIGNIFICANT CHANGE UP (ref 13–44)
MAGNESIUM SERPL-MCNC: 2.1 MG/DL — SIGNIFICANT CHANGE UP (ref 1.6–2.6)
MCHC RBC-ENTMCNC: 26.9 PG — LOW (ref 27–34)
MCHC RBC-ENTMCNC: 32 GM/DL — SIGNIFICANT CHANGE UP (ref 32–36)
MCV RBC AUTO: 84.2 FL — SIGNIFICANT CHANGE UP (ref 80–100)
MONOCYTES # BLD AUTO: 0.52 K/UL — SIGNIFICANT CHANGE UP (ref 0–0.9)
MONOCYTES NFR BLD AUTO: 10.2 % — SIGNIFICANT CHANGE UP (ref 2–14)
NEUTROPHILS # BLD AUTO: 2.41 K/UL — SIGNIFICANT CHANGE UP (ref 1.8–7.4)
NEUTROPHILS NFR BLD AUTO: 47.5 % — SIGNIFICANT CHANGE UP (ref 43–77)
NRBC # BLD: 0 /100 WBCS — SIGNIFICANT CHANGE UP (ref 0–0)
PHOSPHATE SERPL-MCNC: 4.3 MG/DL — SIGNIFICANT CHANGE UP (ref 2.5–4.5)
PLATELET # BLD AUTO: 295 K/UL — SIGNIFICANT CHANGE UP (ref 150–400)
POTASSIUM SERPL-MCNC: 5.1 MMOL/L — SIGNIFICANT CHANGE UP (ref 3.5–5.3)
POTASSIUM SERPL-SCNC: 5.1 MMOL/L — SIGNIFICANT CHANGE UP (ref 3.5–5.3)
PROT SERPL-MCNC: 6.5 G/DL — SIGNIFICANT CHANGE UP (ref 6–8.3)
PROTHROM AB SERPL-ACNC: 12.1 SEC — SIGNIFICANT CHANGE UP (ref 10.5–13.4)
RBC # BLD: 4.61 M/UL — SIGNIFICANT CHANGE UP (ref 4.2–5.8)
RBC # FLD: 15.3 % — HIGH (ref 10.3–14.5)
RH IG SCN BLD-IMP: POSITIVE — SIGNIFICANT CHANGE UP
SODIUM SERPL-SCNC: 137 MMOL/L — SIGNIFICANT CHANGE UP (ref 135–145)
WBC # BLD: 5.08 K/UL — SIGNIFICANT CHANGE UP (ref 3.8–10.5)
WBC # FLD AUTO: 5.08 K/UL — SIGNIFICANT CHANGE UP (ref 3.8–10.5)

## 2023-04-20 PROCEDURE — 78830 RP LOCLZJ TUM SPECT W/CT 1: CPT | Mod: 26

## 2023-04-20 PROCEDURE — 99232 SBSQ HOSP IP/OBS MODERATE 35: CPT | Mod: GC

## 2023-04-20 PROCEDURE — 99233 SBSQ HOSP IP/OBS HIGH 50: CPT | Mod: GC

## 2023-04-20 RX ORDER — SODIUM CHLORIDE 9 MG/ML
500 INJECTION, SOLUTION INTRAVENOUS ONCE
Refills: 0 | Status: DISCONTINUED | OUTPATIENT
Start: 2023-04-20 | End: 2023-04-21

## 2023-04-20 RX ADMIN — Medication 650 MILLIGRAM(S): at 12:35

## 2023-04-20 RX ADMIN — Medication 1 TABLET(S): at 12:03

## 2023-04-20 RX ADMIN — GABAPENTIN 100 MILLIGRAM(S): 400 CAPSULE ORAL at 19:16

## 2023-04-20 RX ADMIN — Medication 650 MILLIGRAM(S): at 19:16

## 2023-04-20 RX ADMIN — Medication 1 MILLIGRAM(S): at 12:03

## 2023-04-20 RX ADMIN — Medication 100 MILLIGRAM(S): at 12:04

## 2023-04-20 RX ADMIN — GABAPENTIN 100 MILLIGRAM(S): 400 CAPSULE ORAL at 06:28

## 2023-04-20 RX ADMIN — TRAMADOL HYDROCHLORIDE 25 MILLIGRAM(S): 50 TABLET ORAL at 12:03

## 2023-04-20 RX ADMIN — GABAPENTIN 100 MILLIGRAM(S): 400 CAPSULE ORAL at 12:04

## 2023-04-20 RX ADMIN — Medication 650 MILLIGRAM(S): at 12:08

## 2023-04-20 RX ADMIN — TRAMADOL HYDROCHLORIDE 25 MILLIGRAM(S): 50 TABLET ORAL at 12:35

## 2023-04-20 RX ADMIN — Medication 112 MICROGRAM(S): at 06:28

## 2023-04-20 NOTE — PROGRESS NOTE ADULT - ASSESSMENT
61 yo M with chronic LBP admitted following fall.  MRI concerning for OM/discitis L4/L5 with ventral enhancing ST compressing thecal sac and enhancing paravertebral ST without discrete collection.  He is afebrile, WBC, ESR and CRP are WNL.  However, he has received numerous injections for chronic pain.  Is at risk for organisms that may not be associated with signs of acute inflammation, including fungi and relatively low virulence bacteria (eg Cutibacterium acnes).  Would not want to start po antibiotics without knowing if he has infection and the causative organism.  Would not know if the process was chronic and if he has infection, would be unlikely to start with po antibiotics. PET CT positive for focal uptake at L4-L5 and severe left hydronephrosis with possible UPJ obstruction.     Suggest:  - Continue off antibiotics  - f/u IR  bone biopsy to confirm presence of OM and obtain cx for abx guidance  - No rec for outpt abx if pt decides to leave AMA. Pt needs to understand that he may likely have an infection of the back and abx may be needed for improvement.     Recommendations discussed with primary team.  Will follow with you - team 1.

## 2023-04-20 NOTE — PROGRESS NOTE ADULT - SUBJECTIVE AND OBJECTIVE BOX
INFECTIOUS DISEASE PROGRESS NOTE     INTERVAL/OVERNIGHT HX: No acute events overnight, pt afebrile. Sameer any discomfort to the back this morning. Denies any new complaints.     ROS:  CONSTITUTIONAL:  Negative fever or chills, feels well, good appetite  EYES:  Negative  blurry vision or double vision  CARDIOVASCULAR:  Negative for chest pain or palpitations  RESPIRATORY:  Negative for cough, wheezing, or SOB   GASTROINTESTINAL:  Negative for nausea, vomiting, diarrhea, constipation, or abdominal pain  GENITOURINARY:  Negative frequency, urgency or dysuria  NEUROLOGIC:  No headache, confusion, dizziness, lightheadedness  Allergies    No Known Allergies    Intolerances        ANTIBIOTICS/RELEVANT:  antimicrobials    immunologic:    OTHER:  acetaminophen     Tablet .. 650 milliGRAM(s) Oral every 6 hours PRN  folic acid 1 milliGRAM(s) Oral daily  gabapentin 100 milliGRAM(s) Oral <User Schedule>  levothyroxine 112 MICROGram(s) Oral daily  lidocaine   4% Patch 1 Patch Transdermal every 24 hours  multivitamin 1 Tablet(s) Oral daily  ondansetron Injectable 4 milliGRAM(s) IV Push every 8 hours PRN  thiamine 100 milliGRAM(s) Oral daily  traMADol 25 milliGRAM(s) Oral every 8 hours PRN      Objective:  Vital Signs Last 24 Hrs  T(C): 37.1 (20 Apr 2023 05:33), Max: 37.1 (20 Apr 2023 05:33)  T(F): 98.7 (20 Apr 2023 05:33), Max: 98.7 (20 Apr 2023 05:33)  HR: 67 (20 Apr 2023 05:33) (60 - 67)  BP: 106/62 (20 Apr 2023 05:33) (106/62 - 117/78)  BP(mean): 84 (19 Apr 2023 20:34) (84 - 84)  RR: 17 (20 Apr 2023 05:33) (16 - 18)  SpO2: 98% (20 Apr 2023 05:33) (96% - 98%)    Parameters below as of 20 Apr 2023 05:33  Patient On (Oxygen Delivery Method): room air        PHYSICAL EXAM:   Alert, in no distress  HEENT:  NC, PERRL, sclerae anicteric, conjunctivae clear.  Sinuses nontender, no nasal exudate.  No buccal or pharyngeal lesions, erythema or exudate  Neck:  Supple, no adenopathy  Lungs:  Clear to auscultation  Cor:  RRR, S1, S2, no murmur appreciated  Abd:  Symmetric, normoactive BS.  Soft, nontender, no masses, guarding or rebound.  Liver and spleen not enlarged  Back:  Tender to palpation lower lumbar area  Extrem:  No cyanosis or edema.  Nl LE strength  Skin:  No rashes.        LABS:                        12.4   5.08  )-----------( 295      ( 20 Apr 2023 05:30 )             38.8     04-20    137  |  105  |  38<H>  ----------------------------<  107<H>  5.1   |  24  |  2.10<H>    Ca    8.6      20 Apr 2023 05:30  Phos  4.3     04-20  Mg     2.1     04-20    TPro  6.5  /  Alb  3.4  /  TBili  <0.2  /  DBili  x   /  AST  14  /  ALT  8<L>  /  AlkPhos  69  04-20    PT/INR - ( 20 Apr 2023 05:30 )   PT: 12.1 sec;   INR: 1.02          PTT - ( 20 Apr 2023 05:30 )  PTT:33.9 sec          MICROBIOLOGY:    Blood cultures:  4/14 X 2 - NGTD    RADIOLOGY & ADDITIONAL STUDIES:  < from: NM PET w/CT Skull to Thigh INF FDG (04.19.23 @ 12:42) >  IMPRESSION:  1. Focal uptake at L4-L5, within the disc space and adjacent endplates,   predominantly at the posterior aspect of the inferior endplate of L4   extending into the left facet joint, with associated bony destruction,   consistent with discitis/osteomyelitis in the appropriate clinical   setting.  2. Mild, diffuse uptake along the peritoneum in the upper abdomen   suggestive of peritonitis. Correlate clinically.  3. Severe left hydronephrosis with lack of activity in the left renal   collecting system suggestive of poor renal function on the left side. The   location of obstruction appears to be at the proximal ureter, possibly   the ureteropelvic junction.    < end of copied text >      < from: MR Lumbar Spine w/wo IV Cont (04.15.23 @ 17:55) >  FINDINGS: The MRI examination is limited by motion artifact. There is   hyperintense T2 signal within the L4/5 disc space with mild enhancement   along the margins. There is diffuse hypointense T1 and hyperintense T2   signal within the adjacent L4 and L5 endplates and vertebral bodies with   diffuse enhancement. The cortical irregularity along the endplates at   L4/5 is better seen prior lumbar spine CT. These findings are suggestive   of discitis/osteomyelitis. There is ventral epidural enhancing soft   tissue at the L4 and L5 levels indenting on the thecal sac (series 31   image 10 and series 34 images 35-36) without discrete fluid collection   may represent phlegmon. There is mild prevertebral enhancing soft tissue   with more pronounced paravertebral (left greater than right) enhancing   soft tissue (series 34 images 34-37). No discrete fluid collection is   identified within the soft tissue to suggest abscess. There is T2/STIR   signal abnormality within the dorsal paraspinal soft tissues suggestive   of edema.    There is Grade 2 anterolisthesis of L5 on S1 with severe loss of   intervertebral disc space height at L5/S. There also is T2 signal   abnormality with enhancement involving the residual L5/S1 disc space and   adjacent endplates. It is unclear whether this may represent additional   infection or alternatively degenerative changes given the anterolisthesis.    The remaining vertebral body heights and intervertebral disc spaces are   maintained. There is a left-sided subchondral cyst involving the L3   inferior endplate. The remaining vertebral body marrow signal appears   intact without enhancing lesions. The conus medullaris ends at L2/3 and   is low-lying.    IMPRESSION: Limited exam secondary to motion. Findings suggestive of   discitis/osteomyelitis at the L4/5 level with ventral enhancing epidural   soft tissue compressing the thecal sac as well as additional   predominantly paravertebral enhancing soft tissue (left greater than   right) without discrete fluid collection. Additional   discitis/osteomyelitis at L5/S1 versus degenerative changes.    --- End of Report ---    < end of copied text >

## 2023-04-20 NOTE — PROGRESS NOTE ADULT - ASSESSMENT
61 yo M with chronic LBP admitted following fall.  MRI concerning for OM/discitis L4/L5 with ventral enhancing ST compressing thecal sac and enhancing paravertebral ST without discrete collection.  He is afebrile, WBC, ESR and CRP are WNL.  However, he has received numerous injections for chronic pain.  Is at risk for organisms that may not be associated with signs of acute inflammation, including fungi and relatively low virulence bacteria (eg Cutibacterium acnes).  Would not want to start po antibiotics without knowing if he has infection and the causative organism.  Would not know if the process was chronic and if he has infection, would be unlikely to start with po antibiotics. PET CT positive for focal uptake at L4-L5 and severe left hydronephrosis with possible UPJ obstruction.     Suggest:  - Continue off antibiotics  - Rec bone biopsy to confirm presence of OM and obtain cx for abx guidance.   Recommendations discussed with primary team.  Will follow with you - team 1.

## 2023-04-20 NOTE — PROGRESS NOTE ADULT - PROBLEM SELECTOR PLAN 6
no s/s of withdrawal. Nutritional support. SW consult.

## 2023-04-20 NOTE — PROGRESS NOTE ADULT - SUBJECTIVE AND OBJECTIVE BOX
LOUREJI TORRES, 62y, Male  MRN-[id number]  Patient is a 62y old  Male who presents with a chief complaint of Back pain (19 Apr 2023 12:55)      OVERNIGHT EVENTS:     SUBJECTIVE:    12 Point ROS Negative unless noted otherwise above.  -------------------------------------------------------------------------------  VITAL SIGNS:  Vital Signs Last 24 Hrs  T(C): 37.1 (20 Apr 2023 05:33), Max: 37.1 (20 Apr 2023 05:33)  T(F): 98.7 (20 Apr 2023 05:33), Max: 98.7 (20 Apr 2023 05:33)  HR: 67 (20 Apr 2023 05:33) (60 - 67)  BP: 106/62 (20 Apr 2023 05:33) (106/62 - 117/78)  BP(mean): 84 (19 Apr 2023 20:34) (84 - 84)  RR: 17 (20 Apr 2023 05:33) (16 - 18)  SpO2: 98% (20 Apr 2023 05:33) (96% - 98%)    Parameters below as of 20 Apr 2023 05:33  Patient On (Oxygen Delivery Method): room air      I&O's Summary      PHYSICAL EXAM:    General: NAD, well developed  HEENT: NC/AT; EOMI, PERRLA, anicteric sclera; moist mucosal membranes.  Neck: supple, trachea midline  Cardiovascular: RRR, +S1/S2; NO M/R/G  Respiratory: CTA B/L; no W/R/R  Gastrointestinal: soft, NT/ND; +BSx4  Extremities: WWP; no edema or cyanosis  Vascular: 2+ radial, DP/PT pulses B/L  Neurological: AAOx3; no focal deficits    ALLERGIES:  Allergies    No Known Allergies    Intolerances        MEDICATIONS:  MEDICATIONS  (STANDING):  folic acid 1 milliGRAM(s) Oral daily  gabapentin 100 milliGRAM(s) Oral <User Schedule>  levothyroxine 112 MICROGram(s) Oral daily  lidocaine   4% Patch 1 Patch Transdermal every 24 hours  multivitamin 1 Tablet(s) Oral daily  thiamine 100 milliGRAM(s) Oral daily    MEDICATIONS  (PRN):  acetaminophen     Tablet .. 650 milliGRAM(s) Oral every 6 hours PRN Temp greater or equal to 38C (100.4F), Mild Pain (1 - 3)  ondansetron Injectable 4 milliGRAM(s) IV Push every 8 hours PRN Nausea and/or Vomiting  traMADol 25 milliGRAM(s) Oral every 8 hours PRN Severe Pain (7 - 10)      -------------------------------------------------------------------------------  LABS:                        12.4   5.08  )-----------( 295      ( 20 Apr 2023 05:30 )             38.8     04-20    137  |  105  |  38<H>  ----------------------------<  107<H>  5.1   |  24  |  2.10<H>    Ca    8.6      20 Apr 2023 05:30  Phos  4.3     04-20  Mg     2.1     04-20    TPro  6.5  /  Alb  3.4  /  TBili  <0.2  /  DBili  x   /  AST  14  /  ALT  8<L>  /  AlkPhos  69  04-20    LIVER FUNCTIONS - ( 20 Apr 2023 05:30 )  Alb: 3.4 g/dL / Pro: 6.5 g/dL / ALK PHOS: 69 U/L / ALT: 8 U/L / AST: 14 U/L / GGT: x           PT/INR - ( 20 Apr 2023 05:30 )   PT: 12.1 sec;   INR: 1.02          PTT - ( 20 Apr 2023 05:30 )  PTT:33.9 sec    CAPILLARY BLOOD GLUCOSE          COVID-19 PCR: NotDetec (19 Apr 2023 16:37)  COVID-19 PCR: Negative (14 Apr 2023 01:32)      RADIOLOGY & ADDITIONAL TESTS: Reviewed.       REJI BLAKE, 62y, Male  MRN-[id number]  Patient is a 62y old  Male who presents with a chief complaint of Back pain (19 Apr 2023 12:55)      OVERNIGHT EVENTS: ALIRIO    SUBJECTIVE: Pt assessed at bedside, denies any new complaints. States pain in back is currently a 4/10, waxes and wains. Says gabapentin isnt doing much. Patient denies fever/chills, nausea, vomiting, headache, chest pain, palpitations, dyspnea, cough, abdominal pain, muscle weakness.    12 Point ROS Negative unless noted otherwise above.  -------------------------------------------------------------------------------  VITAL SIGNS:  Vital Signs Last 24 Hrs  T(C): 37.1 (20 Apr 2023 05:33), Max: 37.1 (20 Apr 2023 05:33)  T(F): 98.7 (20 Apr 2023 05:33), Max: 98.7 (20 Apr 2023 05:33)  HR: 67 (20 Apr 2023 05:33) (60 - 67)  BP: 106/62 (20 Apr 2023 05:33) (106/62 - 117/78)  BP(mean): 84 (19 Apr 2023 20:34) (84 - 84)  RR: 17 (20 Apr 2023 05:33) (16 - 18)  SpO2: 98% (20 Apr 2023 05:33) (96% - 98%)    Parameters below as of 20 Apr 2023 05:33  Patient On (Oxygen Delivery Method): room air      I&O's Summary      PHYSICAL EXAM:    GENERAL: Pt lying in bed comfortably in NAD  CHEST/LUNG: Clear to auscultation bilaterally; No rales, rhonchi, wheezing or rubs. Unlabored respirations  HEART: Regular rate and rhythm; No murmurs, rubs, or gallops  ABDOMEN: Bowel sounds present; Soft, Nontender, Nondistended. No guarding or rigidity    EXTREMITIES:  2+ Peripheral Pulses, brisk capillary refill. No clubbing, cyanosis, or edema  NERVOUS SYSTEM:  Alert & Oriented X3, speech clear. Answers questions appropriately. Facial movements symmetrical, no facial droop. Full and equal 5/5 strength B/L upper and lower extremities.   MSK: FROM x 4 extremities, mild lumbar back pain. Several fingers with DIP and PIP deformities.    ALLERGIES:  Allergies    No Known Allergies    Intolerances        MEDICATIONS:  MEDICATIONS  (STANDING):  folic acid 1 milliGRAM(s) Oral daily  gabapentin 100 milliGRAM(s) Oral <User Schedule>  levothyroxine 112 MICROGram(s) Oral daily  lidocaine   4% Patch 1 Patch Transdermal every 24 hours  multivitamin 1 Tablet(s) Oral daily  thiamine 100 milliGRAM(s) Oral daily    MEDICATIONS  (PRN):  acetaminophen     Tablet .. 650 milliGRAM(s) Oral every 6 hours PRN Temp greater or equal to 38C (100.4F), Mild Pain (1 - 3)  ondansetron Injectable 4 milliGRAM(s) IV Push every 8 hours PRN Nausea and/or Vomiting  traMADol 25 milliGRAM(s) Oral every 8 hours PRN Severe Pain (7 - 10)      -------------------------------------------------------------------------------  LABS:                        12.4   5.08  )-----------( 295      ( 20 Apr 2023 05:30 )             38.8     04-20    137  |  105  |  38<H>  ----------------------------<  107<H>  5.1   |  24  |  2.10<H>    Ca    8.6      20 Apr 2023 05:30  Phos  4.3     04-20  Mg     2.1     04-20    TPro  6.5  /  Alb  3.4  /  TBili  <0.2  /  DBili  x   /  AST  14  /  ALT  8<L>  /  AlkPhos  69  04-20    LIVER FUNCTIONS - ( 20 Apr 2023 05:30 )  Alb: 3.4 g/dL / Pro: 6.5 g/dL / ALK PHOS: 69 U/L / ALT: 8 U/L / AST: 14 U/L / GGT: x           PT/INR - ( 20 Apr 2023 05:30 )   PT: 12.1 sec;   INR: 1.02          PTT - ( 20 Apr 2023 05:30 )  PTT:33.9 sec    CAPILLARY BLOOD GLUCOSE          COVID-19 PCR: NotDetec (19 Apr 2023 16:37)  COVID-19 PCR: Negative (14 Apr 2023 01:32)      RADIOLOGY & ADDITIONAL TESTS: Reviewed.

## 2023-04-20 NOTE — PROGRESS NOTE ADULT - PROBLEM SELECTOR PLAN 3
History of chronic back pain for years , CT lumbars as above, concern for osteomyelitis. less likely due to no fever, no chills, no weight loss, never IVDU.  no leukocytosis. CRP 7.8. No BM/urinary incontinence  - No neurosurgical intervention warranted at this time  - see osteomyelitis above  - c/w pain regimen - lidocaine patches, tylenol mild pain, gabapentin, tramadol severe pain

## 2023-04-20 NOTE — PROGRESS NOTE ADULT - PROBLEM/PLAN-2
DISPLAY PLAN FREE TEXT
Statement Selected

## 2023-04-20 NOTE — PROGRESS NOTE ADULT - PROBLEM SELECTOR PLAN 7
F: None  E: replete as needed   N: regular  GI ppx: none  DVT ppx: Lovenox  CODE: FULL  Dispo: [facility]

## 2023-04-20 NOTE — PROGRESS NOTE ADULT - PROBLEM SELECTOR PLAN 1
MR lumbar showing discitis vs M at L4-L5 with ventral enhancing epidural soft tissue compressing theacal sac and L5/S1 disciitis/OM vs degenerative changes. Patient has hx of back injections for pain management, most recently about 1 month ago. Normal WBC and ESR  - ID consulted; f/u recs  - PET scan showing focal uptake at L4-L5, within the disc space and adjacent endplates, predominantly at the posterior aspect of the inferior endplate of L4 extending into the left facet joint, with associated bony destruction,   consistent with discitis/osteomyelitis  - f/u Bone spect scan  - IR consulted for biopsy, likely today

## 2023-04-20 NOTE — HISTORY OF PRESENT ILLNESS
[FreeTextEntry1] : Name REJI BLAKE \par MRN [id number] \par  Aug 13 1960 \par ------------------------------------------------------------------------------------------------------------------------------------------- \par Date of First Visit: ******* \par Referring Provider/PCP: ***** / ******** \par ------------------------------------------------------------------------------------------------------------------------------------------- \par CC: lefy hydronephrosis\par \par History of Present Illness: REJI BLAKE is a 62 year male who presents for left hydronephrosis. Recently admitted for chronic lower back pain vs. chronic osteomyelitis of lumbar spine, head injury from fall in setting of alcohol intoxication, also noted to have CHITO vs. CKD with Cr ~2, unknown baseline. UA showed proteinuria, PVR was 0 cc, urine electrolytes s/o pre-renal etiology. No known hx of CKD. CT showed severe left-sided hydronephrosis with thinning of the renal parenchyma suggestive of chronic UPJ obstruction. This was corroborated by renal US showing left hydro and evidence of medical renal disease. Urinates well. \par \par PVR *******\par \par probable chronic UPJ obstruction and CKD\par

## 2023-04-21 ENCOUNTER — TRANSCRIPTION ENCOUNTER (OUTPATIENT)
Age: 63
End: 2023-04-21

## 2023-04-21 ENCOUNTER — APPOINTMENT (OUTPATIENT)
Dept: UROLOGY | Facility: CLINIC | Age: 63
End: 2023-04-21

## 2023-04-21 VITALS
SYSTOLIC BLOOD PRESSURE: 118 MMHG | RESPIRATION RATE: 18 BRPM | DIASTOLIC BLOOD PRESSURE: 76 MMHG | HEART RATE: 56 BPM | TEMPERATURE: 100 F | OXYGEN SATURATION: 97 %

## 2023-04-21 LAB
% GAMMA, URINE: 15.3 % — SIGNIFICANT CHANGE UP
ALBUMIN 24H MFR UR ELPH: 29.5 % — SIGNIFICANT CHANGE UP
ALBUMIN SERPL ELPH-MCNC: 3.4 G/DL — SIGNIFICANT CHANGE UP (ref 3.3–5)
ALP SERPL-CCNC: 67 U/L — SIGNIFICANT CHANGE UP (ref 40–120)
ALPHA1 GLOB 24H MFR UR ELPH: 21.3 % — SIGNIFICANT CHANGE UP
ALPHA2 GLOB 24H MFR UR ELPH: 16.7 % — SIGNIFICANT CHANGE UP
ALT FLD-CCNC: 7 U/L — LOW (ref 10–45)
ANION GAP SERPL CALC-SCNC: 8 MMOL/L — SIGNIFICANT CHANGE UP (ref 5–17)
APTT BLD: 32.4 SEC — SIGNIFICANT CHANGE UP (ref 27.5–35.5)
AST SERPL-CCNC: 11 U/L — SIGNIFICANT CHANGE UP (ref 10–40)
B-GLOBULIN 24H MFR UR ELPH: 17.2 % — SIGNIFICANT CHANGE UP
BASOPHILS # BLD AUTO: 0.06 K/UL — SIGNIFICANT CHANGE UP (ref 0–0.2)
BASOPHILS NFR BLD AUTO: 1 % — SIGNIFICANT CHANGE UP (ref 0–2)
BILIRUB SERPL-MCNC: 0.2 MG/DL — SIGNIFICANT CHANGE UP (ref 0.2–1.2)
BLD GP AB SCN SERPL QL: NEGATIVE — SIGNIFICANT CHANGE UP
BUN SERPL-MCNC: 37 MG/DL — HIGH (ref 7–23)
CALCIUM SERPL-MCNC: 8.2 MG/DL — LOW (ref 8.4–10.5)
CHLORIDE SERPL-SCNC: 102 MMOL/L — SIGNIFICANT CHANGE UP (ref 96–108)
CO2 SERPL-SCNC: 24 MMOL/L — SIGNIFICANT CHANGE UP (ref 22–31)
CREAT SERPL-MCNC: 1.92 MG/DL — HIGH (ref 0.5–1.3)
EGFR: 39 ML/MIN/1.73M2 — LOW
EOSINOPHIL # BLD AUTO: 0.34 K/UL — SIGNIFICANT CHANGE UP (ref 0–0.5)
EOSINOPHIL NFR BLD AUTO: 5.9 % — SIGNIFICANT CHANGE UP (ref 0–6)
GLUCOSE SERPL-MCNC: 95 MG/DL — SIGNIFICANT CHANGE UP (ref 70–99)
GRAM STN FLD: SIGNIFICANT CHANGE UP
HCT VFR BLD CALC: 37.4 % — LOW (ref 39–50)
HGB BLD-MCNC: 12 G/DL — LOW (ref 13–17)
IMM GRANULOCYTES NFR BLD AUTO: 0.5 % — SIGNIFICANT CHANGE UP (ref 0–0.9)
INR BLD: 1.06 — SIGNIFICANT CHANGE UP (ref 0.88–1.16)
INTERPRETATION 24H UR IFE-IMP: SIGNIFICANT CHANGE UP
INTERPRETATION 24H UR IFE-IMP: SIGNIFICANT CHANGE UP
LYMPHOCYTES # BLD AUTO: 1.96 K/UL — SIGNIFICANT CHANGE UP (ref 1–3.3)
LYMPHOCYTES # BLD AUTO: 34.1 % — SIGNIFICANT CHANGE UP (ref 13–44)
M PROTEIN 24H UR ELPH-MRATE: SIGNIFICANT CHANGE UP
MAGNESIUM SERPL-MCNC: 2 MG/DL — SIGNIFICANT CHANGE UP (ref 1.6–2.6)
MCHC RBC-ENTMCNC: 27 PG — SIGNIFICANT CHANGE UP (ref 27–34)
MCHC RBC-ENTMCNC: 32.1 GM/DL — SIGNIFICANT CHANGE UP (ref 32–36)
MCV RBC AUTO: 84.2 FL — SIGNIFICANT CHANGE UP (ref 80–100)
MONOCYTES # BLD AUTO: 0.7 K/UL — SIGNIFICANT CHANGE UP (ref 0–0.9)
MONOCYTES NFR BLD AUTO: 12.2 % — SIGNIFICANT CHANGE UP (ref 2–14)
NEUTROPHILS # BLD AUTO: 2.66 K/UL — SIGNIFICANT CHANGE UP (ref 1.8–7.4)
NEUTROPHILS NFR BLD AUTO: 46.3 % — SIGNIFICANT CHANGE UP (ref 43–77)
NRBC # BLD: 0 /100 WBCS — SIGNIFICANT CHANGE UP (ref 0–0)
PHOSPHATE SERPL-MCNC: 3.9 MG/DL — SIGNIFICANT CHANGE UP (ref 2.5–4.5)
PLATELET # BLD AUTO: 263 K/UL — SIGNIFICANT CHANGE UP (ref 150–400)
POTASSIUM SERPL-MCNC: 4.8 MMOL/L — SIGNIFICANT CHANGE UP (ref 3.5–5.3)
POTASSIUM SERPL-SCNC: 4.8 MMOL/L — SIGNIFICANT CHANGE UP (ref 3.5–5.3)
PROT PATTERN 24H UR ELPH-IMP: SIGNIFICANT CHANGE UP
PROT SERPL-MCNC: 6.2 G/DL — SIGNIFICANT CHANGE UP (ref 6–8.3)
PROTHROM AB SERPL-ACNC: 12.6 SEC — SIGNIFICANT CHANGE UP (ref 10.5–13.4)
RBC # BLD: 4.44 M/UL — SIGNIFICANT CHANGE UP (ref 4.2–5.8)
RBC # FLD: 15.6 % — HIGH (ref 10.3–14.5)
RH IG SCN BLD-IMP: POSITIVE — SIGNIFICANT CHANGE UP
SODIUM SERPL-SCNC: 134 MMOL/L — LOW (ref 135–145)
SPECIMEN SOURCE: SIGNIFICANT CHANGE UP
TOTAL VOLUME - 24 HOUR: SIGNIFICANT CHANGE UP ML
URINE CREATININE CALCULATION: SIGNIFICANT CHANGE UP G/24 H (ref 1–2)
WBC # BLD: 5.75 K/UL — SIGNIFICANT CHANGE UP (ref 3.8–10.5)
WBC # FLD AUTO: 5.75 K/UL — SIGNIFICANT CHANGE UP (ref 3.8–10.5)

## 2023-04-21 PROCEDURE — 86803 HEPATITIS C AB TEST: CPT

## 2023-04-21 PROCEDURE — 83521 IG LIGHT CHAINS FREE EACH: CPT

## 2023-04-21 PROCEDURE — 72131 CT LUMBAR SPINE W/O DYE: CPT | Mod: MA

## 2023-04-21 PROCEDURE — U0005: CPT

## 2023-04-21 PROCEDURE — 77012 CT SCAN FOR NEEDLE BIOPSY: CPT

## 2023-04-21 PROCEDURE — 97161 PT EVAL LOW COMPLEX 20 MIN: CPT

## 2023-04-21 PROCEDURE — 70450 CT HEAD/BRAIN W/O DYE: CPT | Mod: MA

## 2023-04-21 PROCEDURE — 85025 COMPLETE CBC W/AUTO DIFF WBC: CPT

## 2023-04-21 PROCEDURE — 86900 BLOOD TYPING SEROLOGIC ABO: CPT

## 2023-04-21 PROCEDURE — 72125 CT NECK SPINE W/O DYE: CPT | Mod: MA

## 2023-04-21 PROCEDURE — 84165 PROTEIN E-PHORESIS SERUM: CPT

## 2023-04-21 PROCEDURE — 87102 FUNGUS ISOLATION CULTURE: CPT

## 2023-04-21 PROCEDURE — 76770 US EXAM ABDO BACK WALL COMP: CPT

## 2023-04-21 PROCEDURE — 86140 C-REACTIVE PROTEIN: CPT

## 2023-04-21 PROCEDURE — C1830: CPT

## 2023-04-21 PROCEDURE — 87206 SMEAR FLUORESCENT/ACID STAI: CPT

## 2023-04-21 PROCEDURE — A9585: CPT

## 2023-04-21 PROCEDURE — U0003: CPT

## 2023-04-21 PROCEDURE — 87015 SPECIMEN INFECT AGNT CONCNTJ: CPT

## 2023-04-21 PROCEDURE — 84100 ASSAY OF PHOSPHORUS: CPT

## 2023-04-21 PROCEDURE — 85730 THROMBOPLASTIN TIME PARTIAL: CPT

## 2023-04-21 PROCEDURE — 86850 RBC ANTIBODY SCREEN: CPT

## 2023-04-21 PROCEDURE — 85027 COMPLETE CBC AUTOMATED: CPT

## 2023-04-21 PROCEDURE — 78830 RP LOCLZJ TUM SPECT W/CT 1: CPT

## 2023-04-21 PROCEDURE — 86901 BLOOD TYPING SEROLOGIC RH(D): CPT

## 2023-04-21 PROCEDURE — 82962 GLUCOSE BLOOD TEST: CPT

## 2023-04-21 PROCEDURE — 86334 IMMUNOFIX E-PHORESIS SERUM: CPT

## 2023-04-21 PROCEDURE — 36415 COLL VENOUS BLD VENIPUNCTURE: CPT

## 2023-04-21 PROCEDURE — 87635 SARS-COV-2 COVID-19 AMP PRB: CPT

## 2023-04-21 PROCEDURE — 81001 URINALYSIS AUTO W/SCOPE: CPT

## 2023-04-21 PROCEDURE — 20206 BIOPSY MUSCLE PERQ NEEDLE: CPT

## 2023-04-21 PROCEDURE — G0103: CPT

## 2023-04-21 PROCEDURE — 97116 GAIT TRAINING THERAPY: CPT

## 2023-04-21 PROCEDURE — 85610 PROTHROMBIN TIME: CPT

## 2023-04-21 PROCEDURE — 99239 HOSP IP/OBS DSCHRG MGMT >30: CPT | Mod: GC

## 2023-04-21 PROCEDURE — 83735 ASSAY OF MAGNESIUM: CPT

## 2023-04-21 PROCEDURE — 77012 CT SCAN FOR NEEDLE BIOPSY: CPT | Mod: 26

## 2023-04-21 PROCEDURE — 80307 DRUG TEST PRSMV CHEM ANLYZR: CPT

## 2023-04-21 PROCEDURE — 84166 PROTEIN E-PHORESIS/URINE/CSF: CPT

## 2023-04-21 PROCEDURE — A9552: CPT

## 2023-04-21 PROCEDURE — 99232 SBSQ HOSP IP/OBS MODERATE 35: CPT

## 2023-04-21 PROCEDURE — 80048 BASIC METABOLIC PNL TOTAL CA: CPT

## 2023-04-21 PROCEDURE — 84439 ASSAY OF FREE THYROXINE: CPT

## 2023-04-21 PROCEDURE — 99285 EMERGENCY DEPT VISIT HI MDM: CPT | Mod: 25

## 2023-04-21 PROCEDURE — 87040 BLOOD CULTURE FOR BACTERIA: CPT

## 2023-04-21 PROCEDURE — 72158 MRI LUMBAR SPINE W/O & W/DYE: CPT

## 2023-04-21 PROCEDURE — 82570 ASSAY OF URINE CREATININE: CPT

## 2023-04-21 PROCEDURE — 87116 MYCOBACTERIA CULTURE: CPT

## 2023-04-21 PROCEDURE — 84300 ASSAY OF URINE SODIUM: CPT

## 2023-04-21 PROCEDURE — 84155 ASSAY OF PROTEIN SERUM: CPT

## 2023-04-21 PROCEDURE — 87075 CULTR BACTERIA EXCEPT BLOOD: CPT

## 2023-04-21 PROCEDURE — 85652 RBC SED RATE AUTOMATED: CPT

## 2023-04-21 PROCEDURE — 80053 COMPREHEN METABOLIC PANEL: CPT

## 2023-04-21 PROCEDURE — 84443 ASSAY THYROID STIM HORMONE: CPT

## 2023-04-21 PROCEDURE — 78816 PET IMAGE W/CT FULL BODY: CPT

## 2023-04-21 PROCEDURE — 87070 CULTURE OTHR SPECIMN AEROBIC: CPT

## 2023-04-21 RX ORDER — TRAMADOL HYDROCHLORIDE 50 MG/1
0.5 TABLET ORAL
Qty: 7.5 | Refills: 0
Start: 2023-04-21 | End: 2023-04-25

## 2023-04-21 RX ADMIN — TRAMADOL HYDROCHLORIDE 25 MILLIGRAM(S): 50 TABLET ORAL at 09:52

## 2023-04-21 RX ADMIN — Medication 112 MICROGRAM(S): at 06:21

## 2023-04-21 RX ADMIN — TRAMADOL HYDROCHLORIDE 25 MILLIGRAM(S): 50 TABLET ORAL at 02:16

## 2023-04-21 RX ADMIN — Medication 100 MILLIGRAM(S): at 12:53

## 2023-04-21 RX ADMIN — Medication 1 MILLIGRAM(S): at 12:53

## 2023-04-21 RX ADMIN — Medication 1 TABLET(S): at 12:53

## 2023-04-21 RX ADMIN — TRAMADOL HYDROCHLORIDE 25 MILLIGRAM(S): 50 TABLET ORAL at 03:16

## 2023-04-21 RX ADMIN — GABAPENTIN 100 MILLIGRAM(S): 400 CAPSULE ORAL at 21:02

## 2023-04-21 RX ADMIN — GABAPENTIN 100 MILLIGRAM(S): 400 CAPSULE ORAL at 12:53

## 2023-04-21 RX ADMIN — GABAPENTIN 100 MILLIGRAM(S): 400 CAPSULE ORAL at 06:20

## 2023-04-21 NOTE — DISCHARGE NOTE NURSING/CASE MANAGEMENT/SOCIAL WORK - NSDCPEFALRISK_GEN_ALL_CORE
For information on Fall & Injury Prevention, visit: [email or web address] OR  [email or web address] OR  https://www.cdc.gov/steadi/patient.html

## 2023-04-21 NOTE — PROGRESS NOTE ADULT - PROVIDER SPECIALTY LIST ADULT
Infectious Disease
Internal Medicine
Infectious Disease
Infectious Disease
Internal Medicine

## 2023-04-21 NOTE — DISCHARGE NOTE NURSING/CASE MANAGEMENT/SOCIAL WORK - PATIENT PORTAL LINK FT
You can access the FollowMyHealth Patient Portal offered by [facility] by registering at the following website: [email or web address]. By joining [employer]’s FollowMyHealth portal, you will also be able to view your health information using other applications (apps) compatible with our system.

## 2023-04-21 NOTE — PROGRESS NOTE ADULT - SUBJECTIVE AND OBJECTIVE BOX
SUBJECTIVE / INTERVAL HPI: Patient seen and examined at bedside. Reports he is feeling so-so. Pain in back is not bad but mild. Remaining afebrile. NAEO.  Remaining ROS negative       PHYSICAL EXAM:    General: resting comfortably in bed in NAD  HEENT: NC/AT; PERRL, anicteric sclera; MMM  Neck: supple  Cardiovascular: +S1/S2, RRR  Respiratory: CTA B/L; no W/R/R  Gastrointestinal: soft, NT/ND; +BSx4  Extremities: WWP; no edema, clubbing or cyanosis. +TTP midline lumbar spine   Neurological: AAOx3; no focal deficits    VITAL SIGNS:  Vital Signs Last 24 Hrs  T(C): 36.3 (21 Apr 2023 05:40), Max: 36.7 (20 Apr 2023 21:26)  T(F): 97.3 (21 Apr 2023 05:40), Max: 98 (20 Apr 2023 21:26)  HR: 60 (21 Apr 2023 05:40) (60 - 65)  BP: 105/65 (21 Apr 2023 05:40) (105/65 - 112/68)  BP(mean): --  RR: 17 (21 Apr 2023 05:40) (17 - 18)  SpO2: 100% (21 Apr 2023 05:40) (98% - 100%)    Parameters below as of 21 Apr 2023 05:40  Patient On (Oxygen Delivery Method): room air          MEDICATIONS:  MEDICATIONS  (STANDING):  folic acid 1 milliGRAM(s) Oral daily  gabapentin 100 milliGRAM(s) Oral <User Schedule>  lactated ringers Bolus 500 milliLiter(s) IV Bolus once  levothyroxine 112 MICROGram(s) Oral daily  lidocaine   4% Patch 1 Patch Transdermal every 24 hours  multivitamin 1 Tablet(s) Oral daily  thiamine 100 milliGRAM(s) Oral daily    MEDICATIONS  (PRN):  acetaminophen     Tablet .. 650 milliGRAM(s) Oral every 6 hours PRN Temp greater or equal to 38C (100.4F), Mild Pain (1 - 3)  ondansetron Injectable 4 milliGRAM(s) IV Push every 8 hours PRN Nausea and/or Vomiting  traMADol 25 milliGRAM(s) Oral every 8 hours PRN Severe Pain (7 - 10)      ALLERGIES:  Allergies    No Known Allergies    Intolerances        LABS:                        12.0   5.75  )-----------( 263      ( 21 Apr 2023 08:28 )             37.4     04-21    134<L>  |  102  |  37<H>  ----------------------------<  95  4.8   |  24  |  1.92<H>    Ca    8.2<L>      21 Apr 2023 08:28  Phos  3.9     04-21  Mg     2.0     04-21    TPro  6.2  /  Alb  3.4  /  TBili  0.2  /  DBili  x   /  AST  11  /  ALT  7<L>  /  AlkPhos  67  04-21    PT/INR - ( 21 Apr 2023 08:28 )   PT: 12.6 sec;   INR: 1.06          PTT - ( 21 Apr 2023 08:28 )  PTT:32.4 sec    CAPILLARY BLOOD GLUCOSE          RADIOLOGY & ADDITIONAL TESTS: Reviewed.

## 2023-04-21 NOTE — PROGRESS NOTE ADULT - ASSESSMENT
62M with PMHx hypothyroidism BIBEMS from outside facility ([facility]) BIBEMS for lower back pain s/p mechanical fall i/s/o etOH use found to have CT lumbar spine with L4-L5 sclerotic change for lytic lesion vs OM and severe UPJ obstruction. MRI with evidence of discitis vs OM at L4-L5 with ventral enhancing epidural soft tissue compressing theacal sac and L5/S1 disciitis/OM vs degenerative changes. ID consulted for management of spinal L4-L5 and L5-S1 OM.     BCs 4/14 NGTD     PET/NM scan 4/19: Focal uptake at L4-L5, within the disc space and adjacent endplates, predominantly at the posterior aspect of the inferior endplate of L4 extending into the left facet joint, with associated bony destruction, consistent with discitis/osteomyelitis in the appropriate clinical setting.  MRI lumbar spine 4/15: Limited exam secondary to motion. Findings suggestive of discitis/osteomyelitis at the L4/5 level with ventral enhancing epidural soft tissue compressing the thecal sac as well as additional predominantly paravertebral enhancing soft tissue (left greater than right) without discrete fluid collection. Additional discitis/osteomyelitis at L5/S1 versus degenerative changes.    RECOMMENDATIONS:   -s/p IR bone biopsy of L4-L5 OM/discitis today; f/u cultures/pathology   -standard of care would be to initiate IV abx while biopsy culture results pending; patient would require d/c to [facility] as he would be unable to OPAT himself however patient is adamantly refusing [facility]. Since he remains afebrile without leukocytosis, will f/u culture results and continue to follow the patient as an outpatient     ID team 1 will sign off   Recommendations not final until attested by attending

## 2023-04-21 NOTE — DISCHARGE NOTE NURSING/CASE MANAGEMENT/SOCIAL WORK - NSDCFUADDAPPT_GEN_ALL_CORE_FT
Please go to your follow up appointment with Dr. Jiménez, your urologist on 4/21/23 at 11:40AM. Please call [phone] if you have further questions about this appointment.     Please get evaluated by your primary care doctor within 2 weeks of leaving the hospital. You have told me by beside that you would like to be set up with the [facility] primary care doctors; I have given them your information and they will reach out to your directly to schedule the appointment. If you have further questions about this process, please call [phone].    Please go to your follow up appointment with Dr. Jiménez, your infectious disease doctor. She will further explore the results of the test we did the day you left the hospital, what infections may be there, and what anti-infection medications you may need. She has been given your information, and her office will reach out to you directly to schedule the appointment. If you have further questions about this, please call [phone].

## 2023-04-21 NOTE — PROGRESS NOTE ADULT - ATTENDING COMMENTS
62-year-old male with no significant PMHx who presented with acute on chronic lower back pain following a fall in setting of alcohol intoxication.        #Acute on Chronic Lower Back Pain   #Concern for Discitis/OM     -CT-Lumbar: suggestive of advanced degenerative changes vs. remote discitis vs. acute OM     -MRI: suggestive of L4-L5 discitis/osteomyelitis with ventral enhancing epidural soft tissue compressing the thecal sac    -BCx (4/14): NGTD    -ID consulted, PET/CT also consistent with discitis/OM, pending IR biopsy, monitor off Abx    -NSGY consulted, no acute intervention indicated       #Hypothyroidism     -continue levothyroxine 112mcg daily       #CHITO vs. CKD  -unclear baseline creatinine but no self-reported history of CKD, obtain collateral if able    -renal US with left hydronephrosis (likely chronic), PVR negative, UA with mild protein, urine electrolytes suggestive of pre-renal       #Severe Left Hydronephrosis      -reported stent placement in the past that was "removed too soon" at OSH     -urology consult, no urgent intervention, outpatient follow up       #Lytic Lesions    -incidental finding on CT    -PSA within normal limits     -SPEP, UPEP and free light chains not suggestive of MM    DVT PPx: SQH    Dispo: home, pending biopsy results
As above.  Discussed with Mr. Healy that I would recommend course of IV antibiotics to treat his infection with tailoring agents based upon biopsy culture.  He adamantly is refusing [facility] and is incapable of self-administering antibiotics. Remains afebrile, no leukocytosis.  Will f/u culture and arrange for outpatient f/u with me.  Please recall if further ID input is desired - team 1.
As above.  Would evaluate further for OM/Discitis L4/L5 as above.  Continue off antibiotics.  Will follow with you - team 1.
PET scan c/c focus of OM/discitis L4-L5.  He is at risk for infection from unusual organisms.  Would do IR biopsy to guide therapy, continue off antibiotics for now.  Will follow with you - team 1.
Workup for OM/discitis L4/L5 with possible ventral phlegmon is ongoing.  Awaiting gallium vs. PET.  Continue off antibiotics.  Will follow with you - team 1.
PET scan c/c focus of OM/discitis L4-L5.  He is at risk for infection from unusual organisms.  Would do IR biopsy to guide therapy, continue off antibiotics for now.  Will follow with you - team 1.
62-year-old male with no significant PMHx who presented with acute on chronic lower back pain following a fall in setting of alcohol intoxication.        #Discitis/OM     -CT-Lumbar: suggestive of advanced degenerative changes vs. remote discitis vs. acute OM     -MRI: suggestive of L4-L5 discitis/osteomyelitis with ventral enhancing epidural soft tissue compressing the thecal sac    -BCx (4/14): NGTD    -ID consulted, monitor off antibiotics and obtain PET scan (completed, pending read)  -NSGY consulted, no acute intervention indicated       #Hypothyroidism     -continue levothyroxine 112mcg daily       #CHITO     -unclear baseline creatinine but no self-reported history of CKD, obtain collateral if able    -renal US with left hydronephrosis (likely chronic), PVR negative, UA with mild protein, urine electrolytes suggestive of pre-renal       #Severe Left Hydronephrosis      -reported stent placement in the past that was "removed too soon" at OSH     -urology consult, no urgent intervention, obtain collateral if able, outpatient follow up       #Lytic Lesions    -incidental finding on CT    -PSA within normal limits     -SPEP, UPEP and free light chains not suggestive of MM        DVT PPx: SQH    Dispo: home, pending clinical improvement
62-year-old male with no significant PMHx who presented with acute on chronic lower back pain following a fall in setting of alcohol intoxication.        #Discitis/OM     -CT-Lumbar: suggestive of advanced degenerative changes vs. remote discitis vs. acute OM     -MRI: suggestive of L4-L5 discitis/osteomyelitis with ventral enhancing epidural soft tissue compressing the thecal sac    -BCx (4/14): NGTD    -ID consulted, monitor off antibiotics and obtain PET scan +/- IR-guided biopsy    -NSGY consulted, no acute intervention indicated       #Hypothyroidism     -continue levothyroxine 112mcg daily         #CHITO vs. CKD     -unclear baseline creatinine but no self-reported history of CKD, obtain collateral if able    -renal US with left hydronephrosis (likely chronic), PVR negative, UA with mild protein, urine electrolytes suggestive of pre-renal       #Severe Left Hydronephrosis      -reported stent placement in the past that was "removed too soon" at OSH     -urology consult, no urgent intervention, obtain collateral if able, outpatient follow up       #Lytic Lesions    -incidental finding on CT    -PSA within normal limits     -SPEP, UPEP and free light chains not suggestive of MM        DVT PPx: SQH    Dispo: home
62-year-old male with no significant PMHx who presented with acute on chronic lower back pain following a fall in setting of alcohol intoxication.       #Discitis/OM    -CT-Lumbar: suggestive of advanced degenerative changes vs. remote discitis vs. acute OM    -MRI: suggestive of L4-L5 discitis/osteomyelitis with ventral enhancing epidural soft tissue compressing the thecal sac   -BCx (4/14): NGTD   -ID consulted, monitor off antibiotics and obtain PET scan +/- IR-guided biopsy   -NSGY consulted, no acute intervention indicated      #Hypothyroidism    -continue levothyroxine 112mcg daily      #CHITO    -unclear baseline creatinine but no self-reported history of CKD, obtain collateral if able   -renal US with left hydronephrosis (likely chronic), PVR negative, UA with mild protein, urine electrolytes suggestive of pre-renal      #Severe Left Hydronephrosis     -reported stent placement in the past that was "removed too soon" at OSH    -urology consult, no urgent intervention, obtain collateral if able, outpatient follow up      #Lytic Lesions   -incidental finding on CT   -PSA within normal limits    -follow up SPEP, UPEP and free light chains      DVT PPx: SQH    Dispo: home PT pending clinical improvement

## 2023-04-22 LAB
NIGHT BLUE STAIN TISS: SIGNIFICANT CHANGE UP
SPECIMEN SOURCE: SIGNIFICANT CHANGE UP

## 2023-04-28 DIAGNOSIS — F10.129 ALCOHOL ABUSE WITH INTOXICATION, UNSPECIFIED: ICD-10-CM

## 2023-04-28 DIAGNOSIS — E03.9 HYPOTHYROIDISM, UNSPECIFIED: ICD-10-CM

## 2023-04-28 DIAGNOSIS — Z20.822 CONTACT WITH AND (SUSPECTED) EXPOSURE TO COVID-19: ICD-10-CM

## 2023-04-28 DIAGNOSIS — N13.0 HYDRONEPHROSIS WITH URETEROPELVIC JUNCTION OBSTRUCTION: ICD-10-CM

## 2023-04-28 DIAGNOSIS — F12.90 CANNABIS USE, UNSPECIFIED, UNCOMPLICATED: ICD-10-CM

## 2023-04-28 DIAGNOSIS — N18.9 CHRONIC KIDNEY DISEASE, UNSPECIFIED: ICD-10-CM

## 2023-04-28 DIAGNOSIS — N17.9 ACUTE KIDNEY FAILURE, UNSPECIFIED: ICD-10-CM

## 2023-04-28 DIAGNOSIS — G89.29 OTHER CHRONIC PAIN: ICD-10-CM

## 2023-04-28 DIAGNOSIS — F17.210 NICOTINE DEPENDENCE, CIGARETTES, UNCOMPLICATED: ICD-10-CM

## 2023-04-28 DIAGNOSIS — M46.26 OSTEOMYELITIS OF VERTEBRA, LUMBAR REGION: ICD-10-CM

## 2023-04-28 DIAGNOSIS — Z86.16 PERSONAL HISTORY OF COVID-19: ICD-10-CM

## 2023-04-28 DIAGNOSIS — Z91.81 HISTORY OF FALLING: ICD-10-CM

## 2023-05-13 LAB
CULTURE RESULTS: NO GROWTH — SIGNIFICANT CHANGE UP
SPECIMEN SOURCE: SIGNIFICANT CHANGE UP

## 2023-05-16 ENCOUNTER — APPOINTMENT (OUTPATIENT)
Dept: UROLOGY | Facility: CLINIC | Age: 63
End: 2023-05-16
Payer: MEDICARE

## 2023-05-16 VITALS
TEMPERATURE: 97.3 F | HEART RATE: 57 BPM | WEIGHT: 168 LBS | BODY MASS INDEX: 23.52 KG/M2 | SYSTOLIC BLOOD PRESSURE: 148 MMHG | OXYGEN SATURATION: 100 % | DIASTOLIC BLOOD PRESSURE: 77 MMHG | HEIGHT: 71 IN

## 2023-05-16 DIAGNOSIS — Z78.9 OTHER SPECIFIED HEALTH STATUS: ICD-10-CM

## 2023-05-16 DIAGNOSIS — Z84.1 FAMILY HISTORY OF DISORDERS OF KIDNEY AND URETER: ICD-10-CM

## 2023-05-16 DIAGNOSIS — U07.1 COVID-19: ICD-10-CM

## 2023-05-16 PROCEDURE — 99204 OFFICE O/P NEW MOD 45 MIN: CPT

## 2023-05-17 NOTE — HISTORY OF PRESENT ILLNESS
[FreeTextEntry1] : Name REJI BLAKE \par MRN [id number] \par  Aug 13 1960 \par ------------------------------------------------------------------------------------------------------------------------------------------- \par Date of First Visit: 23\par Referring Provider/PCP: [facility] / ******** \par ------------------------------------------------------------------------------------------------------------------------------------------- \par CC: Hydronephrosis\par \par History of Present Illness: REJI BLAKE is a 62 year male who presents for evaluation for left hydronephrosis. Was recently admitted to [facility] with head injury and lower back pain after sustaining a fall earlier from drinking too much alcohol. Underwent a CT lumbar/spine on 23, the results severe left-sided hydronephrosis with thinning of the renal parenchyma. Appearance suggests a chronic UPJ obstruction. Cr 1.92 upon discharge (1.7-2.1 during hospitalization). Unknown baseline renal function. While admitted to the hospital, was treated for chronic osteomyelitis while hospitalized s/p IR bone biopsy of L4-L5 OM/discitis 23. \par \par Patient states he had a ureteral stent in  and it was removed after a few months, though he is unsure why he had the stent or where it was inserted.\par \par He has no urinary symptoms at baseline.  No urinary frequency, urgency, dysuria, sensation of incomplete emptying, nocturia, hematuria.\par \par PVR 0 cc\par  \par \par RBUS 23: The prostate measures 3.3 x 3.0 x 4.0 cm, with volume of 21 mL. IMPRESSION: Severe left hydronephrosis.  Left ureteral jet not definitively visualized on this examination. Increased echogenicity of the kidneys bilaterally, consistent with medical renal disease.\par \par CT LUMBAR SPINE from 2023. IMPRESSION: 1.  No acute fracture. 2.   Sclerotic endplate changes at L4-L5 with marked erosive changes. Could represent chronic sequelae from advanced degenerative disease or remote\par discitis, but can not exclude acute discitis osteomyelitis based on this imaging appearance alone. Correlation with prior imaging and medical history is recommended. Consider further evaluation with MRI lumbar spine with and \par without contrast, if clinically indicated. 3.   Innumerable subtle lytic lesions seen throughout the bones. Could\par represent chronic changes from osteoporosis or multiple myeloma.4.   Severe left-sided hydronephrosis with thinning of the renal parenchyma. Appearance suggests a chronic UPJ obstruction. Correlation with prior imaging\par is recommended.

## 2023-05-17 NOTE — ASSESSMENT
[FreeTextEntry1] : Assessment:  \par REJI BLAKE is a 62 year M who presents with severe left hydronephrosis noted on recent CT scan with unknown baseline kidney function. Cr 1.92 upon discharge, 1.7-2.1 during hospitalization.\par \par Plan:\par -NM Renal/Lasix scan\par -Referral to nephrology\par -Follow up after scan

## 2023-05-18 ENCOUNTER — NON-APPOINTMENT (OUTPATIENT)
Age: 63
End: 2023-05-18

## 2023-05-20 LAB
CULTURE RESULTS: SIGNIFICANT CHANGE UP
SPECIMEN SOURCE: SIGNIFICANT CHANGE UP

## 2023-06-02 ENCOUNTER — APPOINTMENT (OUTPATIENT)
Dept: INFECTIOUS DISEASE | Facility: CLINIC | Age: 63
End: 2023-06-02

## 2023-06-07 LAB
CULTURE RESULTS: SIGNIFICANT CHANGE UP
SPECIMEN SOURCE: SIGNIFICANT CHANGE UP

## 2023-06-23 ENCOUNTER — APPOINTMENT (OUTPATIENT)
Dept: UROLOGY | Facility: CLINIC | Age: 63
End: 2023-06-23
Payer: MEDICARE

## 2023-06-23 PROCEDURE — 99215 OFFICE O/P EST HI 40 MIN: CPT

## 2023-06-23 NOTE — HISTORY OF PRESENT ILLNESS
[FreeTextEntry1] : Name REJI BLAKE \par MRN [id number] \par  Aug 13 1960 \par ------------------------------------------------------------------------------------------------------------------------------------------- \par Date of First Visit: 23\par Referring Provider/PCP: [facility] / ******** \par ------------------------------------------------------------------------------------------------------------------------------------------- \par CC: Hydronephrosis\par \par History of Present Illness: REJI BLAKE is a 62 year male who presents for evaluation for left hydronephrosis. Was recently admitted to [facility] with head injury and lower back pain after sustaining a fall earlier from drinking too much alcohol. Underwent a CT lumbar/spine on 23, the results severe left-sided hydronephrosis with thinning of the renal parenchyma. Appearance suggests a chronic UPJ obstruction. Cr 1.92 upon discharge (1.7-2.1 during hospitalization). Unknown baseline renal function. While admitted to the hospital, was treated for chronic osteomyelitis while hospitalized s/p IR bone biopsy of L4-L5 OM/discitis 23. \par \par Patient states he had a ureteral stent in  and it was removed after a few months, though he is unsure why he had the stent or where it was inserted.\par \par He has no urinary symptoms at baseline. No urinary frequency, urgency, dysuria, sensation of incomplete emptying, nocturia, hematuria.\par \par PVR 0 cc\par  \par \par RBUS 23: The prostate measures 3.3 x 3.0 x 4.0 cm, with volume of 21 mL. IMPRESSION: Severe left hydronephrosis. Left ureteral jet not definitively visualized on this examination. Increased echogenicity of the kidneys bilaterally, consistent with medical renal disease.\par \par CT LUMBAR SPINE from 2023. IMPRESSION: 1. No acute fracture. 2. Sclerotic endplate changes at L4-L5 with marked erosive changes. Could represent chronic sequelae from advanced degenerative disease or remote\par discitis, but can not exclude acute discitis osteomyelitis based on this imaging appearance alone. Correlation with prior imaging and medical history is recommended. Consider further evaluation with MRI lumbar spine with and \par without contrast, if clinically indicated. 3. Innumerable subtle lytic lesions seen throughout the bones. Could\par represent chronic changes from osteoporosis or multiple myeloma.4. Severe left-sided hydronephrosis with thinning of the renal parenchyma. Appearance suggests a chronic UPJ obstruction. Correlation with prior imaging\par is recommended. \par ------------------------------------------------------------------------------------------------------------------------------------------- \par Interval History (2023): Here for follow up for hydronephrosis and to discuss renal scan results which demonstrate obstruction of the left kidney at the level of the UPJ with 28% ipsilateral function. Has CKD with eGFR ~30 - upcoming consultation with Dr. Aldana of nephrology.\par \par NUCLEAR MEDICINE RENAL SCAN WITH LASIX from 2023. IMPRESSION: 1. Minimally delayed arterial perfusion and functioning of the right kidney, without evidence of right-sided obstruction. 2. Minimal functioning of the obstructed left kidney with quantitative split function measuring 28% left/72% right.

## 2023-06-23 NOTE — PHYSICAL EXAM
[General Appearance - Well Developed] : well developed [General Appearance - Well Nourished] : well nourished [Normal Appearance] : normal appearance [Well Groomed] : well groomed [General Appearance - In No Acute Distress] : no acute distress [Edema] : no peripheral edema [] : no respiratory distress [Respiration, Rhythm And Depth] : normal respiratory rhythm and effort [Exaggerated Use Of Accessory Muscles For Inspiration] : no accessory muscle use [Oriented To Time, Place, And Person] : oriented to person, place, and time [Affect] : the affect was normal [Mood] : the mood was normal [Normal Station and Gait] : the gait and station were normal for the patient's age [Not Anxious] : not anxious [No Focal Deficits] : no focal deficits

## 2023-06-23 NOTE — ASSESSMENT
[FreeTextEntry1] : Assessment: \par REJI BLAKE is a 62 year M who presents with severe left hydronephrosis noted on recent CT scan with unknown baseline kidney function. Cr 1.92 upon discharge, 1.7-2.1 during hospitalization.  Recent renal scan demonstrates minimal functioning of the obstructed left kidney with quantitative split function measuring 28% left/72% right.\par \par We discussed the significance of ureteral and UPJ obstruction, particularly in the context of his poor baseline renal function and impairments of the affected kidney.  Treatment options were discussed in detail including chronic stent exchanges, robotic assisted laparoscopic pyeloplasty, and endoscopic treatment with balloon dilation and or endopyelotomy.  It was explained that a better characterization of his abdominal and renal anatomy is necessary to best inform him for treatment decision making.  Importantly, we will want to rule out a crossing vessel as the cause of his obstruction as this would obviate endoscopic management.  Unfortunately, his poor baseline renal function limits our ability to obtain a CT angio and therefore we will have to rely on a noncontrast study to make this determination.  I further explained that he may require drainage with a nephrostomy tube and/or possible diagnostic endoscopic procedure \par \par \par Plan:\par -CT abd/pelvis to better evaluate anatomy and rule out crossing vessel (contrast study is contraindicated by renal function)\par -Follow up and next steps to be discussed after imaging \par -Currently scheduled for nephrology evaluation by Dr. Aldana next week\par \par \par

## 2023-06-29 ENCOUNTER — APPOINTMENT (OUTPATIENT)
Dept: NEPHROLOGY | Facility: CLINIC | Age: 63
End: 2023-06-29
Payer: MEDICARE

## 2023-06-29 DIAGNOSIS — M19.90 UNSPECIFIED OSTEOARTHRITIS, UNSPECIFIED SITE: ICD-10-CM

## 2023-06-29 PROCEDURE — 99205 OFFICE O/P NEW HI 60 MIN: CPT

## 2023-06-30 ENCOUNTER — APPOINTMENT (OUTPATIENT)
Dept: INFECTIOUS DISEASE | Facility: CLINIC | Age: 63
End: 2023-06-30
Payer: MEDICARE

## 2023-06-30 VITALS
WEIGHT: 155 LBS | HEIGHT: 71 IN | HEART RATE: 77 BPM | TEMPERATURE: 97.8 F | RESPIRATION RATE: 14 BRPM | DIASTOLIC BLOOD PRESSURE: 82 MMHG | OXYGEN SATURATION: 98 % | SYSTOLIC BLOOD PRESSURE: 119 MMHG | BODY MASS INDEX: 21.7 KG/M2

## 2023-06-30 PROCEDURE — 36415 COLL VENOUS BLD VENIPUNCTURE: CPT

## 2023-06-30 PROCEDURE — 99214 OFFICE O/P EST MOD 30 MIN: CPT | Mod: 25

## 2023-06-30 NOTE — HISTORY OF PRESENT ILLNESS
[FreeTextEntry1] : 62 year old M h/o COVID 2020 with prolonged illness, chronic LBP since ~2010, admitted 4/14 s/p fall with head injury and LBP.  MRI w/wo from 4/15 showed findings suggestive of OM/discitis at L4-L5 with ventral enhancing epidural ST compressing thecal sac as well as additional predominantly paravertebral enhancing ST (L>R) without discrete fluid collection.  Additional discitis/OM at L5-S1 vs. degenerative changes.  PET scan c/c focus of OM/discitis L4-L5. He had bone biopsy on 4/21. Cultures negative. He refused MOSHE so was discharged off antibiotics. Back pain is significant but unchanged. He sees pain management but feels he is not getting enough relief. He has received 6 injections on his L side with improvement (unknown med) and is due to have the same on the right. No fever/chills.

## 2023-06-30 NOTE — REVIEW OF SYSTEMS
[As Noted in HPI] : as noted in HPI [Fever] : no fever [Chills] : no chills [Eye Pain] : no eye pain [Eyesight Problems] : no eyesight problems [Nasal Discharge] : no nasal discharge [Sore Throat] : no sore throat [Chest Pain] : no chest pain [Shortness Of Breath] : no shortness of breath [Abdominal Pain] : no abdominal pain [Vomiting] : no vomiting [Dysuria] : no dysuria

## 2023-06-30 NOTE — PHYSICAL EXAM
[General Appearance - Alert] : alert [General Appearance - In No Acute Distress] : in no acute distress [Sclera] : the sclera and conjunctiva were normal [Outer Ear] : the ears and nose were normal in appearance [Examination Of The Oral Cavity] : the lips and gums were normal [Oropharynx] : the oropharynx was normal with no thrush [Auscultation Breath Sounds / Voice Sounds] : lungs were clear to auscultation bilaterally [Heart Rate And Rhythm] : heart rate was normal and rhythm regular [Heart Sounds] : normal S1 and S2 [Murmurs] : no murmurs [Edema] : there was no peripheral edema [Bowel Sounds] : normal bowel sounds [Abdomen Soft] : soft [Abdomen Tenderness] : non-tender [Costovertebral Angle Tenderness] : no CVA tenderness [No Palpable Adenopathy] : no palpable adenopathy [Musculoskeletal - Swelling] : no joint swelling [] : no rash [FreeTextEntry1] : Back:  No vertebral/paravertebral tenderness to palpation

## 2023-06-30 NOTE — END OF VISIT
[Time Spent: ___ minutes] : I have spent [unfilled] minutes of time on the encounter. [FreeTextEntry3] : I attest that I was present for 100% of the visit.

## 2023-06-30 NOTE — ASSESSMENT
[FreeTextEntry1] : 62 year old M h/o COVID 2020 with prolonged illness, chronic LBP since ~2010, admitted 4/14 s/p fall with head injury and LBP.  Imaging consistent with OM/discitis L4-L5. He had bone biopsy on 4/21. Bacterial, fungal and AFB cultures negative. Back pain is unchanged. If infection and not degenerative, would expect progression on imaging.\par Plan:\par - Monitor off antibiotics\par - CBC, CMP, ESR, CRP drawn and sent from office\par - Repeat MR lumbar spine w/wo IV contrast\par Will contact patient with results and determine f/u.

## 2023-07-04 VITALS
DIASTOLIC BLOOD PRESSURE: 80 MMHG | OXYGEN SATURATION: 99 % | HEART RATE: 78 BPM | RESPIRATION RATE: 12 BRPM | SYSTOLIC BLOOD PRESSURE: 118 MMHG

## 2023-07-04 NOTE — ASSESSMENT
[FreeTextEntry1] : Patient is a 63 yo M with CHITO on ? CKD, L sided hydronephrosis, osteomyelitis, COPD current smoker, hypothyroidism, polyneuropathy, presenting to establish care for CHITO on CKD\par \par CHITO on CKD - CKD 2/2 obstruction, smoking, microvascular disease, would not like to have labs drawn today will have labs with ID soon. Otherwise sCr stable in ~2 range prior to d/c from hospital\par \par Hydronephrosis - discussed would definitely go for procedure for hydronephrosis. Discussed only 25% from L kidney, but given how close he is to dialysis having an increase by 5-10 GFR may be able to help stave off dialysis further\par \par Cigarette smoking - discussed at length, patient precontemplative\par \par RTC in 3-5 months

## 2023-07-04 NOTE — PHYSICAL EXAM
[General Appearance - Alert] : alert [General Appearance - In No Acute Distress] : in no acute distress [Sclera] : the sclera and conjunctiva were normal [PERRL With Normal Accommodation] : pupils were equal in size, round, and reactive to light [Extraocular Movements] : extraocular movements were intact [Outer Ear] : the ears and nose were normal in appearance [Oropharynx] : the oropharynx was normal [Respiration, Rhythm And Depth] : normal respiratory rhythm and effort [Exaggerated Use Of Accessory Muscles For Inspiration] : no accessory muscle use [Auscultation Breath Sounds / Voice Sounds] : lungs were clear to auscultation bilaterally [Heart Rate And Rhythm] : heart rate was normal and rhythm regular [Heart Sounds] : normal S1 and S2 [Heart Sounds Gallop] : no gallops [Murmurs] : no murmurs [Heart Sounds Pericardial Friction Rub] : no pericardial rub [Edema] : there was no peripheral edema [Veins - Varicosity Changes] : there were no varicosital changes [Bowel Sounds] : normal bowel sounds [Abdomen Soft] : soft [Abdomen Tenderness] : non-tender [Abdomen Mass (___ Cm)] : no abdominal mass palpated [No CVA Tenderness] : no ~M costovertebral angle tenderness [No Spinal Tenderness] : no spinal tenderness [Involuntary Movements] : no involuntary movements were seen [Skin Color & Pigmentation] : normal skin color and pigmentation [Skin Turgor] : normal skin turgor [] : no rash [Cranial Nerves] : cranial nerves 2-12 were intact [No Focal Deficits] : no focal deficits [Affect] : the affect was normal [Mood] : the mood was normal [FreeTextEntry1] : Arthritic deformity bilateral hands

## 2023-07-04 NOTE — HISTORY OF PRESENT ILLNESS
[FreeTextEntry1] : Patient is a 61 yo M with CHITO on ? CKD, L sided hydronephrosis, osteomyelitis, COPD current smoker, hypothyroidism, polyneuropathy, presenting to establish care for CHITO on CKD\par \par \par CT scan with unknown baseline kidney function. Cr 1.92 upon discharge, 1.7-2.1 during hospitalization. Recent renal scan demonstrates minimal functioning of the obstructed left kidney with quantitative split function measuring 28% left/72% right.\par \par \par \par Nephrologic History:\par Stones: None\par NSAID use: Not anymore\par HTN: Denies\par DM: Denies\par Prior nephrologist: No\par Kidney biopsy: None\par Reduced kidney mass (prematurity): Not premature or underweight\par Pre-eclampsia: Male\par Urination history: Twice a day, a lot each time\par \par Family Hx: Family hx of drug issues, mother, otherwise no kidney issues\par Surgical Hx: As recd\par Social Hx: Does still smoke, 6 cigarettes a day, used to be more than a pack a day. smoking since 21 years old. Former crack. Still occassional EtOH\par Allergies: NKDA\par Meds: Diazepam, folic acid, gabapentin 200 TID, levothyroxine 112mcg, multivita, thiamine 100, tramadol 50 TID \par \par \par COPD, hypothyroidism, polyneuropathy, folate def, thiamine def, osteoarthritis deformities\par History of severe covid intubated with pneumonia\par \par Discussed smoking at length, encouraged to quit, pre-contemplative\par \par Suggested to go for procedure with dr lyle. \par \par

## 2023-07-05 ENCOUNTER — NON-APPOINTMENT (OUTPATIENT)
Age: 63
End: 2023-07-05

## 2023-07-05 LAB
ALBUMIN SERPL ELPH-MCNC: 4.2 G/DL
ALP BLD-CCNC: 79 U/L
ALT SERPL-CCNC: 8 U/L
ANION GAP SERPL CALC-SCNC: 8 MMOL/L
AST SERPL-CCNC: 15 U/L
BILIRUB SERPL-MCNC: 0.2 MG/DL
BUN SERPL-MCNC: 26 MG/DL
CALCIUM SERPL-MCNC: 8.9 MG/DL
CHLORIDE SERPL-SCNC: 106 MMOL/L
CO2 SERPL-SCNC: 27 MMOL/L
CREAT SERPL-MCNC: 2.01 MG/DL
CRP SERPL-MCNC: <3 MG/L
EGFR: 37 ML/MIN/1.73M2
ERYTHROCYTE [SEDIMENTATION RATE] IN BLOOD BY WESTERGREN METHOD: 57 MM/HR
GLUCOSE SERPL-MCNC: 78 MG/DL
POTASSIUM SERPL-SCNC: 5.1 MMOL/L
PROT SERPL-MCNC: 7 G/DL
SODIUM SERPL-SCNC: 141 MMOL/L

## 2023-07-10 ENCOUNTER — OUTPATIENT (OUTPATIENT)
Dept: OUTPATIENT SERVICES | Facility: HOSPITAL | Age: 63
LOS: 1 days | End: 2023-07-10
Payer: MEDICARE

## 2023-07-10 ENCOUNTER — APPOINTMENT (OUTPATIENT)
Dept: CT IMAGING | Facility: HOSPITAL | Age: 63
End: 2023-07-10
Payer: MEDICARE

## 2023-07-10 DIAGNOSIS — Z78.9 OTHER SPECIFIED HEALTH STATUS: Chronic | ICD-10-CM

## 2023-07-10 PROCEDURE — 74176 CT ABD & PELVIS W/O CONTRAST: CPT

## 2023-07-10 PROCEDURE — 74176 CT ABD & PELVIS W/O CONTRAST: CPT | Mod: 26

## 2023-07-13 ENCOUNTER — EMERGENCY (EMERGENCY)
Facility: HOSPITAL | Age: 63
LOS: 1 days | Discharge: HOME | End: 2023-07-13
Attending: EMERGENCY MEDICINE | Admitting: EMERGENCY MEDICINE
Payer: MEDICARE

## 2023-07-13 VITALS
SYSTOLIC BLOOD PRESSURE: 129 MMHG | DIASTOLIC BLOOD PRESSURE: 73 MMHG | TEMPERATURE: 99 F | RESPIRATION RATE: 16 BRPM | OXYGEN SATURATION: 96 % | HEART RATE: 78 BPM

## 2023-07-13 VITALS
OXYGEN SATURATION: 97 % | RESPIRATION RATE: 18 BRPM | SYSTOLIC BLOOD PRESSURE: 134 MMHG | DIASTOLIC BLOOD PRESSURE: 83 MMHG | HEART RATE: 80 BPM | TEMPERATURE: 98 F

## 2023-07-13 DIAGNOSIS — E07.9 DISORDER OF THYROID, UNSPECIFIED: ICD-10-CM
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
## 2023-07-13 DIAGNOSIS — Z78.9 OTHER SPECIFIED HEALTH STATUS: Chronic | ICD-10-CM

## 2023-07-13 DIAGNOSIS — F10.129 ALCOHOL ABUSE WITH INTOXICATION, UNSPECIFIED: ICD-10-CM

## 2023-07-13 DIAGNOSIS — R51.9 HEADACHE, UNSPECIFIED: ICD-10-CM
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
## 2023-07-13 DIAGNOSIS — S09.90XA UNSPECIFIED INJURY OF HEAD, INITIAL ENCOUNTER: ICD-10-CM

## 2023-07-13 DIAGNOSIS — M19.90 UNSPECIFIED OSTEOARTHRITIS, UNSPECIFIED SITE: ICD-10-CM

## 2023-07-13 PROCEDURE — 99284 EMERGENCY DEPT VISIT MOD MDM: CPT

## 2023-07-13 PROCEDURE — 70450 CT HEAD/BRAIN W/O DYE: CPT | Mod: 26

## 2023-07-13 RX ORDER — THIAMINE MONONITRATE (VIT B1) 100 MG
100 TABLET ORAL ONCE
Refills: 0 | Status: COMPLETED | OUTPATIENT
Start: 2023-07-13 | End: 2023-07-13

## 2023-07-13 RX ORDER — FOLIC ACID 0.8 MG
1 TABLET ORAL ONCE
Refills: 0 | Status: COMPLETED | OUTPATIENT
Start: 2023-07-13 | End: 2023-07-13

## 2023-07-13 RX ORDER — TRAMADOL HYDROCHLORIDE 50 MG/1
50 TABLET ORAL ONCE
Refills: 0 | Status: DISCONTINUED | OUTPATIENT
Start: 2023-07-13 | End: 2023-07-13

## 2023-07-13 RX ORDER — LEVOTHYROXINE SODIUM 125 MCG
125 TABLET ORAL ONCE
Refills: 0 | Status: COMPLETED | OUTPATIENT
Start: 2023-07-13 | End: 2023-07-13

## 2023-07-13 RX ORDER — GABAPENTIN 400 MG/1
200 CAPSULE ORAL ONCE
Refills: 0 | Status: COMPLETED | OUTPATIENT
Start: 2023-07-13 | End: 2023-07-13

## 2023-07-13 RX ADMIN — Medication 125 MICROGRAM(S): at 12:45

## 2023-07-13 RX ADMIN — TRAMADOL HYDROCHLORIDE 50 MILLIGRAM(S): 50 TABLET ORAL at 11:45

## 2023-07-13 RX ADMIN — GABAPENTIN 200 MILLIGRAM(S): 400 CAPSULE ORAL at 12:44

## 2023-07-13 RX ADMIN — Medication 1 MILLIGRAM(S): at 12:45

## 2023-07-13 RX ADMIN — Medication 100 MILLIGRAM(S): at 12:45

## 2023-07-13 NOTE — ED PROVIDER NOTE - NS ED MD DISPO DISCHARGE CCDA
elevated IOP OU discussed with patient compliance of using drops.  Has not used OAG gtts x several days OU. Patient/Caregiver provided printed discharge information.

## 2023-07-13 NOTE — ED PROVIDER NOTE - CLINICAL SUMMARY MEDICAL DECISION MAKING FREE TEXT BOX
Patient with headache, possible head injury after drinking alcohol. Will get CT head. Will also give patient his regular medications as he has not had any since yesterday.

## 2023-07-13 NOTE — ED PROVIDER NOTE - OBJECTIVE STATEMENT
Patient reports he is from assisted living. went out drinking with friends last night. Woke up in ED. Does not recall what happened. c/o headache and thinks he may have hit his head. Patient also has chronic back pain and has not had his tramadol today. Back pain is at baseline. No fever, cp, sob, ap, n/v/d, focal weakness, paresthesias, incontinence, SI/HI.

## 2023-07-13 NOTE — ED PROVIDER NOTE - PATIENT PORTAL LINK FT
You can access the FollowMyHealth Patient Portal offered by [facility] [place] by registering at the following website: [email or web address]. By joining [facility]’s FollowMyHealth portal, you will also be able to view your health information using other applications (apps) compatible with our system.

## 2023-07-13 NOTE — ED ADULT TRIAGE NOTE - CHIEF COMPLAINT QUOTE
Pt BIBEMS from street complaining of back pain. Pt with chronic back pain denies recent fall. Pt admits to drinking alcohol and using marijuana today. Pt denies numbness/tingling. Denies bladder/bowel incontinence.

## 2023-07-13 NOTE — ED PROVIDER NOTE - NSFOLLOWUPINSTRUCTIONS_ED_ALL_ED_FT
Head Injury    WHAT YOU NEED TO KNOW:    A head injury can include your scalp, face, skull, or brain and range from mild to severe. Effects can appear immediately after the injury or develop later. The effects may last a short time or be permanent. Healthcare providers may want to check your recovery over time. Treatment may change as you recover or develop new health problems from the head injury.    DISCHARGE INSTRUCTIONS:    Call your local emergency number (911 in the [place]), or have someone else call if:   •You cannot be woken.      •You have a seizure.      •You stop responding to others or you faint.      •You have blurry or double vision.      •Your speech becomes slurred or confused.      •You have arm or leg weakness, loss of feeling, or new problems with coordination.      •Your pupils are larger than usual, or one pupil is a different size than the other.      •You have blood or clear fluid coming out of your ears or nose.      Return to the emergency department if:   •You have repeated or forceful vomiting.      •You feel confused.      •Your headache gets worse or becomes severe.      •You or someone caring for you notices that you are harder to wake than usual.      Call your doctor if:   •Your symptoms last longer than 6 weeks after the injury.      •You have questions or concerns about your condition or care.      Medicines:   •Acetaminophen decreases pain and fever. It is available without a doctor's order. Ask how much to take and how often to take it. Follow directions. Read the labels of all other medicines you are using to see if they also contain acetaminophen, or ask your doctor or pharmacist. Acetaminophen can cause liver damage if not taken correctly.      •Take your medicine as directed. Contact your healthcare provider if you think your medicine is not helping or if you have side effects. Tell your provider if you are allergic to any medicine. Keep a list of the medicines, vitamins, and herbs you take. Include the amounts, and when and why you take them. Bring the list or the pill bottles to follow-up visits. Carry your medicine list with you in case of an emergency.      Self-care:   •Rest or do quiet activities. Limit your time watching TV, using the computer, or doing tasks that require a lot of thinking. Slowly return to your normal activities as directed. Do not play sports or do activities that may cause you to get hit in the head. Ask your healthcare provider when you can return to sports.      •Apply ice on your head for 15 to 20 minutes every hour or as directed. Use an ice pack, or put crushed ice in a plastic bag. Cover it with a towel before you apply it to your skin. Ice helps prevent tissue damage and decreases swelling and pain.      •Have someone stay with you for 24 hours , or as directed. This person can monitor you for problems and call for help if needed. When you are awake, the person should ask you a few questions every few hours to see if you are thinking clearly. An example is to ask your name or address.      Prevent another head injury:   •Wear a helmet that fits properly. Do this when you play sports, or ride a bike, scooter, or skateboard. Helmets help decrease your risk for a serious head injury. Talk to your healthcare provider about other ways you can protect yourself if you play sports.      •Wear your seatbelt every time you are in a car. This helps lower your risk for a head injury if you are in a car accident.      Follow up with your doctor as directed: Write down your questions so you remember to ask them during your visits.    Alcohol Intoxication  Alcohol intoxication occurs when a person no longer thinks clearly or functions well (becomes impaired) after drinking alcohol. Intoxication can occur with even one drink. The level of impairment depends on:    The amount of alcohol the person had.  The person's age, gender, and weight.  How often the person drinks.  Whether the person has other medical conditions, such as diabetes, seizures, or a heart condition.    Alcohol intoxication can range in severity from mild to severe. The condition can be dangerous, especially when caused by drinking large amounts of alcohol in a short period of time (binge drinking) or if the person also took certain prescription medicines or recreational drugs.    What are the signs or symptoms?  Symptoms of mild alcohol intoxication include:    Feeling relaxed or sleepy.  Mild difficulty with:    Coordination.  Speech.  Memory.  Attention.      Symptoms of moderate alcohol intoxication include:    Extreme emotions, such as anger or sadness.  Moderate difficulty with:    Coordination.  Speech.  Memory.  Attention.      Symptoms of severe alcohol intoxication include:    Passing out.  Vomiting.  Confusion.  Slow breathing.  Coma.  Severe difficulty with:    Coordination.  Speech.  Memory.  Attention.      Intoxication, especially in people who are not exposed to alcohol often can progress from mild to severe quickly, and may even cause coma or death.    How is this diagnosed?  This condition may be diagnosed based on:    A medical history.  A physical exam.  A blood test that measures the concentration of alcohol in the blood (blood alcohol content, or ARGENIS).  Whether there is a smell of alcohol on the breath.    Your health care provider will ask you how much alcohol you drank and what kind of alcohol you had.    How is this treated?  Usually, treatment is not needed for this condition. Most of the effects of alcohol are temporary and go away as the alcohol naturally leaves the body. Your health care provider may recommend monitoring until the alcohol level starts to drop and it is safe to go home. You may also get fluids through an IV tube to help prevent dehydration. If the intoxication is severe, a breathing machine called a ventilator may be needed to support your breathing.    Follow these instructions at home:  Do not drive after drinking alcohol.  Have someone stay with you while you are intoxicated. You should not be left alone.  Stay hydrated. Drink enough fluid to keep your urine clear or pale yellow.  Avoid caffeine because it can dehydrate you.  ImageTake over-the-counter and prescription medicines only as told by your health care provider.  How is this prevented?  To prevent alcohol intoxication:    Limit alcohol intake to no more than 1 drink a day for nonpregnant women and 2 drinks a day for men. One drink equals 12 oz of beer, 5 oz of wine, or 1½ oz of hard liquor.  Do not drink alcohol on an empty stomach.  Avoid drinking alcohol if:    You are under the legal drinking age.  You are pregnant or may be pregnant.  You are taking medicines that should not be taken with alcohol.  Your drinking causes your medical condition to get worse.  You need to drive or perform activities that require your attention.  You have substance use disorder.      To prevent potentially serious complications of alcohol intoxication, seek immediate medical care if you or someone you know has signs of moderate or severe alcohol intoxication. These include:    Moderate or severe difficulty with:    Coordination.  Speech.  Memory.  Attention.    Passing out.  Confusion.  Vomiting.    Do not leave someone alone if he or she is intoxicated.    Contact a health care provider if:  You do not feel better after a few days.  You are having problems at work, at school, or at home due to drinking.  Get help right away if:  You become shaky when you try to stop drinking.  You shake uncontrollably (have a seizure).  You vomit blood. Blood in vomit may look bright red, or it may look like coffee grounds.  You have blood in your stool. Blood in stool may be bright red, or it may make stool appear black and tarry and make it smell bad.  You become light-headed or you faint.  If you ever feel like you may hurt yourself or others, or have thoughts about taking your own life, get help right away. You can go to your nearest emergency department or call:     Your local emergency services (911 in the [place]   A suicide crisis helpline, such as the National Suicide Prevention Lifeline at 1-388.334.5070. This is open 24 hours a day.     This information is not intended to replace advice given to you by your health care provider. Make sure you discuss any questions you have with your health care provider.

## 2023-07-13 NOTE — ED PROVIDER NOTE - PROGRESS NOTE DETAILS
Spoke with patient's  Kalyn Bower [phone]. She emailed [facility] paperwork. Reports patient often ends up in ED after drinking alcohol. Asked to have patient's discharge papers faxed to her when ready, as patient often throws out discharge papers before returning to the facility. fax [phone] Patient is resting comfortably, NAD. CT head negative. Will return to [facility] via [facility]

## 2023-07-18 ENCOUNTER — APPOINTMENT (OUTPATIENT)
Dept: UROLOGY | Facility: CLINIC | Age: 63
End: 2023-07-18

## 2023-07-25 ENCOUNTER — APPOINTMENT (OUTPATIENT)
Dept: UROLOGY | Facility: CLINIC | Age: 63
End: 2023-07-25

## 2023-07-27 ENCOUNTER — APPOINTMENT (OUTPATIENT)
Dept: MRI IMAGING | Facility: HOSPITAL | Age: 63
End: 2023-07-27

## 2023-07-27 ENCOUNTER — OUTPATIENT (OUTPATIENT)
Dept: OUTPATIENT SERVICES | Facility: HOSPITAL | Age: 63
LOS: 1 days | End: 2023-07-27
Payer: MEDICARE

## 2023-07-27 DIAGNOSIS — Z78.9 OTHER SPECIFIED HEALTH STATUS: Chronic | ICD-10-CM

## 2023-07-27 PROCEDURE — 72158 MRI LUMBAR SPINE W/O & W/DYE: CPT

## 2023-07-27 PROCEDURE — 72158 MRI LUMBAR SPINE W/O & W/DYE: CPT | Mod: 26

## 2023-08-01 ENCOUNTER — APPOINTMENT (OUTPATIENT)
Dept: UROLOGY | Facility: CLINIC | Age: 63
End: 2023-08-01
Payer: MEDICARE

## 2023-08-01 VITALS
DIASTOLIC BLOOD PRESSURE: 71 MMHG | TEMPERATURE: 98.7 F | SYSTOLIC BLOOD PRESSURE: 103 MMHG | HEART RATE: 82 BPM | OXYGEN SATURATION: 99 %

## 2023-08-01 PROCEDURE — 99214 OFFICE O/P EST MOD 30 MIN: CPT

## 2023-08-02 ENCOUNTER — NON-APPOINTMENT (OUTPATIENT)
Age: 63
End: 2023-08-02

## 2023-08-02 LAB
ANION GAP SERPL CALC-SCNC: 12 MMOL/L
APPEARANCE: CLEAR
APTT BLD: 34.5 SEC
BACTERIA: NEGATIVE /HPF
BILIRUBIN URINE: NEGATIVE
BLOOD URINE: NEGATIVE
BUN SERPL-MCNC: 28 MG/DL
CALCIUM SERPL-MCNC: 9.2 MG/DL
CAST: 0 /LPF
CHLORIDE SERPL-SCNC: 102 MMOL/L
CO2 SERPL-SCNC: 26 MMOL/L
COLOR: YELLOW
CREAT SERPL-MCNC: 1.62 MG/DL
EGFR: 48 ML/MIN/1.73M2
EPITHELIAL CELLS: 0 /HPF
GLUCOSE QUALITATIVE U: NEGATIVE MG/DL
GLUCOSE SERPL-MCNC: 88 MG/DL
INR PPP: 1.04 RATIO
KETONES URINE: NEGATIVE MG/DL
LEUKOCYTE ESTERASE URINE: NEGATIVE
MICROSCOPIC-UA: NORMAL
NITRITE URINE: NEGATIVE
PH URINE: 5.5
POTASSIUM SERPL-SCNC: 5.2 MMOL/L
PROTEIN URINE: 30 MG/DL
PT BLD: 11.7 SEC
RED BLOOD CELLS URINE: 0 /HPF
SODIUM SERPL-SCNC: 140 MMOL/L
SPECIFIC GRAVITY URINE: 1.02
UROBILINOGEN URINE: 0.2 MG/DL
WHITE BLOOD CELLS URINE: 0 /HPF

## 2023-08-02 NOTE — ASSESSMENT
[FreeTextEntry1] : REJI BLAKE is a 62 year M who presents with severe left hydronephrosis noted on recent CT scan with unknown baseline kidney function (1.7-2.1 during hospitalization). Recent renal scan demonstrates impaired functioning of the obstructed left kidney with quantitative split function measuring 28% left/72% right.  I personally reviewed the patient's renal scan and CT scan and discussed the findings. We further discussed the significance of ureteral obstruction, particularly in the context of his poor baseline renal function and impairments of the affected kidney. Treatment options were discussed in detail including chronic stent exchanges, robotic assisted laparoscopic pyeloplasty, and endoscopic treatment with balloon dilation and or endopyelotomy.   Plan: -Schedule for left ureteroscopy with endopyelotomy on 8/14/23 -Preop labs, UA/UCx, medical clearance requested    Jose Manuel Jiménez MD Director, Endourology and [facility] at [facility]  of Urology [facility] [employer] at [facility]

## 2023-08-02 NOTE — HISTORY OF PRESENT ILLNESS
[FreeTextEntry1] : Name REJI BLAKE MRN [id number]  Aug 13 1960 ------------------------------------------------------------------------------------------------------------------------------------------- Date of First Visit: 23 Referring Provider/PCP: [facility] / ******** ------------------------------------------------------------------------------------------------------------------------------------------- CC: Hydronephrosis  History of Present Illness: ERJI BLAKE is a 62 year male who presents for evaluation for left hydronephrosis. Was recently admitted to [facility] with head injury and lower back pain after sustaining a fall earlier from drinking too much alcohol. Underwent a CT lumbar/spine on 23, the results severe left-sided hydronephrosis with thinning of the renal parenchyma. Appearance suggests a chronic UPJ obstruction. Cr 1.92 upon discharge (1.7-2.1 during hospitalization). Unknown baseline renal function. While admitted to the hospital, was treated for chronic osteomyelitis while hospitalized s/p IR bone biopsy of L4-L5 OM/discitis 23.  Patient states he had a ureteral stent in  and it was removed after a few months, though he is unsure why he had the stent or where it was inserted.  He has no urinary symptoms at baseline. No urinary frequency, urgency, dysuria, sensation of incomplete emptying, nocturia, hematuria.  PVR 0 cc  RBUS 23: The prostate measures 3.3 x 3.0 x 4.0 cm, with volume of 21 mL. IMPRESSION: Severe left hydronephrosis. Left ureteral jet not definitively visualized on this examination. Increased echogenicity of the kidneys bilaterally, consistent with medical renal disease. CT LUMBAR SPINE from 2023. IMPRESSION: 1. No acute fracture. 2. Sclerotic endplate changes at L4-L5 with marked erosive changes. Could represent chronic sequelae from advanced degenerative disease or remote discitis, but can not exclude acute discitis osteomyelitis based on this imaging appearance alone. Correlation with prior imaging and medical history is recommended. Consider further evaluation with MRI lumbar spine with and without contrast, if clinically indicated. 3. Innumerable subtle lytic lesions seen throughout the bones. Could represent chronic changes from osteoporosis or multiple myeloma.4. Severe left-sided hydronephrosis with thinning of the renal parenchyma. Appearance suggests a chronic UPJ obstruction. Correlation with prior imaging is recommended. ------------------------------------------------------------------------------------------------------------------------------------------- Interval History (2023): Here for follow up for hydronephrosis and to discuss renal scan results which demonstrate obstruction of the left kidney at the level of the UPJ with 28% ipsilateral function. Has CKD with eGFR ~30 - upcoming consultation with Dr. Aldana of nephrology.  NUCLEAR MEDICINE RENAL SCAN WITH LASIX from 2023. IMPRESSION: 1. Minimally delayed arterial perfusion and functioning of the right kidney, without evidence of right-sided obstruction. 2. Minimal functioning of the obstructed left kidney with quantitative split function measuring 28% left/72% right. ------------------------------------------------------------------------------------------------------------------------------------------- Interval History (2023): We discussed findings of MAG3 and recent CT. Transition point is lower than UPJ, with unclear length of obstruction. Study also limited by lack of contrast (CKD). Patient recently saw Dr. Aldana for co-management of CKD. Cr 2.01/eGFR 37

## 2023-08-03 LAB — BACTERIA UR CULT: NORMAL

## 2023-08-14 ENCOUNTER — APPOINTMENT (OUTPATIENT)
Dept: UROLOGY | Facility: AMBULATORY SURGERY CENTER | Age: 63
End: 2023-08-14

## 2023-08-17 ENCOUNTER — NON-APPOINTMENT (OUTPATIENT)
Age: 63
End: 2023-08-17

## 2023-08-18 NOTE — ASU PATIENT PROFILE, ADULT - FALL HARM RISK - HARM RISK INTERVENTIONS
Assistance with ambulation/Assistance OOB with selected safe patient handling equipment/Communicate Risk of Fall with Harm to all staff/Discuss with provider need for PT consult/Monitor gait and stability/Provide patient with walking aids - walker, cane, crutches/Reinforce activity limits and safety measures with patient and family/Sit up slowly, dangle for a short time, stand at bedside before walking/Tailored Fall Risk Interventions/Use of alarms - bed, chair and/or voice tab/Visual Cue: Yellow wristband and red socks/Bed in lowest position, wheels locked, appropriate side rails in place/Call bell, personal items and telephone in reach/Instruct patient to call for assistance before getting out of bed or chair/Non-slip footwear when patient is out of bed/[place] to call system/Physically safe environment - no spills, clutter or unnecessary equipment/Purposeful Proactive Rounding/Room/bathroom lighting operational, light cord in reach Assistance with ambulation/Assistance OOB with selected safe patient handling equipment/Communicate Risk of Fall with Harm to all staff/Discuss with provider need for PT consult/Monitor gait and stability/Provide patient with walking aids - walker, cane, crutches/Reinforce activity limits and safety measures with patient and family/Tailored Fall Risk Interventions/Visual Cue: Yellow wristband and red socks/Bed in lowest position, wheels locked, appropriate side rails in place/Call bell, personal items and telephone in reach/Instruct patient to call for assistance before getting out of bed or chair/Non-slip footwear when patient is out of bed/[place] to call system/Physically safe environment - no spills, clutter or unnecessary equipment/Purposeful Proactive Rounding/Room/bathroom lighting operational, light cord in reach

## 2023-08-18 NOTE — ASU PATIENT PROFILE, ADULT - REASON FOR ADMISSION, PROFILE
Left uteroscopy w/endopyelotomy LEFT Combined Antegrade and Retrograde Ureteroscopy, Possible Endopyelotomy and Ureteral Stent Insertion

## 2023-08-18 NOTE — ASU PATIENT PROFILE, ADULT - NSICDXPASTMEDICALHX_GEN_ALL_CORE_FT
PAST MEDICAL HISTORY:  2019 novel coronavirus disease (COVID-19) w/intubation    Arthritis     Chronic back pain     DJD (degenerative joint disease)     Thyroid disease      PAST MEDICAL HISTORY:  2019 novel coronavirus disease (COVID-19) w/intubation    Acute embolism and thrombosis of deep vein of lower extremity     Arthritis     Chronic back pain     DJD (degenerative joint disease)     H/O: hypothyroidism     Thyroid disease      PAST MEDICAL HISTORY:  2019 novel coronavirus disease (COVID-19) w/intubation    Acute embolism and thrombosis of deep vein of lower extremity     Arthritis     Chronic back pain     Chronic obstructive pulmonary disease (COPD)     DJD (degenerative joint disease)     H/O: hypothyroidism     HTN (hypertension)     Thyroid disease

## 2023-08-20 ENCOUNTER — TRANSCRIPTION ENCOUNTER (OUTPATIENT)
Age: 63
End: 2023-08-20

## 2023-08-20 ENCOUNTER — INPATIENT (INPATIENT)
Facility: HOSPITAL | Age: 63
LOS: 12 days | Discharge: NURSING, REHAB OR LONG-TERM CARE | DRG: 694 | End: 2023-09-02
Attending: STUDENT IN AN ORGANIZED HEALTH CARE EDUCATION/TRAINING PROGRAM | Admitting: HOSPITALIST
Payer: MEDICARE

## 2023-08-20 VITALS
HEART RATE: 78 BPM | RESPIRATION RATE: 15 BRPM | TEMPERATURE: 99 F | DIASTOLIC BLOOD PRESSURE: 89 MMHG | WEIGHT: 187.39 LBS | SYSTOLIC BLOOD PRESSURE: 137 MMHG | HEIGHT: 70 IN | OXYGEN SATURATION: 98 %

## 2023-08-20 DIAGNOSIS — Z98.890 OTHER SPECIFIED POSTPROCEDURAL STATES: Chronic | ICD-10-CM

## 2023-08-20 LAB
ALBUMIN SERPL ELPH-MCNC: 3.7 G/DL — SIGNIFICANT CHANGE UP (ref 3.4–5)
ALP SERPL-CCNC: 83 U/L — SIGNIFICANT CHANGE UP (ref 40–120)
ALT FLD-CCNC: 19 U/L — SIGNIFICANT CHANGE UP (ref 12–42)
AMPHET UR-MCNC: NEGATIVE — SIGNIFICANT CHANGE UP
ANION GAP SERPL CALC-SCNC: 7 MMOL/L — LOW (ref 9–16)
APPEARANCE UR: CLEAR — SIGNIFICANT CHANGE UP
AST SERPL-CCNC: 38 U/L — HIGH (ref 15–37)
BACTERIA # UR AUTO: PRESENT /HPF — SIGNIFICANT CHANGE UP
BARBITURATES UR SCN-MCNC: NEGATIVE — SIGNIFICANT CHANGE UP
BASOPHILS # BLD AUTO: 0.04 K/UL — SIGNIFICANT CHANGE UP (ref 0–0.2)
BASOPHILS NFR BLD AUTO: 0.9 % — SIGNIFICANT CHANGE UP (ref 0–2)
BENZODIAZ UR-MCNC: NEGATIVE — SIGNIFICANT CHANGE UP
BILIRUB SERPL-MCNC: 0.5 MG/DL — SIGNIFICANT CHANGE UP (ref 0.2–1.2)
BILIRUB UR-MCNC: NEGATIVE — SIGNIFICANT CHANGE UP
BUN SERPL-MCNC: 30 MG/DL — HIGH (ref 7–23)
CALCIUM SERPL-MCNC: 8.9 MG/DL — SIGNIFICANT CHANGE UP (ref 8.5–10.5)
CHLORIDE SERPL-SCNC: 101 MMOL/L — SIGNIFICANT CHANGE UP (ref 96–108)
CO2 SERPL-SCNC: 28 MMOL/L — SIGNIFICANT CHANGE UP (ref 22–31)
COCAINE METAB.OTHER UR-MCNC: POSITIVE
COLOR SPEC: YELLOW — SIGNIFICANT CHANGE UP
CREAT SERPL-MCNC: 1.66 MG/DL — HIGH (ref 0.5–1.3)
CRP SERPL-MCNC: 6 MG/L — SIGNIFICANT CHANGE UP (ref 0–9)
DIFF PNL FLD: NEGATIVE — SIGNIFICANT CHANGE UP
EGFR: 46 ML/MIN/1.73M2 — LOW
EOSINOPHIL # BLD AUTO: 0.21 K/UL — SIGNIFICANT CHANGE UP (ref 0–0.5)
EOSINOPHIL NFR BLD AUTO: 4.6 % — SIGNIFICANT CHANGE UP (ref 0–6)
EPI CELLS # UR: PRESENT
ETHANOL SERPL-MCNC: <3 MG/DL — SIGNIFICANT CHANGE UP
GLUCOSE SERPL-MCNC: 121 MG/DL — HIGH (ref 70–99)
GLUCOSE UR QL: NEGATIVE MG/DL — SIGNIFICANT CHANGE UP
HCT VFR BLD CALC: 45.2 % — SIGNIFICANT CHANGE UP (ref 39–50)
HGB BLD-MCNC: 14.8 G/DL — SIGNIFICANT CHANGE UP (ref 13–17)
IMM GRANULOCYTES NFR BLD AUTO: 0.2 % — SIGNIFICANT CHANGE UP (ref 0–0.9)
KETONES UR-MCNC: ABNORMAL MG/DL
LACTATE BLDV-MCNC: 1.7 MMOL/L — SIGNIFICANT CHANGE UP (ref 0.5–2)
LEUKOCYTE ESTERASE UR-ACNC: NEGATIVE — SIGNIFICANT CHANGE UP
LYMPHOCYTES # BLD AUTO: 1.77 K/UL — SIGNIFICANT CHANGE UP (ref 1–3.3)
LYMPHOCYTES # BLD AUTO: 38.8 % — SIGNIFICANT CHANGE UP (ref 13–44)
MCHC RBC-ENTMCNC: 27.4 PG — SIGNIFICANT CHANGE UP (ref 27–34)
MCHC RBC-ENTMCNC: 32.7 GM/DL — SIGNIFICANT CHANGE UP (ref 32–36)
MCV RBC AUTO: 83.5 FL — SIGNIFICANT CHANGE UP (ref 80–100)
METHADONE UR-MCNC: NEGATIVE — SIGNIFICANT CHANGE UP
MONOCYTES # BLD AUTO: 0.33 K/UL — SIGNIFICANT CHANGE UP (ref 0–0.9)
MONOCYTES NFR BLD AUTO: 7.2 % — SIGNIFICANT CHANGE UP (ref 2–14)
NEUTROPHILS # BLD AUTO: 2.2 K/UL — SIGNIFICANT CHANGE UP (ref 1.8–7.4)
NEUTROPHILS NFR BLD AUTO: 48.3 % — SIGNIFICANT CHANGE UP (ref 43–77)
NITRITE UR-MCNC: NEGATIVE — SIGNIFICANT CHANGE UP
NRBC # BLD: 0 /100 WBCS — SIGNIFICANT CHANGE UP (ref 0–0)
OPIATES UR-MCNC: NEGATIVE — SIGNIFICANT CHANGE UP
PCP SPEC-MCNC: SIGNIFICANT CHANGE UP
PCP UR-MCNC: NEGATIVE — SIGNIFICANT CHANGE UP
PH UR: 6 — SIGNIFICANT CHANGE UP (ref 5–8)
PLATELET # BLD AUTO: 413 K/UL — HIGH (ref 150–400)
POTASSIUM SERPL-MCNC: 3.7 MMOL/L — SIGNIFICANT CHANGE UP (ref 3.5–5.3)
POTASSIUM SERPL-SCNC: 3.7 MMOL/L — SIGNIFICANT CHANGE UP (ref 3.5–5.3)
PROT SERPL-MCNC: 8.5 G/DL — HIGH (ref 6.4–8.2)
PROT UR-MCNC: 100 MG/DL
RBC # BLD: 5.41 M/UL — SIGNIFICANT CHANGE UP (ref 4.2–5.8)
RBC # FLD: 16.5 % — HIGH (ref 10.3–14.5)
RBC CASTS # UR COMP ASSIST: 0 /HPF — SIGNIFICANT CHANGE UP (ref 0–4)
SARS-COV-2 RNA SPEC QL NAA+PROBE: SIGNIFICANT CHANGE UP
SODIUM SERPL-SCNC: 136 MMOL/L — SIGNIFICANT CHANGE UP (ref 132–145)
SP GR SPEC: 1.02 — SIGNIFICANT CHANGE UP (ref 1–1.03)
THC UR QL: NEGATIVE — SIGNIFICANT CHANGE UP
TROPONIN I, HIGH SENSITIVITY RESULT: 14.2 NG/L — SIGNIFICANT CHANGE UP
UROBILINOGEN FLD QL: 1 MG/DL — SIGNIFICANT CHANGE UP (ref 0.2–1)
WBC # BLD: 4.56 K/UL — SIGNIFICANT CHANGE UP (ref 3.8–10.5)
WBC # FLD AUTO: 4.56 K/UL — SIGNIFICANT CHANGE UP (ref 3.8–10.5)
WBC UR QL: 1 /HPF — SIGNIFICANT CHANGE UP (ref 0–5)

## 2023-08-20 PROCEDURE — 99285 EMERGENCY DEPT VISIT HI MDM: CPT

## 2023-08-20 PROCEDURE — 74176 CT ABD & PELVIS W/O CONTRAST: CPT | Mod: 26

## 2023-08-20 RX ORDER — VANCOMYCIN HCL 1 G
1250 VIAL (EA) INTRAVENOUS ONCE
Refills: 0 | Status: COMPLETED | OUTPATIENT
Start: 2023-08-20 | End: 2023-08-20

## 2023-08-20 RX ORDER — SODIUM CHLORIDE 9 MG/ML
2300 INJECTION INTRAMUSCULAR; INTRAVENOUS; SUBCUTANEOUS ONCE
Refills: 0 | Status: COMPLETED | OUTPATIENT
Start: 2023-08-20 | End: 2023-08-20

## 2023-08-20 RX ORDER — PIPERACILLIN AND TAZOBACTAM 4; .5 G/20ML; G/20ML
3.38 INJECTION, POWDER, LYOPHILIZED, FOR SOLUTION INTRAVENOUS ONCE
Refills: 0 | Status: COMPLETED | OUTPATIENT
Start: 2023-08-20 | End: 2023-08-20

## 2023-08-20 RX ORDER — KETOROLAC TROMETHAMINE 30 MG/ML
30 SYRINGE (ML) INJECTION ONCE
Refills: 0 | Status: DISCONTINUED | OUTPATIENT
Start: 2023-08-20 | End: 2023-08-20

## 2023-08-20 RX ADMIN — Medication 30 MILLIGRAM(S): at 19:53

## 2023-08-20 RX ADMIN — Medication 250 MILLIGRAM(S): at 23:55

## 2023-08-20 RX ADMIN — PIPERACILLIN AND TAZOBACTAM 200 GRAM(S): 4; .5 INJECTION, POWDER, LYOPHILIZED, FOR SOLUTION INTRAVENOUS at 21:17

## 2023-08-20 RX ADMIN — SODIUM CHLORIDE 2300 MILLILITER(S): 9 INJECTION INTRAMUSCULAR; INTRAVENOUS; SUBCUTANEOUS at 21:17

## 2023-08-20 NOTE — ED PROVIDER NOTE - NSICDXPASTMEDICALHX_GEN_ALL_CORE_FT
PAST MEDICAL HISTORY:  2019 novel coronavirus disease (COVID-19) w/intubation    Arthritis     Chronic back pain     DJD (degenerative joint disease)     Thyroid disease

## 2023-08-20 NOTE — ED PROVIDER NOTE - PHYSICAL EXAMINATION
VITAL SIGNS: I have reviewed nursing notes and confirm.  CONSTITUTIONAL: Well-developed; Appears disheveled  HEAD: Normocephalic; atraumatic.  EYES: EOM intact; conjunctiva and sclera clear.  ENT: nose appears normal  NECK: Supple, no midline c-spine tenderness  CARD: S1, S2 normal; no murmurs, gallops, or rubs. Regular rate and rhythm.  RESP: No wheezes, rales or rhonchi.  EXT: No obvious deformity  NEURO: Alert, oriented. Grossly unremarkable.  PSYCH: Cooperative, appropriate.

## 2023-08-20 NOTE — ED PROVIDER NOTE - PRO INTERPRETER NEED 2
English Is This A New Presentation, Or A Follow-Up?: Skin Lesion Has Your Skin Lesion Been Treated?: not been treated

## 2023-08-20 NOTE — ED PROVIDER NOTE - OBJECTIVE STATEMENT
63-year-old male with a history of alcohol abuse and chronic back pain presenting after being found sleeping outside.  He states he has back pain which is chronic.  He also admits to heavy alcohol use today.  He denies any recent falls or other injuries.

## 2023-08-20 NOTE — ED PROVIDER NOTE - CLINICAL SUMMARY MEDICAL DECISION MAKING FREE TEXT BOX
63-year-old male with a history of chronic back pain presenting acutely intoxicated complaining that his back is bothering him.  He is also acutely intoxicated.  Denies any other drug use.  Will allow the patient to metabolize and reevaluate once more sober.  We will plan for treatment with a dose of Toradol and discharge once more awake and alert.

## 2023-08-20 NOTE — ED ADULT NURSE NOTE - NSFALLUNIVINTERV_ED_ALL_ED
Bed/Stretcher in lowest position, wheels locked, appropriate side rails in place/Call bell, personal items and telephone in reach/Instruct patient to call for assistance before getting out of bed/chair/stretcher/Non-slip footwear applied when patient is off stretcher/[place] to call system/Physically safe environment - no spills, clutter or unnecessary equipment/Purposeful proactive rounding/Room/bathroom lighting operational, light cord in reach

## 2023-08-21 ENCOUNTER — TRANSCRIPTION ENCOUNTER (OUTPATIENT)
Age: 63
End: 2023-08-21

## 2023-08-21 ENCOUNTER — APPOINTMENT (OUTPATIENT)
Dept: UROLOGY | Facility: HOSPITAL | Age: 63
End: 2023-08-21

## 2023-08-21 ENCOUNTER — APPOINTMENT (OUTPATIENT)
Dept: NUCLEAR MEDICINE | Facility: HOSPITAL | Age: 63
End: 2023-08-21

## 2023-08-21 DIAGNOSIS — Z29.9 ENCOUNTER FOR PROPHYLACTIC MEASURES, UNSPECIFIED: ICD-10-CM

## 2023-08-21 DIAGNOSIS — F10.10 ALCOHOL ABUSE, UNCOMPLICATED: ICD-10-CM

## 2023-08-21 DIAGNOSIS — N18.30 CHRONIC KIDNEY DISEASE, STAGE 3 UNSPECIFIED: ICD-10-CM

## 2023-08-21 DIAGNOSIS — N13.30 UNSPECIFIED HYDRONEPHROSIS: ICD-10-CM

## 2023-08-21 DIAGNOSIS — E03.9 HYPOTHYROIDISM, UNSPECIFIED: ICD-10-CM

## 2023-08-21 DIAGNOSIS — M54.9 DORSALGIA, UNSPECIFIED: ICD-10-CM
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
## 2023-08-21 PROBLEM — U07.1 COVID-19: Chronic | Status: ACTIVE | Noted: 2023-08-18

## 2023-08-21 LAB
ANION GAP SERPL CALC-SCNC: 7 MMOL/L — SIGNIFICANT CHANGE UP (ref 5–17)
APTT BLD: 33.5 SEC — SIGNIFICANT CHANGE UP (ref 24.5–35.6)
BASOPHILS # BLD AUTO: 0.04 K/UL — SIGNIFICANT CHANGE UP (ref 0–0.2)
BASOPHILS NFR BLD AUTO: 1 % — SIGNIFICANT CHANGE UP (ref 0–2)
BLD GP AB SCN SERPL QL: NEGATIVE — SIGNIFICANT CHANGE UP
BUN SERPL-MCNC: 25 MG/DL — HIGH (ref 7–23)
CALCIUM SERPL-MCNC: 7.9 MG/DL — LOW (ref 8.4–10.5)
CHLORIDE SERPL-SCNC: 109 MMOL/L — HIGH (ref 96–108)
CO2 SERPL-SCNC: 23 MMOL/L — SIGNIFICANT CHANGE UP (ref 22–31)
CREAT SERPL-MCNC: 1.38 MG/DL — HIGH (ref 0.5–1.3)
EGFR: 58 ML/MIN/1.73M2 — LOW
EOSINOPHIL # BLD AUTO: 0.23 K/UL — SIGNIFICANT CHANGE UP (ref 0–0.5)
EOSINOPHIL NFR BLD AUTO: 5.7 % — SIGNIFICANT CHANGE UP (ref 0–6)
ERYTHROCYTE [SEDIMENTATION RATE] IN BLOOD: 10 MM/HR — SIGNIFICANT CHANGE UP
ERYTHROCYTE [SEDIMENTATION RATE] IN BLOOD: 90 MM/HR — HIGH (ref 0–20)
GLUCOSE SERPL-MCNC: 100 MG/DL — HIGH (ref 70–99)
HCT VFR BLD CALC: 37.8 % — LOW (ref 39–50)
HGB BLD-MCNC: 12 G/DL — LOW (ref 13–17)
IMM GRANULOCYTES NFR BLD AUTO: 0.5 % — SIGNIFICANT CHANGE UP (ref 0–0.9)
INR BLD: 1.02 — SIGNIFICANT CHANGE UP (ref 0.85–1.18)
LYMPHOCYTES # BLD AUTO: 1.03 K/UL — SIGNIFICANT CHANGE UP (ref 1–3.3)
LYMPHOCYTES # BLD AUTO: 25.5 % — SIGNIFICANT CHANGE UP (ref 13–44)
MAGNESIUM SERPL-MCNC: 2.2 MG/DL — SIGNIFICANT CHANGE UP (ref 1.6–2.6)
MCHC RBC-ENTMCNC: 27.1 PG — SIGNIFICANT CHANGE UP (ref 27–34)
MCHC RBC-ENTMCNC: 31.7 GM/DL — LOW (ref 32–36)
MCV RBC AUTO: 85.5 FL — SIGNIFICANT CHANGE UP (ref 80–100)
MONOCYTES # BLD AUTO: 0.38 K/UL — SIGNIFICANT CHANGE UP (ref 0–0.9)
MONOCYTES NFR BLD AUTO: 9.4 % — SIGNIFICANT CHANGE UP (ref 2–14)
NEUTROPHILS # BLD AUTO: 2.34 K/UL — SIGNIFICANT CHANGE UP (ref 1.8–7.4)
NEUTROPHILS NFR BLD AUTO: 57.9 % — SIGNIFICANT CHANGE UP (ref 43–77)
NRBC # BLD: 0 /100 WBCS — SIGNIFICANT CHANGE UP (ref 0–0)
PHOSPHATE SERPL-MCNC: 2.7 MG/DL — SIGNIFICANT CHANGE UP (ref 2.5–4.5)
PLATELET # BLD AUTO: 273 K/UL — SIGNIFICANT CHANGE UP (ref 150–400)
POTASSIUM SERPL-MCNC: 3.4 MMOL/L — LOW (ref 3.5–5.3)
POTASSIUM SERPL-SCNC: 3.4 MMOL/L — LOW (ref 3.5–5.3)
PROTHROM AB SERPL-ACNC: 11.6 SEC — SIGNIFICANT CHANGE UP (ref 9.5–13)
RBC # BLD: 4.42 M/UL — SIGNIFICANT CHANGE UP (ref 4.2–5.8)
RBC # FLD: 16.2 % — HIGH (ref 10.3–14.5)
RH IG SCN BLD-IMP: POSITIVE — SIGNIFICANT CHANGE UP
SODIUM SERPL-SCNC: 139 MMOL/L — SIGNIFICANT CHANGE UP (ref 135–145)
T4 AB SER-ACNC: 2.52 UG/DL — LOW (ref 4.5–11.7)
T4 FREE SERPL-MCNC: <0.378 NG/DL — LOW (ref 0.93–1.7)
TSH SERPL-MCNC: 26.13 UIU/ML — HIGH (ref 0.27–4.2)
WBC # BLD: 4.04 K/UL — SIGNIFICANT CHANGE UP (ref 3.8–10.5)
WBC # FLD AUTO: 4.04 K/UL — SIGNIFICANT CHANGE UP (ref 3.8–10.5)

## 2023-08-21 PROCEDURE — 99222 1ST HOSP IP/OBS MODERATE 55: CPT | Mod: GC

## 2023-08-21 PROCEDURE — 52351 CYSTOURETERO & OR PYELOSCOPE: CPT | Mod: LT

## 2023-08-21 PROCEDURE — 74420 UROGRAPHY RTRGR +-KUB: CPT | Mod: 26,LT

## 2023-08-21 DEVICE — GUIDEWIRE SENSOR DUAL-FLEX NITINOL STRAIGHT .038" X 150CM: Type: IMPLANTABLE DEVICE | Status: FUNCTIONAL

## 2023-08-21 RX ORDER — LANOLIN ALCOHOL/MO/W.PET/CERES
3 CREAM (GRAM) TOPICAL AT BEDTIME
Refills: 0 | Status: DISCONTINUED | OUTPATIENT
Start: 2023-08-21 | End: 2023-08-21

## 2023-08-21 RX ORDER — ACETAMINOPHEN 500 MG
650 TABLET ORAL EVERY 6 HOURS
Refills: 0 | Status: DISCONTINUED | OUTPATIENT
Start: 2023-08-21 | End: 2023-08-21

## 2023-08-21 RX ORDER — LIDOCAINE 4 G/100G
1 CREAM TOPICAL EVERY 24 HOURS
Refills: 0 | Status: DISCONTINUED | OUTPATIENT
Start: 2023-08-21 | End: 2023-08-21

## 2023-08-21 RX ORDER — THIAMINE MONONITRATE (VIT B1) 100 MG
100 TABLET ORAL DAILY
Refills: 0 | Status: DISCONTINUED | OUTPATIENT
Start: 2023-08-21 | End: 2023-08-21

## 2023-08-21 RX ORDER — GABAPENTIN 400 MG/1
200 CAPSULE ORAL THREE TIMES A DAY
Refills: 0 | Status: DISCONTINUED | OUTPATIENT
Start: 2023-08-21 | End: 2023-08-21

## 2023-08-21 RX ORDER — OXYCODONE HYDROCHLORIDE 5 MG/1
5 TABLET ORAL EVERY 6 HOURS
Refills: 0 | Status: DISCONTINUED | OUTPATIENT
Start: 2023-08-21 | End: 2023-08-21

## 2023-08-21 RX ORDER — LEVOTHYROXINE SODIUM 125 MCG
150 TABLET ORAL DAILY
Refills: 0 | Status: DISCONTINUED | OUTPATIENT
Start: 2023-08-21 | End: 2023-08-21

## 2023-08-21 RX ORDER — POTASSIUM CHLORIDE 20 MEQ
40 PACKET (EA) ORAL EVERY 4 HOURS
Refills: 0 | Status: DISCONTINUED | OUTPATIENT
Start: 2023-08-21 | End: 2023-08-21

## 2023-08-21 RX ORDER — FOLIC ACID 0.8 MG
1 TABLET ORAL DAILY
Refills: 0 | Status: DISCONTINUED | OUTPATIENT
Start: 2023-08-21 | End: 2023-08-21

## 2023-08-21 RX ADMIN — Medication 40 MILLIEQUIVALENT(S): at 13:26

## 2023-08-21 RX ADMIN — Medication 100 MILLIGRAM(S): at 13:26

## 2023-08-21 RX ADMIN — Medication 1 MILLIGRAM(S): at 13:26

## 2023-08-21 RX ADMIN — Medication 150 MICROGRAM(S): at 06:42

## 2023-08-21 RX ADMIN — Medication 1 TABLET(S): at 13:26

## 2023-08-21 RX ADMIN — GABAPENTIN 200 MILLIGRAM(S): 400 CAPSULE ORAL at 13:26

## 2023-08-21 RX ADMIN — GABAPENTIN 200 MILLIGRAM(S): 400 CAPSULE ORAL at 06:42

## 2023-08-21 NOTE — PROGRESS NOTE ADULT - PROBLEM SELECTOR PLAN 4
Diagnosed in April on Synthroid 150 mcg daily.     - AM TSH  - Continue home synthroid. Diagnosed in April on Synthroid 150 mcg daily.   -TSH: 26.13    -Check T4  -Endo consult  - Continue home synthroid.

## 2023-08-21 NOTE — H&P ADULT - PROBLEM SELECTOR PLAN 6
F: 2.3L NS.   E: Replete PRN.   N: Regular diet.   DVT ppx: Holding pending possible procedure.   Dispo: [facility]. Patient with history of intermittent alcohol abuse, frequent falls while using alcohol. Currently p/w 6 alcoholic beverages today, Denies history of withdrawal, DTs, or seizure.     - Continue to monitor symptomatically. Patient with history of intermittent alcohol abuse, frequent falls while using alcohol. Currently p/w 6 alcoholic beverages today, Denies history of withdrawal, DTs, or seizure.     - Continue to monitor symptomatically.    # Protein gap with lytic lesions: Previously worked up with SPEP/UPEP and found to be within normal limits.

## 2023-08-21 NOTE — PROGRESS NOTE ADULT - PROBLEM SELECTOR PLAN 3
Patient underwent a fall after drinking alcohol today, similar to presentation in April. At that time CT head was negative, patient denies head trauma or head pain at this time but is unable to remember the event. AOX3 at this time.     - Pending EKG  - PT in AM.  - Would defer head CT at this time given no evidence of acute injury, neurologic deficit, or blood thinners. Patient underwent a fall after drinking alcohol today, similar to presentation in April. At that time CT head was negative, patient denies head trauma or head pain at this time but is unable to remember the event. AOX3 at this time.     - PT   - Would defer head CT at this time given no evidence of acute injury, neurologic deficit, or blood thinners.

## 2023-08-21 NOTE — PROGRESS NOTE ADULT - ASSESSMENT
Mr. Healy is a 63 year old male with a past medical history of chronic back pain with concerns for L4/L5 osteomyelitis, alcohol abuse without withdrawal history who was brought in to [facility] after being found sleeping on the ground intoxicated, admitted for further management of possible osteomyelitis and hydronephrosis.

## 2023-08-21 NOTE — H&P ADULT - PROBLEM SELECTOR PLAN 4
Patient underwent a fall after drinking alcohol today, similar to presentation in April. At that time CT head was negative, patient denies head trauma or head pain at this time but is unable to remember the event. AOX3 at this time.     - Pending EKG  - PT in AM.  - Would defer head CT at this time given no evidence of acute injury, neurologic deficit, or blood thinners. Diagnosed in April on Synthroid 150 mcg daily.     - AM TSH  - Continue home synthroid.

## 2023-08-21 NOTE — H&P ADULT - NSHPREVIEWOFSYSTEMS_GEN_ALL_CORE
REVIEW OF SYSTEMS:    CONSTITUTIONAL: No fever, chills, or weakness.   EYES/ENT: No visual changes;  No ear pain, runny nose, or sore throat.   NECK: No pain or stiffness.  RESPIRATORY: No cough, wheezing, hemoptysis; No shortness of breath.  CARDIOVASCULAR: No chest pain, dyspnea on exertion, or palpitations.  GASTROINTESTINAL: No abdominal or epigastric pain. No nausea, vomiting, or hematemesis; No diarrhea or constipation. No melena or hematochezia.  GENITOURINARY: No dysuria, frequency or hematuria.  NEUROLOGICAL: (+) back pain.   SKIN: No itching, rashes.

## 2023-08-21 NOTE — PROGRESS NOTE ADULT - PROBLEM SELECTOR PLAN 1
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 Patient underwent biopsy that did not yield bacterial data and refused discharge to [facility] for IV antibiotics while this was worked up. Patient with consistently low ESR/CRP/leukocytosis and no fevers. Repeat MR lumbar also showing OM vs discitis, though no progression of disease. History of back injections for pain management. Planned for dual isotope bone/gallium scan with SPECT CT with ID for differentiation.     - Discuss efficacy of achieving inpatient dual isotope bone/gallium scan with SPECT CT.   - Would hold antibiotics at this time given prolonged duration off of antibiotic therapy without progression pending diagnostic workup.  - Continue home gabapentin dose. 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 Patient underwent biopsy that did not yield bacterial data and refused discharge to [facility] for IV antibiotics while this was worked up. Patient with consistently low ESR/CRP/leukocytosis and no fevers.   Repeat MR lumbar also showing OM vs discitis, though no progression of disease. History of back injections for pain management. Planned for dual isotope bone/gallium scan with SPECT CT with ID for differentiation.     Plan:  - Likely follow up outpatient for dual isotope bone/gallium scan with SPECT CT and work up for back pain.   - Would hold antibiotics at this time given prolonged duration off of antibiotic therapy without progression  - Continue home gabapentin dose.

## 2023-08-21 NOTE — H&P ADULT - ATTENDING COMMENTS
63 YOM with PMH of CKD, hypothyroidism, chronic LBP, alcohol use disorder who presented for alcohol intoxication and acute on chronic low back pain, admitted for further evaluation of chronic left hydronephrosis.     Acute on chronic LBP - being evaluated outpatient for chronic OM vs discitis vs degenerative disease, bone biopsy negative and pending Bone-Gallium SPECT CT, afebrile without leukocytosis, monitor off antibiotics and outpatient follow up with ID. Cont with pain control, PT eval.    Severe L hydronephrosis - initially noted 4/2023, missed outpatient L ureteroscopy (was admitted at OSH for CP), procedure was re-scheduled for today - discuss with urology.     Hypothyroidism - TSH 26, suspect is not taking levothyroxine but will check T4 and consult endocrine.    Alcohol use disorder - monitor for signs of withdrawal (does not drink daily)    Hypokalemia - replete PRN    CKD - renally dose meds, avoid nephrotoxins .

## 2023-08-21 NOTE — PRE-ANESTHESIA EVALUATION ADULT - NSANTHOSAYNRD_GEN_A_CORE
No. RAIZA screening performed.  STOP BANG Legend: 0-2 = LOW Risk; 3-4 = INTERMEDIATE Risk; 5-8 = HIGH Risk

## 2023-08-21 NOTE — H&P ADULT - NSHPLABSRESULTS_GEN_ALL_CORE
.  LABS:                         14.8   4.56  )-----------( 413      ( 20 Aug 2023 20:22 )             45.2     08    136  |  101  |  30<H>  ----------------------------<  121<H>  3.7   |  28  |  1.66<H>    Ca    8.9      20 Aug 2023 20:22    TPro  8.5<H>  /  Alb  3.7  /  TBili  0.5  /  DBili  x   /  AST  38<H>  /  ALT  19  /  AlkPhos  83        Urinalysis Basic - ( 20 Aug 2023 20:22 )    Color: Yellow / Appearance: Clear / S.025 / pH: x  Gluc: 121 mg/dL / Ketone: Trace mg/dL  / Bili: Negative / Urobili: 1.0 mg/dL   Blood: x / Protein: 100 mg/dL / Nitrite: Negative   Leuk Esterase: Negative / RBC: 0 /HPF / WBC 1 /HPF   Sq Epi: x / Non Sq Epi: x / Bacteria: present /HPF                RADIOLOGY, EKG & ADDITIONAL TESTS: Reviewed.      PET/NM scan : Focal uptake at L4-L5, within the disc space and adjacent endplates, predominantly at the posterior aspect of the inferior endplate of L4 extending into the left facet joint, with associated bony destruction, consistent with discitis/osteomyelitis in the appropriate clinical setting.

## 2023-08-21 NOTE — PROGRESS NOTE ADULT - PROBLEM SELECTOR PLAN 7
F: 2.3L NS.   E: Replete PRN.   N: Regular diet.   DVT ppx: Holding pending possible procedure.   Dispo: [facility]. F: 2.3L NS.   E: Replete PRN.   N: Regular diet.   DVT ppx: Holding pending possible procedure.   Dispo:

## 2023-08-21 NOTE — BRIEF OPERATIVE NOTE - OPERATION/FINDINGS
L mid to distal ureter obliterated unable to be cannulated with wire, retrograde pyelogram without retrograde passage of contrast

## 2023-08-21 NOTE — H&P ADULT - PROBLEM SELECTOR PLAN 1
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 Patient underwent biopsy that did not yield bacterial data and refused discharge to [facility] for IV antibiotics while this was worked up. Patient with consistently low ESR/CRP/leukocytosis and no fevers. Repeat MR lumbar also showing OM vs discitis, though no progression of disease. History of back injections for pain management. Planned for dual isotope bone/gallium scan with SPECT CT with ID for differentiation.     - ID consult.   - Discuss efficacy of achieving inpatient dual isotope bone/gallium scan with SPECT CT.   - Would hold antibiotics at this time given prolonged duration off of antibiotic therapy without progression pending diagnostic workup.  - Continue home gabapentin dose.

## 2023-08-21 NOTE — H&P ADULT - ASSESSMENT
Mr. Healy is a 63 year old male with a past medical history of chronic back pain with concerns for L4/L5 osteomyelitis, alcohol abuse without withdrawal history who was brought in to [facility] after being found sleeping on the ground intoxicated, admitted for further management of possible osteomyelitis and hydronephrosis.

## 2023-08-21 NOTE — PROGRESS NOTE ADULT - SUBJECTIVE AND OBJECTIVE BOX
OVERNIGHT EVENTS: ALIRIO    SUBJECTIVE / INTERVAL HPI: Patient seen and examined at bedside. Pt still complains of lower back pain, 5/10 this morning. Denies any fever, chills, numbness, tingling, sob, chest pain.     VITAL SIGNS:  Vital Signs Last 24 Hrs  T(C): 36.9 (21 Aug 2023 09:17), Max: 37 (20 Aug 2023 14:45)  T(F): 98.4 (21 Aug 2023 09:17), Max: 98.6 (20 Aug 2023 14:45)  HR: 64 (21 Aug 2023 09:17) (60 - 78)  BP: 121/74 (21 Aug 2023 09:17) (107/69 - 144/76)  BP(mean): --  RR: 18 (21 Aug 2023 09:17) (15 - 18)  SpO2: 98% (21 Aug 2023 09:17) (94% - 99%)    Parameters below as of 21 Aug 2023 09:17  Patient On (Oxygen Delivery Method): room air        PHYSICAL EXAM:    General: NAD  HEENT: NC/AT; PERRL, anicteric sclera; MMM  Neck: supple w/o palpable nodularity  Cardiovascular: +S1/S2; RRR  Respiratory: CTA B/L; no W/R/R  Gastrointestinal: soft, NT/ND; +BSx4  Extremities: WWP; no edema, clubbing or cyanosis  Vascular: 2+ radial, DP/PT pulses B/L  Neurological: AAOx3; no focal deficits    MEDICATIONS:  MEDICATIONS  (STANDING):  folic acid 1 milliGRAM(s) Oral daily  gabapentin 200 milliGRAM(s) Oral three times a day  levothyroxine 150 MICROGram(s) Oral daily  lidocaine   4% Patch 1 Patch Transdermal every 24 hours  multivitamin 1 Tablet(s) Oral daily  potassium chloride    Tablet ER 40 milliEquivalent(s) Oral every 4 hours  thiamine 100 milliGRAM(s) Oral daily    MEDICATIONS  (PRN):  acetaminophen     Tablet .. 650 milliGRAM(s) Oral every 6 hours PRN Temp greater or equal to 38C (100.4F), Mild Pain (1 - 3)  melatonin 3 milliGRAM(s) Oral at bedtime PRN Insomnia  oxyCODONE    IR 5 milliGRAM(s) Oral every 6 hours PRN Moderate Pain (4 - 6)      ALLERGIES:  Allergies    No Known Allergies    Intolerances        LABS:                        12.0   4.04  )-----------( 273      ( 21 Aug 2023 05:30 )             37.8     08-21    139  |  109<H>  |  25<H>  ----------------------------<  100<H>  3.4<L>   |  23  |  1.38<H>    Ca    7.9<L>      21 Aug 2023 05:30  Phos  2.7     08-21  Mg     2.2     08-21    TPro  8.5<H>  /  Alb  3.7  /  TBili  0.5  /  DBili  x   /  AST  38<H>  /  ALT  19  /  AlkPhos  83  08-20    PT/INR - ( 21 Aug 2023 05:30 )   PT: 11.6 sec;   INR: 1.02          PTT - ( 21 Aug 2023 05:30 )  PTT:33.5 sec  Urinalysis Basic - ( 21 Aug 2023 05:30 )    Color: x / Appearance: x / SG: x / pH: x  Gluc: 100 mg/dL / Ketone: x  / Bili: x / Urobili: x   Blood: x / Protein: x / Nitrite: x   Leuk Esterase: x / RBC: x / WBC x   Sq Epi: x / Non Sq Epi: x / Bacteria: x      CAPILLARY BLOOD GLUCOSE      POCT Blood Glucose.: 101 mg/dL (20 Aug 2023 14:50)      RADIOLOGY & ADDITIONAL TESTS: Reviewed.

## 2023-08-21 NOTE — H&P ADULT - PROBLEM SELECTOR PLAN 7
F: 2.3L NS.   E: Replete PRN.   N: Regular diet.   DVT ppx: Holding pending possible procedure.   Dispo: [facility].

## 2023-08-21 NOTE — PROGRESS NOTE ADULT - PROBLEM SELECTOR PLAN 6
Patient with history of intermittent alcohol abuse, frequent falls while using alcohol. Currently p/w 6 alcoholic beverages today, Denies history of withdrawal, DTs, or seizure.     - Continue to monitor symptomatically.    # Protein gap with lytic lesions: Previously worked up with SPEP/UPEP and found to be within normal limits.

## 2023-08-21 NOTE — CHART NOTE - NSCHARTNOTEFT_GEN_A_CORE
Attempted unsuccessful L URS on 8/21/23. Distal-mid ureter noted to be entirely obstructed vs obliterated with inability to pass wire or contrast retrograde beyond area of obstruction. Given patient declining renal function and degree of L hydronephrosis on CT A/P, patient will require L PCN for urinary diversion. Urology to consider possible intervention following IR PCN placement with possible antegrade ureteroscopy, pending patient disposition and inpatient hospital course. Please reach out to Urology if any questions or concerns, Urology will continue to follow.

## 2023-08-21 NOTE — H&P ADULT - PROBLEM SELECTOR PLAN 2
Unchanged severe left hydronephrosis with marked cortical thinning, Followed by Urology for this issue and outpatient planned for 08/14 but planned to be rescheduled with possible in hospital transition.     - Urology Consult in AM.  - Procedure tomorrow? will make paitent NPO, coags, type and screen.   - Monitor urine output. Unchanged severe left hydronephrosis with marked cortical thinning, Followed by Urology for this issue and outpatient planned for 08/14 but planned to be rescheduled with possible in hospital transition.     - Urology Consult in AM.  - Procedure planned for 1400 8/21 will make paitent NPO, coags, type and screen, CXR, EKG.   - Monitor urine output. Unchanged severe left hydronephrosis with marked cortical thinning, Followed by Urology for this issue and outpatient planned for 08/14 but planned to be rescheduled with possible in hospital transition.     - Urology mad aware of patient.   - Procedure planned for 1400 8/21 will make paitent NPO, coags, type and screen, CXR, EKG.   - Monitor urine output.

## 2023-08-21 NOTE — PROGRESS NOTE ADULT - PROBLEM SELECTOR PLAN 5
Stable CKD stage 3 since admission at last hospitalization, was seen by DR. Aldana in the outpatient setting. Planned to undergo above procedure to improve function of left kidney.     - Renally dose medications.   - Care with nephrotoxic medications.

## 2023-08-21 NOTE — PROGRESS NOTE ADULT - PROBLEM SELECTOR PLAN 2
Unchanged severe left hydronephrosis with marked cortical thinning, Followed by Urology for this issue and outpatient planned for 08/14 but planned to be rescheduled with possible in hospital transition.     - Urology mad aware of patient.   - Procedure planned for 1400 8/21 will make paitent NPO, coags, type and screen, CXR, EKG.   - Monitor urine output. Unchanged severe left hydronephrosis with marked cortical thinning, Followed by Urology for this issue and outpatient planned for 08/14 but planned to be rescheduled with possible in hospital transition.     - Urology made aware of patient.   - Procedure planned for 1400 8/21.   - Monitor urine output.

## 2023-08-21 NOTE — H&P ADULT - NSHPPHYSICALEXAM_GEN_ALL_CORE
VITAL SIGNS:  T(C): 36.8 (08-21-23 @ 01:33), Max: 37 (08-20-23 @ 14:45)  T(F): 98.2 (08-21-23 @ 01:33), Max: 98.6 (08-20-23 @ 14:45)  HR: 60 (08-21-23 @ 01:33) (60 - 78)  BP: 144/76 (08-21-23 @ 01:33) (122/82 - 144/76)  BP(mean): --  RR: 18 (08-21-23 @ 01:33) (15 - 18)  SpO2: 94% (08-21-23 @ 01:33) (94% - 98%)  Wt(kg): --    PHYSICAL EXAM:  Constitutional: Patient is in no acute distress, resting comfortably in bed.  HEENT: Atraumatic/normocephalic, no pain to palpation. mucous membranes moist.   Neck: supple;   Respiratory: Clear to auscultation bilaterally; no Wheezing/Crackles/Ronchi.  Cardiac: Regular rate and rhythm, S1/S2; no Murmur/Rub/Gallop;  Gastrointestinal: abdomen soft, non-tender and non-distended;  Back: spine midline, significant bony tenderness over the L4/L5 vertebral bodies, CVA on left but possibly referred pain from L4 per patient, 2+ radial, Dorsalis pedis and posterior tibial pulses bilaterally.  Dermatologic: skin warm, dry and intact;  Neurologic: AAOx3; VITAL SIGNS:  T(C): 36.8 (08-21-23 @ 01:33), Max: 37 (08-20-23 @ 14:45)  T(F): 98.2 (08-21-23 @ 01:33), Max: 98.6 (08-20-23 @ 14:45)  HR: 60 (08-21-23 @ 01:33) (60 - 78)  BP: 144/76 (08-21-23 @ 01:33) (122/82 - 144/76)  BP(mean): --  RR: 18 (08-21-23 @ 01:33) (15 - 18)  SpO2: 94% (08-21-23 @ 01:33) (94% - 98%)  Wt(kg): --    PHYSICAL EXAM:  Constitutional: Patient is in no acute distress, resting comfortably in bed.  HEENT: Atraumatic/normocephalic, no pain to palpation. mucous membranes moist.   Neck: supple;   Respiratory: Clear to auscultation bilaterally; no Wheezing/Crackles/Ronchi.  Cardiac: Regular rate and rhythm, S1/S2; no Murmur/Rub/Gallop;  Gastrointestinal: abdomen soft, non-tender and non-distended;  Back: spine midline, significant bony tenderness over the L4/L5 vertebral bodies, CVA on left but possibly referred pain from L4 per patient, 2+ radial, Dorsalis pedis and posterior tibial pulses bilaterally. Strength 5/5 in upper and lower extremities.   Dermatologic: skin warm, dry and intact;  Neurologic: AAOx3;

## 2023-08-21 NOTE — H&P ADULT - PROBLEM SELECTOR PLAN 3
Stable CKD stage 3 since admission at last hospitalization, was seen by DR. Aldana in the outpatient setting. Planned to undergo above procedure to improve function of left kidney.     - Renally dose medications.   - Care with nephrotoxic medications. Patient underwent a fall after drinking alcohol today, similar to presentation in April. At that time CT head was negative, patient denies head trauma or head pain at this time but is unable to remember the event. AOX3 at this time.     - Pending EKG  - PT in AM.  - Would defer head CT at this time given no evidence of acute injury, neurologic deficit, or blood thinners.

## 2023-08-21 NOTE — H&P ADULT - PROBLEM SELECTOR PLAN 5
Patient with history of intermittent alcohol abuse, frequent falls while using alcohol. Currently p/w 6 alcoholic beverages today, Denies history of withdrawal, DTs, or seizure.     - Continue to monitor symptomatically. Stable CKD stage 3 since admission at last hospitalization, was seen by DR. Aldana in the outpatient setting. Planned to undergo above procedure to improve function of left kidney.     - Renally dose medications.   - Care with nephrotoxic medications.

## 2023-08-21 NOTE — H&P ADULT - HISTORY OF PRESENT ILLNESS
Mr. Bailey is a 63 year old male with a past medical history of chronic back pain with concerns for L4/L5 osteomyelitis, alcohol abuse who was brought in to [facility] after being found sleeping on the ground intoxicated, admitted for further management of possible osteomyelitis and nephrolithiasis.       Previously seen by nephrology for CHITO on CKD who recommended performance of cystoscopy to alleviate R hydronephrosis. Patient was scheduled for procedure 08/14 but missed the appointment as he was admitted at [facility] for chest pain.No intervention was performed at that time. Patient was planned for left uteroscopy w/endopyelotomy    Patient was seen by ID for concerns for osteomyelitis. Dr. Jiménez notes that she reviewed MRI lumbar spine with Dr. Johnson (Neuroradiology) on 7/31 with concerns for osteomyelitis/discitis L4-L5 without change from 4/2023. There is no progression and would have expected progression of findings over 3 months if infectious but alternatively could be very indolent organism. Patient was planned for a dual isotope bone/gallium scan with SPECT CT to try to differentiate, but patient reports that he was not aware of this plan and did not have this performed.     In the Ed vitals are afebrile, Hr of 78, BP of 137/89, saturating 98% on RA.   Labs show no leukocytosis/anemia, thrombocytosis, normal CRP, stable CKD 3, protein gap, alcohol negative, cocaine on UDS. Previously noted to have hypothyroidism. In the Ed the patient was treated with Vancomycin and Zosyn, 2.3 L NS IVF, Toradol 30 mg IVP and Oxycodone 5 mg for pain relief.   CT IMPRESSION: 1.   Unchanged severe left hydronephrosis with marked cortical thinning. 2.   No renal or ureteral calculi. 3.   Copious stool throughout the colon, suggesting constipation. 4.   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      Allergies: NKDA    Home meds:   thiamine 100 mg oral tablet: 1 tab(s) orally once a day, folic acid 1 mg oral tablet: 1 tab(s) orally once a day, Multiple Vitamins oral tablet: 1 tab(s) orally once a day, levothyroxine 150 mcg (0.15 mg) oral capsule: 1 orally per day, gabapentin 100 mg oral capsule: 2 orally 3 times a day, TraMADol 50 mg oral tablet: 0.5 orally 3 times a day    Left hydronephrosis: Urology consult for possible inpatient uretoscopy with endopylectomy.     OM: Monitor off of IV abx for now given extended hisotyr offf of Abx without progression of disease with concern for indolent bacteria. Previous biopsies of the area with only WBCs as a finding.   Previously treated with ?  - ID consult?     CKD: Followed outpaitent with Dr. Aldana, previous renal scans performed and noted to have significant left hydronephrosis, planned for procedure to improve . Conitnue to monitor Mr. Healy is a 63 year old male with a past medical history of chronic back pain with concerns for L4/L5 osteomyelitis, alcohol abuse without withdrawal history who was brought in to [facility] after being found sleeping on the ground intoxicated, admitted for further management of possible osteomyelitis and hydronephrosis.     Patient reports that he drank about 6 glasses of leigh Camacho this morning, does not remember falling but awoke on the ground in front of his wheelchair with increased back pain. Patient denies history of LOC, denies hitting head or head pain, denies aura prior to event. Patient was brought to [facility] and planned to be discharged but due to increased back pain refused to leave. Patient reports that pain is bearable after treatment with Toradol 30 and Oxy 5. Pain described as sharp 7/10 in the L$/L5 region with radiation down to the bilateral hips. Denies sensation or strength changes.     Recently seen by nephrology for CHITO on CKD who recommended performance of cystoscopy to alleviate R hydronephrosis. Patient was scheduled for procedure 08/14 but missed the appointment as he was admitted at [facility] for chest pain. No intervention was performed at that time. Patient was planned for left ureteroscopy w/endopyelotomy.     Patient is followed by ID for concerns for osteomyelitis in the spine. Dr. Jiménez notes that she reviewed MRI lumbar spine with Dr. Johnson (Neuroradiology) on 7/31 with concerns for osteomyelitis/discitis L4-L5 without change from 4/2023. There is no progression and would have expected progression of findings over 3 months if infectious but alternatively could be very indolent organism. Patient was planned for a dual isotope bone/gallium scan with SPECT CT to try to differentiate, but patient reports that he was not aware of this plan and did not have this performed.     In the Ed vitals are afebrile, Hr of 78, BP of 137/89, saturating 98% on RA.   Labs show no leukocytosis/anemia, thrombocytosis, normal CRP, stable CKD 3, protein gap, alcohol negative, cocaine on UDS. Previously noted to have hypothyroidism. In the Ed the patient was treated with Vancomycin and Zosyn, 2.3 L NS IVF, Toradol 30 mg IVP and Oxycodone 5 mg for pain relief.   CT IMPRESSION: 1.   Unchanged severe left hydronephrosis with marked cortical thinning. 2.   No renal or ureteral calculi. 3.   Copious stool throughout the colon, suggesting constipation. 4.   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   Mr. Healy is a 63 year old male with a past medical history of chronic back pain with concerns for L4/L5 osteomyelitis, alcohol abuse without withdrawal history who was brought in to [facility] after being found sleeping on the ground intoxicated, admitted for further management of possible osteomyelitis and hydronephrosis.     Patient reports that he drank about 6 glasses of leigh Camacho this morning, does not remember falling but awoke on the ground in front of his wheelchair with increased back pain. Patient denies history of LOC, denies hitting head or head pain, denies aura prior to event. Patient was brought to [facility] and planned to be discharged but due to increased back pain refused to leave. Patient reports that pain is bearable after treatment with Toradol 30 and Oxy 5. Pain described as sharp 7/10 in the L4/L5 region with radiation down to the bilateral hips. Denies sensation or strength changes.     Recently seen by nephrology for CHITO on CKD who recommended performance of cystoscopy to alleviate L hydronephrosis. Patient was scheduled for procedure 08/14 but missed the appointment as he was admitted at [facility] for chest pain. No intervention was performed at that time. Patient was planned for left ureteroscopy w/endopyelotomy.     Patient is followed by ID for concerns for osteomyelitis in the spine. Dr. Jiménez notes that she reviewed MRI lumbar spine with Dr. Johnson (Neuroradiology) on 7/31 with concerns for osteomyelitis/discitis L4-L5 without change from 4/2023. There is no progression and would have expected progression of findings over 3 months if infectious but alternatively could be very indolent organism. Patient was planned for a dual isotope bone/gallium scan with SPECT CT to try to differentiate, but patient reports that he was not aware of this plan and did not have this performed.     In the Ed vitals are afebrile, Hr of 78, BP of 137/89, saturating 98% on RA.   Labs show no leukocytosis/anemia, thrombocytosis, normal CRP, stable CKD 3, protein gap, alcohol negative, cocaine on UDS. Previously noted to have hypothyroidism. In the Ed the patient was treated with Vancomycin and Zosyn, 2.3 L NS IVF, Toradol 30 mg IVP and Oxycodone 5 mg for pain relief.   CT IMPRESSION: 1.   Unchanged severe left hydronephrosis with marked cortical thinning. 2.   No renal or ureteral calculi. 3.   Copious stool throughout the colon, suggesting constipation. 4.   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

## 2023-08-21 NOTE — PATIENT PROFILE ADULT - FALL HARM RISK - HARM RISK INTERVENTIONS
Assistance with ambulation/Assistance OOB with selected safe patient handling equipment/Communicate Risk of Fall with Harm to all staff/Discuss with provider need for PT consult/Monitor gait and stability/Provide patient with walking aids - walker, cane, crutches/Reinforce activity limits and safety measures with patient and family/Tailored Fall Risk Interventions/Visual Cue: Yellow wristband and red socks/Bed in lowest position, wheels locked, appropriate side rails in place/Call bell, personal items and telephone in reach/Instruct patient to call for assistance before getting out of bed or chair/Non-slip footwear when patient is out of bed/[place] to call system/Physically safe environment - no spills, clutter or unnecessary equipment/Purposeful Proactive Rounding/Room/bathroom lighting operational, light cord in reach Assistance with ambulation/Assistance OOB with selected safe patient handling equipment/Communicate Risk of Fall with Harm to all staff/Discuss with provider need for PT consult/Monitor gait and stability/Provide patient with walking aids - walker, cane, crutches/Reinforce activity limits and safety measures with patient and family/Tailored Fall Risk Interventions/Use of alarms - bed, chair and/or voice tab/Visual Cue: Yellow wristband and red socks/Bed in lowest position, wheels locked, appropriate side rails in place/Call bell, personal items and telephone in reach/Instruct patient to call for assistance before getting out of bed or chair/Non-slip footwear when patient is out of bed/[place] to call system/Physically safe environment - no spills, clutter or unnecessary equipment/Purposeful Proactive Rounding/Room/bathroom lighting operational, light cord in reach

## 2023-08-22 ENCOUNTER — TRANSCRIPTION ENCOUNTER (OUTPATIENT)
Age: 63
End: 2023-08-22

## 2023-08-22 LAB
-  STAPHYLOCOCCUS EPIDERMIDIS, METHICILLIN RESISTANT: SIGNIFICANT CHANGE UP
ALBUMIN SERPL ELPH-MCNC: 2.9 G/DL — LOW (ref 3.3–5)
ALP SERPL-CCNC: 59 U/L — SIGNIFICANT CHANGE UP (ref 40–120)
ALT FLD-CCNC: 6 U/L — LOW (ref 10–45)
ANION GAP SERPL CALC-SCNC: 5 MMOL/L — SIGNIFICANT CHANGE UP (ref 5–17)
AST SERPL-CCNC: 13 U/L — SIGNIFICANT CHANGE UP (ref 10–40)
BILIRUB SERPL-MCNC: <0.2 MG/DL — SIGNIFICANT CHANGE UP (ref 0.2–1.2)
BUN SERPL-MCNC: 23 MG/DL — SIGNIFICANT CHANGE UP (ref 7–23)
CALCIUM SERPL-MCNC: 8.3 MG/DL — LOW (ref 8.4–10.5)
CHLORIDE SERPL-SCNC: 109 MMOL/L — HIGH (ref 96–108)
CO2 SERPL-SCNC: 25 MMOL/L — SIGNIFICANT CHANGE UP (ref 22–31)
CREAT SERPL-MCNC: 1.74 MG/DL — HIGH (ref 0.5–1.3)
CULTURE RESULTS: SIGNIFICANT CHANGE UP
EGFR: 44 ML/MIN/1.73M2 — LOW
GLUCOSE SERPL-MCNC: 93 MG/DL — SIGNIFICANT CHANGE UP (ref 70–99)
GRAM STN FLD: SIGNIFICANT CHANGE UP
HCT VFR BLD CALC: 37.9 % — LOW (ref 39–50)
HGB BLD-MCNC: 12 G/DL — LOW (ref 13–17)
MAGNESIUM SERPL-MCNC: 2.1 MG/DL — SIGNIFICANT CHANGE UP (ref 1.6–2.6)
MCHC RBC-ENTMCNC: 27.3 PG — SIGNIFICANT CHANGE UP (ref 27–34)
MCHC RBC-ENTMCNC: 31.7 GM/DL — LOW (ref 32–36)
MCV RBC AUTO: 86.3 FL — SIGNIFICANT CHANGE UP (ref 80–100)
METHOD TYPE: SIGNIFICANT CHANGE UP
NRBC # BLD: 0 /100 WBCS — SIGNIFICANT CHANGE UP (ref 0–0)
PHOSPHATE SERPL-MCNC: 2.9 MG/DL — SIGNIFICANT CHANGE UP (ref 2.5–4.5)
PLATELET # BLD AUTO: 290 K/UL — SIGNIFICANT CHANGE UP (ref 150–400)
POTASSIUM SERPL-MCNC: 4 MMOL/L — SIGNIFICANT CHANGE UP (ref 3.5–5.3)
POTASSIUM SERPL-SCNC: 4 MMOL/L — SIGNIFICANT CHANGE UP (ref 3.5–5.3)
PROT SERPL-MCNC: 5.7 G/DL — LOW (ref 6–8.3)
RBC # BLD: 4.39 M/UL — SIGNIFICANT CHANGE UP (ref 4.2–5.8)
RBC # FLD: 16.6 % — HIGH (ref 10.3–14.5)
SODIUM SERPL-SCNC: 139 MMOL/L — SIGNIFICANT CHANGE UP (ref 135–145)
SPECIMEN SOURCE: SIGNIFICANT CHANGE UP
SPECIMEN SOURCE: SIGNIFICANT CHANGE UP
WBC # BLD: 4.53 K/UL — SIGNIFICANT CHANGE UP (ref 3.8–10.5)
WBC # FLD AUTO: 4.53 K/UL — SIGNIFICANT CHANGE UP (ref 3.8–10.5)

## 2023-08-22 PROCEDURE — 50432 PLMT NEPHROSTOMY CATHETER: CPT | Mod: LT

## 2023-08-22 PROCEDURE — 99222 1ST HOSP IP/OBS MODERATE 55: CPT | Mod: GC

## 2023-08-22 PROCEDURE — 99232 SBSQ HOSP IP/OBS MODERATE 35: CPT | Mod: GC

## 2023-08-22 RX ORDER — LIDOCAINE 4 G/100G
1 CREAM TOPICAL EVERY 24 HOURS
Refills: 0 | Status: DISCONTINUED | OUTPATIENT
Start: 2023-08-22 | End: 2023-09-02

## 2023-08-22 RX ORDER — TRAMADOL HYDROCHLORIDE 50 MG/1
25 TABLET ORAL EVERY 6 HOURS
Refills: 0 | Status: DISCONTINUED | OUTPATIENT
Start: 2023-08-22 | End: 2023-08-23

## 2023-08-22 RX ORDER — ACETAMINOPHEN 500 MG
650 TABLET ORAL EVERY 6 HOURS
Refills: 0 | Status: DISCONTINUED | OUTPATIENT
Start: 2023-08-22 | End: 2023-08-28

## 2023-08-22 RX ORDER — ACETAMINOPHEN 500 MG
650 TABLET ORAL EVERY 6 HOURS
Refills: 0 | Status: DISCONTINUED | OUTPATIENT
Start: 2023-08-22 | End: 2023-08-22

## 2023-08-22 RX ORDER — LEVOTHYROXINE SODIUM 125 MCG
150 TABLET ORAL DAILY
Refills: 0 | Status: DISCONTINUED | OUTPATIENT
Start: 2023-08-22 | End: 2023-09-02

## 2023-08-22 RX ADMIN — Medication 650 MILLIGRAM(S): at 17:39

## 2023-08-22 RX ADMIN — TRAMADOL HYDROCHLORIDE 25 MILLIGRAM(S): 50 TABLET ORAL at 23:44

## 2023-08-22 RX ADMIN — TRAMADOL HYDROCHLORIDE 25 MILLIGRAM(S): 50 TABLET ORAL at 17:39

## 2023-08-22 RX ADMIN — Medication 650 MILLIGRAM(S): at 18:48

## 2023-08-22 RX ADMIN — Medication 650 MILLIGRAM(S): at 15:27

## 2023-08-22 RX ADMIN — Medication 650 MILLIGRAM(S): at 14:27

## 2023-08-22 RX ADMIN — Medication 650 MILLIGRAM(S): at 23:44

## 2023-08-22 RX ADMIN — TRAMADOL HYDROCHLORIDE 25 MILLIGRAM(S): 50 TABLET ORAL at 18:48

## 2023-08-22 NOTE — CONSULT NOTE ADULT - ASSESSMENT
Assessment:  63M with a past medical history of chronic back pain with concerns for L4/L5 osteomyelitis, alcohol abuse without withdrawal history who was brought in to [facility] after being found sleeping on the ground intoxicated, admitted for further management of possible osteomyelitis and hydronephrosis. S/p attempted unsuccessful L URS on 8/21/23. Distal-mid ureter noted to be entirely obstructed vs obliterated with inability to pass wire or contrast retrograde beyond area of obstruction. Urology following, recommending nephrostomy placement. IR consulted for left PCN. Case reviewed with Dr. Spencer, plan for procedure with sedation.     Recommendations: Maintain NPO x 8 hours prior to procedure.     Communicated with: primary team

## 2023-08-22 NOTE — PROGRESS NOTE ADULT - PROBLEM SELECTOR PLAN 2
Unchanged severe left hydronephrosis with marked cortical thinning, Followed by Urology for this issue and outpatient planned for 08/14 but planned to be rescheduled with possible in hospital transition.     -Left ureteroscopy unsuccessful per Urology. Plan left PCN placement.   -IR consult for PCN placement (8/22)  - Urology following   - Monitor urine output.

## 2023-08-22 NOTE — CONSULT NOTE ADULT - SUBJECTIVE AND OBJECTIVE BOX
63M with a past medical history of chronic back pain with concerns for L4/L5 osteomyelitis, alcohol abuse without withdrawal history who was brought in to [facility] after being found sleeping on the ground intoxicated, admitted for further management of possible osteomyelitis and hydronephrosis. S/p attempted unsuccessful L URS on 8/21/23. Distal-mid ureter noted to be entirely obstructed vs obliterated with inability to pass wire or contrast retrograde beyond area of obstruction. Urology following, recommending nephrostomy placement. IR consulted for left PCN.     Clinical History: BACK PAIN    Handoff    MEWS Score    Arthritis    Thyroid disease    DJD (degenerative joint disease)    Chronic back pain    2019 novel coronavirus disease (COVID-19)    DJD (degenerative joint disease)    Chronic back pain    Hydronephrosis, left    Hydronephrosis, left    Chronic back pain    Back pain    Chronic back pain    Hydronephrosis, left    Fall    Alcohol abuse    Prophylactic measure    Stage 3 chronic kidney disease    Hypothyroidism    Cystoscopy with left ureteroscopy    No significant past surgical history    No pertinent past surgical history    History of kidney surgery    No pertinent past surgical history    BACK PAIN    38    [email or web address]        Meds:acetaminophen     Tablet .. 650 milliGRAM(s) Oral every 6 hours PRN  lidocaine   4% Patch 1 Patch Transdermal every 24 hours      Allergies:No Known Allergies        Labs:                           12.0   4.53  )-----------( 290      ( 22 Aug 2023 05:30 )             37.9     PT/INR - ( 21 Aug 2023 05:30 )   PT: 11.6 sec;   INR: 1.02          PTT - ( 21 Aug 2023 05:30 )  PTT:33.5 sec  08-22    139  |  109<H>  |  23  ----------------------------<  93  4.0   |  25  |  1.74<H>    Ca    8.3<L>      22 Aug 2023 05:30  Phos  2.9     08-22  Mg     2.1     08-22    TPro  5.7<L>  /  Alb  2.9<L>  /  TBili  <0.2  /  DBili  x   /  AST  13  /  ALT  6<L>  /  AlkPhos  59  08-22          Imaging Findings: reviewed, Severe chronic left hydronephrosis with only a thin rim of atrophic left renal parenchyma remaining.

## 2023-08-22 NOTE — PHYSICAL THERAPY INITIAL EVALUATION ADULT - PERTINENT HX OF CURRENT PROBLEM, REHAB EVAL
Mr. Healy is a 63 year old male with a past medical history of chronic back pain with concerns for L4/L5 osteomyelitis, alcohol abuse without withdrawal history who was brought in to [facility] after being found sleeping on the ground intoxicated, admitted for further management of possible osteomyelitis and hydronephrosis.     Patient reports that he drank about 6 glasses of leigh Camacho this morning, does not remember falling but awoke on the ground in front of his wheelchair with increased back pain. Patient denies history of LOC, denies hitting head or head pain, denies aura prior to event. Patient was brought to [facility] and planned to be discharged but due to increased back pain refused to leave. Patient reports that pain is bearable after treatment with Toradol 30 and Oxy 5. Pain described as sharp 7/10 in the L4/L5 region with radiation down to the bilateral hips. Denies sensation or strength changes.     Recently seen by nephrology for CHITO on CKD who recommended performance of cystoscopy to alleviate L hydronephrosis. Patient was scheduled for procedure 08/14 but missed the appointment as he was admitted at [facility] for chest pain. No intervention was performed at that time. Patient was planned for left ureteroscopy w/endopyelotomy.     Patient is followed by ID for concerns for osteomyelitis in the spine. Dr. Jiménez notes that she reviewed MRI lumbar spine with Dr. Johnson (Neuroradiology) on 7/31 with concerns for osteomyelitis/discitis L4-L5 without change from 4/2023. There is no progression and would have expected progression of findings over 3 months if infectious but alternatively could be very indolent organism. Patient was planned for a dual isotope bone/gallium scan with SPECT CT to try to differentiate, but patient reports that he was not aware of this plan and did not have this performed.

## 2023-08-22 NOTE — PROGRESS NOTE ADULT - SUBJECTIVE AND OBJECTIVE BOX
UROLOGY PROGRESS NOTE    SUBJECTIVE: Patient seen and examined bedside. Patient denies fevers/chills, abd/flank pain, straining to void, or incomplete emptying.        Vital Signs Last 24 Hrs  T(C): 36.7 (22 Aug 2023 06:07), Max: 37 (21 Aug 2023 20:55)  T(F): 98.1 (22 Aug 2023 06:07), Max: 98.6 (21 Aug 2023 20:55)  HR: 53 (22 Aug 2023 06:07) (52 - 71)  BP: 120/76 (22 Aug 2023 06:07) (108/74 - 146/99)  BP(mean): 98 (21 Aug 2023 17:56) (98 - 117)  RR: 18 (22 Aug 2023 06:07) (9 - 18)  SpO2: 100% (22 Aug 2023 06:07) (95% - 100%)    Parameters below as of 21 Aug 2023 20:55  Patient On (Oxygen Delivery Method): room air      I&O's Detail    21 Aug 2023 07:01  -  22 Aug 2023 07:00  --------------------------------------------------------  IN:  Total IN: 0 mL    OUT:    Voided (mL): 150 mL  Total OUT: 150 mL    Total NET: -150 mL          PHYSICAL EXAM    General: NAD, resting comfortably in bed  C/V: NSR  Pulm: Nonlabored breathing, no respiratory distress on room air  Abd: soft, ND/NT, no rebound tenderness, no guarding  : No CVAT bilaterally  Extrem: WWP, no edema, SCDs in place        LABS:                        12.0   4.53  )-----------( 290      ( 22 Aug 2023 05:30 )             37.9     08-22    139  |  109<H>  |  23  ----------------------------<  93  4.0   |  25  |  1.74<H>    Ca    8.3<L>      22 Aug 2023 05:30  Phos  2.9     08-22  Mg     2.1     08-22    TPro  5.7<L>  /  Alb  2.9<L>  /  TBili  <0.2  /  DBili  x   /  AST  13  /  ALT  6<L>  /  AlkPhos  59  08-22    PT/INR - ( 21 Aug 2023 05:30 )   PT: 11.6 sec;   INR: 1.02          PTT - ( 21 Aug 2023 05:30 )  PTT:33.5 sec  Urinalysis Basic - ( 22 Aug 2023 05:30 )    Color: x / Appearance: x / SG: x / pH: x  Gluc: 93 mg/dL / Ketone: x  / Bili: x / Urobili: x   Blood: x / Protein: x / Nitrite: x   Leuk Esterase: x / RBC: x / WBC x   Sq Epi: x / Non Sq Epi: x / Bacteria: x        CULTURES:      Culture - Blood (collected 08-20-23 @ 20:22)  Source: .Blood Blood-Peripheral  Preliminary Report (08-22-23 @ 07:02):    No growth at 24 hours    Culture - Blood (collected 08-20-23 @ 20:22)  Source: .Blood Blood-Peripheral  Gram Stain (08-22-23 @ 05:50):    Growth in aerobic bottle: Gram Positive Cocci in Clusters  Preliminary Report (08-22-23 @ 05:51):    Growth in aerobic bottle: Gram Positive Cocci in Clusters    Direct identification is available within approximately 3-5    hours either by Blood Panel Multiplexed PCR or Direct    MALDI-TOF. Details: [email or web address]  Organism: Blood Culture PCR (08-22-23 @ 06:51)  Organism: Blood Culture PCR (08-22-23 @ 06:51)      Method Type: PCR      -  Staphylococcus epidermidis, Methicillin resistant: Detec        Culture - Urine (collected 08-20-23 @ 20:22)  Source: Clean Catch Clean Catch (Midstream)  Final Report (08-22-23 @ 07:22):    <10,000 CFU/mL Normal Urogenital Irena        RADIOLOGY & ADDITIONAL STUDIES:

## 2023-08-22 NOTE — DISCHARGE NOTE PROVIDER - NSDCCPTREATMENT_GEN_ALL_CORE_FT
PRINCIPAL PROCEDURE  Procedure: CT abdomen pelvis  Findings and Treatment: 1. Severe chronic left hydronephrosis with only a thin rim of atrophic   left renal parenchyma remaining. This has been present since the first   imaging study of 4/13/2023. The left ureter is obstructed in its   midportion at an area of scarring in the left retroperitoneum. There is   likely minimal residual left renal function.  2. No significant change in the appearance of the spine, with sequela of   discitis/osteomyelitis at L4-5 and severe spondylolisthesis and   degenerative changes at L5-S1.

## 2023-08-22 NOTE — PHYSICAL THERAPY INITIAL EVALUATION ADULT - ADDITIONAL COMMENTS
Pt reports to living in an Assisted living facility. Pt reports to be mostly indpt with all ADLs and IADLs. Pt states that he had been ambulating Rollator at baseline.

## 2023-08-22 NOTE — DISCHARGE NOTE PROVIDER - ATTENDING DISCHARGE PHYSICAL EXAMINATION:
Chronic LBP - being evaluated outpatient for chronic OM vs discitis vs degenerative disease, bone biopsy negative and pending Bone-Gallium SPECT CT, outpatient follow up with ID. Pain resolved.  Severe L hydronephrosis - s/p PCN with IR 8/22. Will need continued outpatient follow up with urology.  Hypothyroidism - patient intermittently refuses medications, will need outpatient follow up for continued monitoring.

## 2023-08-22 NOTE — DISCHARGE NOTE PROVIDER - NSDCFUADDAPPT_GEN_ALL_CORE_FT
Please follow up with your Infectious disease specialist  on 08/29/2023.     Please follow up with your Nephrologist  on 08/25/2023.  Please follow up with your Infectious disease specialist  on 08/29/2023.     Please follow up with your Nephrologist Dr. Aldana    Urology will call you to follow up with an appointment.

## 2023-08-22 NOTE — PROGRESS NOTE ADULT - ASSESSMENT
62 y/o male s/p attempted unsuccessful L URS on 8/21/23. Distal-mid ureter noted to be entirely obstructed vs obliterated with inability to pass wire or contrast retrograde beyond area of obstruction.     Recommendations:  -IR consult for L PCN  -NPO for procedure  -Appreciate excellent care per primary team  -Discussed with attending

## 2023-08-22 NOTE — PROGRESS NOTE ADULT - SUBJECTIVE AND OBJECTIVE BOX
OVERNIGHT EVENTS: ALIRIO    SUBJECTIVE / INTERVAL HPI: Patient seen and examined at bedside.     VITAL SIGNS:  Vital Signs Last 24 Hrs  T(C): 36.7 (22 Aug 2023 06:07), Max: 37 (21 Aug 2023 20:55)  T(F): 98.1 (22 Aug 2023 06:07), Max: 98.6 (21 Aug 2023 20:55)  HR: 53 (22 Aug 2023 06:07) (52 - 71)  BP: 120/76 (22 Aug 2023 06:07) (108/74 - 146/99)  BP(mean): 98 (21 Aug 2023 17:56) (98 - 117)  RR: 18 (22 Aug 2023 06:07) (9 - 18)  SpO2: 100% (22 Aug 2023 06:07) (95% - 100%)    Parameters below as of 21 Aug 2023 20:55  Patient On (Oxygen Delivery Method): room air        PHYSICAL EXAM:    General: NAD  HEENT: NC/AT; PERRL, anicteric sclera; MMM  Neck: supple w/o palpable nodularity  Cardiovascular: +S1/S2; RRR  Respiratory: CTA B/L; no W/R/R  Gastrointestinal: soft, NT/ND; +BSx4  Extremities: WWP; no edema, clubbing or cyanosis  Vascular: 2+ radial, DP/PT pulses B/L  Neurological: AAOx3; no focal deficits    MEDICATIONS:  MEDICATIONS  (STANDING):    MEDICATIONS  (PRN):      ALLERGIES:  Allergies    No Known Allergies    Intolerances        LABS:                        12.0   4.04  )-----------( 273      ( 21 Aug 2023 05:30 )             37.8     08-21    139  |  109<H>  |  25<H>  ----------------------------<  100<H>  3.4<L>   |  23  |  1.38<H>    Ca    7.9<L>      21 Aug 2023 05:30  Phos  2.7     08-21  Mg     2.2     08-21    TPro  8.5<H>  /  Alb  3.7  /  TBili  0.5  /  DBili  x   /  AST  38<H>  /  ALT  19  /  AlkPhos  83  08-20    PT/INR - ( 21 Aug 2023 05:30 )   PT: 11.6 sec;   INR: 1.02          PTT - ( 21 Aug 2023 05:30 )  PTT:33.5 sec  Urinalysis Basic - ( 21 Aug 2023 05:30 )    Color: x / Appearance: x / SG: x / pH: x  Gluc: 100 mg/dL / Ketone: x  / Bili: x / Urobili: x   Blood: x / Protein: x / Nitrite: x   Leuk Esterase: x / RBC: x / WBC x   Sq Epi: x / Non Sq Epi: x / Bacteria: x      CAPILLARY BLOOD GLUCOSE      POCT Blood Glucose.: 101 mg/dL (20 Aug 2023 14:50)      RADIOLOGY & ADDITIONAL TESTS: Reviewed.

## 2023-08-22 NOTE — DISCHARGE NOTE PROVIDER - NSDCFUSCHEDAPPT_GEN_ALL_CORE_FT
Lisandro Aldana  [facility]  NEPHRO [address]  Scheduled Appointment: 08/25/2023    Julieta Jiménez  [facility]  INFDISEASE [address]  Scheduled Appointment: 08/29/2023     [facility]  INTMED [address]  Scheduled Appointment: 09/11/2023

## 2023-08-22 NOTE — PROGRESS NOTE ADULT - PROBLEM SELECTOR PLAN 3
Patient underwent a fall after drinking alcohol today, similar to presentation in April. At that time CT head was negative, patient denies head trauma or head pain at this time but is unable to remember the event. AOX3 at this time.     - PT   - Would defer head CT at this time given no evidence of acute injury, neurologic deficit, or blood thinners.

## 2023-08-22 NOTE — DISCHARGE NOTE PROVIDER - NSDCCPCAREPLAN_GEN_ALL_CORE_FT
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  You previously underwent biopsy that did not yield bacterial data. Your lab work does not currently show any signs of active infections.   Please follow up with yout infectious doctor outpatient for the isotope bone/gallium scan with SPECT CT as planned with them. Your appointment is already set for 8/29/2023.         SECONDARY DISCHARGE DIAGNOSES  Diagnosis: Hydronephrosis, left  Assessment and Plan of Treatment: You have a known history of hydronephrosis of your left kidney. Sometimes, the kidneys become swollen and filled with fluid. This is hydronephrosis. The tubes that drain the urine from the kidney to the bladder can also stretch and get wider. This happens because the pathway of fluids in or out of the kidney is blocked or damaged. Urology was consulted and they tried to do a procedure called ureterscopy during your admission but was not able to. Interventional radiologists were then consulted to place a tube called percutanous nephrostomy tube in your left kidney to help drain the fluid.   Please be sure to follow up with Urology outpatient as well as your PCP.    Diagnosis: Stage 3 chronic kidney disease  Assessment and Plan of Treatment: Please be sure to monitor your labs and kidney function by following up with your PCP.    Diagnosis: Hypothyroidism  Assessment and Plan of Treatment: You have a known history og hypothyroidism. Your TSH which a hormone used to track your thyroid function was high during this admission. Endocrinology was consulted and they recommended to take your levthyroxine medication _______.  Please take levothyroxine in the empty stomach in the morning at least one hour before other medications and two hours before breatkfast.  Please be sure to follow up with your PCP.     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  You previously underwent biopsy that did not yield bacterial data. Your lab work does not currently show any signs of active infections.   Please follow up with yout infectious doctor outpatient for the isotope bone/gallium scan with SPECT CT as planned with them. Your appointment is already set for 8/29/2023.         SECONDARY DISCHARGE DIAGNOSES  Diagnosis: Hydronephrosis, left  Assessment and Plan of Treatment: You have a known history of hydronephrosis of your left kidney. Sometimes, the kidneys become swollen and filled with fluid. This is hydronephrosis. The tubes that drain the urine from the kidney to the bladder can also stretch and get wider. This happens because the pathway of fluids in or out of the kidney is blocked or damaged. Urology was consulted and they tried to do a procedure called ureterscopy during your admission but was not able to. Interventional radiologists were then consulted to place a tube called percutanous nephrostomy tube in your left kidney to help drain the fluid.   Please be sure to follow up with Urology outpatient as well as your PCP.    Diagnosis: Stage 3 chronic kidney disease  Assessment and Plan of Treatment: Please be sure to monitor your labs and kidney function by following up with your PCP.    Diagnosis: Hypothyroidism  Assessment and Plan of Treatment: You have a known history og hypothyroidism. Your TSH which a hormone used to track your thyroid function was high during this admission.Please continue to take your Synthroid 150 mcg medication daily.   Please take it on an empty stomach in the morning at least one hour before other medications and two hours before breatkfast.  Please be sure to follow up with your PCP.     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  You previously underwent biopsy that did not yield bacterial data. Your lab work does not currently show any signs of active infections.   Please follow up with yout infectious doctor outpatient for the isotope bone/gallium scan with SPECT CT as planned with them. Your appointment is already set for 8/29/2023.   Please continue to take a lidocaine patch as needed which can be placed at site of pain for 12 hours within a 24 hour period. You can also continue your gabapentin 200 mg every 8 hours and acetaminophen at 975 mg every 8 hours as needed too.         SECONDARY DISCHARGE DIAGNOSES  Diagnosis: Hydronephrosis, left  Assessment and Plan of Treatment: You have a known history of hydronephrosis of your left kidney. Sometimes, the kidneys become swollen and filled with fluid. This is hydronephrosis. The tubes that drain the urine from the kidney to the bladder can also stretch and get wider. This happens because the pathway of fluids in or out of the kidney is blocked or damaged. Urology was consulted and they tried to do a procedure called ureterscopy during your admission but was not able to. Interventional radiologists were then consulted to place a tube called percutanous nephrostomy tube in your left kidney to help drain the fluid.   Please be sure to follow up with a Urologist (they will give you a call to set up an appointment) as well as wtih your PCP.    Diagnosis: Stage 3 chronic kidney disease  Assessment and Plan of Treatment: Please be sure to monitor your labs and kidney function by following up with your PCP as well as your nephrologist.    Diagnosis: Hypothyroidism  Assessment and Plan of Treatment: You have a known history og hypothyroidism. Your TSH which a hormone used to track your thyroid function was high during this admission.Please continue to take your Synthroid 150 mcg medication daily.   Please take it on an empty stomach in the morning at least one hour before other medications and two hours before breatkfast.  Please be sure to follow up with your PCP.    Diagnosis: Alcohol abuse  Assessment and Plan of Treatment: Please continue to take you folic acid and thiamine supplementation daily

## 2023-08-22 NOTE — PROGRESS NOTE ADULT - PROBLEM SELECTOR PLAN 7
F: 2.3L NS.   E: Replete PRN.   N: Regular diet.   DVT ppx: Holding pending possible procedure.   Dispo:

## 2023-08-22 NOTE — PROGRESS NOTE ADULT - PROBLEM SELECTOR PLAN 1
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 Patient underwent biopsy that did not yield bacterial data and refused discharge to [facility] for IV antibiotics while this was worked up. Patient with consistently low ESR/CRP/leukocytosis and no fevers.   Repeat MR lumbar also showing OM vs discitis, though no progression of disease. History of back injections for pain management. Planned for dual isotope bone/gallium scan with SPECT CT with ID for differentiation.   Bx Culture: Gram positive cocci in clusters.   Plan:  -F/U sensitivities   - Likely follow up outpatient for dual isotope bone/gallium scan with SPECT CT and work up for back pain.   - Would hold antibiotics at this time given prolonged duration off of antibiotic therapy without progression  - Continue home gabapentin dose.

## 2023-08-22 NOTE — DISCHARGE NOTE PROVIDER - NSDCMRMEDTOKEN_GEN_ALL_CORE_FT
folic acid 1 mg oral tablet: 1 tab(s) orally once a day  gabapentin 100 mg oral capsule: 2 orally 3 times a day  levothyroxine 150 mcg (0.15 mg) oral capsule: 1 orally once a day  Multiple Vitamins oral tablet: 1 tab(s) orally once a day  thiamine 100 mg oral tablet: 1 tab(s) orally once a day  traMADol 50 mg oral tablet: 0.5 orally 3 times a day   acetaminophen 325 mg oral tablet: 3 tab(s) orally every 8 hours as needed for  mild pain  folic acid 1 mg oral tablet: 1 tab(s) orally once a day  gabapentin 100 mg oral capsule: 2 cap(s) orally 3 times a day  levothyroxine 150 mcg (0.15 mg) oral capsule: 1 orally once a day  lidocaine 4% topical film: Apply topically to affected area every 24 hours Place at site for 12 hours  melatonin 3 mg oral tablet: 1 tab(s) orally once a day (at bedtime)  Multiple Vitamins oral tablet: 1 tab(s) orally once a day  ondansetron 4 mg oral tablet: 1 tab(s) orally every 8 hours As needed Nausea and/or Vomiting  thiamine 100 mg oral tablet: 1 tab(s) orally once a day

## 2023-08-22 NOTE — CONSULT NOTE ADULT - SUBJECTIVE AND OBJECTIVE BOX
HISTORY OF PRESENT ILLNESS  REJI BLAKE is a 63y Male with a past medical history of chronic back pain, extensive alcohol use disorder was brought to [facility] after being found down on the ground, admitted to medicine service on 08/21 for concern of osteomyelitis and etoh intoxication.       Patient has been reportedly  for CHITO on CKD who recommended performance of cystoscopy to alleviate L hydronephrosis. Patient was scheduled for procedure 08/14 but missed the appointment as he was admitted at [facility] for chest pain. No intervention was performed at that time. Patient was planned for left ureteroscopy w/endopyelotomy.     Patient is followed by ID for concerns for osteomyelitis in the spine. Dr. Jiménez notes that she reviewed MRI lumbar spine with Dr. Johnson (Neuroradiology) on 7/31 with concerns for osteomyelitis/discitis L4-L5 without change from 4/2023. There is no progression and would have expected progression of findings over 3 months if infectious but alternatively could be very indolent organism. Patient was planned for a dual isotope bone/gallium scan with SPECT CT to try to differentiate, but patient reports that he was not aware of this plan and did not have this performed.     Endocrinology has been consulted for Management of Hypothyroidism.    Age of diagnosis:  S/s at time of diagnosis:  Last TFTs were checked in:  Current Therapy:  Outpatient managment by:    FAMILY HISTORY  - Diabetes:  - Thyroid:  - Autoimmune:  - Other:    SOCIAL HISTORY  - Work:  - Alcohol: hard liquor   - Smoking:  - Recreational Drugs:    ALLERGIES  No Known Allergies      CURRENT MEDICATIONS  acetaminophen     Tablet .. 650 milliGRAM(s) Oral every 6 hours PRN  lidocaine   4% Patch 1 Patch Transdermal every 24 hours      REVIEW OF SYSTEMS  Constitutional:  Negative fever, chills or loss of appetite.  Eyes:  Negative blurry vision or double vision.  Cardiovascular:  Negative for chest pain or palpitations.  Respiratory:  Negative for cough, wheezing, or shortness of breath.   Gastrointestinal:  Negative for nausea, vomiting, diarrhea, constipation, or abdominal pain.  Genitourinary:  Negative frequency, urgency or dysuria.  Neurologic:  No headache, confusion, dizziness, lightheadedness.    PHYSICAL EXAM  Vital Signs Last 24 Hrs  T(C): 36.7 (22 Aug 2023 06:07), Max: 37 (21 Aug 2023 20:55)  T(F): 98.1 (22 Aug 2023 06:07), Max: 98.6 (21 Aug 2023 20:55)  HR: 53 (22 Aug 2023 06:07) (52 - 71)  BP: 120/76 (22 Aug 2023 06:07) (108/74 - 146/99)  BP(mean): 98 (21 Aug 2023 17:56) (98 - 117)  RR: 18 (22 Aug 2023 06:07) (9 - 18)  SpO2: 100% (22 Aug 2023 06:07) (95% - 100%)    Parameters below as of 21 Aug 2023 20:55  Patient On (Oxygen Delivery Method): room air    Constitutional: Awake, alert, in no acute distress.   HEENT: Normocephalic, atraumatic, ZENOBIA, no proptosis or lid retraction.   Neck: supple, no acanthosis, no thyromegaly or palpable thyroid nodules.  Respiratory: Lungs clear to ausculation bilaterally.   Cardiovascular: regular rhythm, normal S1 and S2, no audible murmurs.   GI: soft, non-tender, non-distended, bowel sounds present, no masses appreciated.  Extremities: No lower extremity edema, peripheral pulses present.   Skin: no rashes.   Psychiatric: AAO x 3. Normal affect/mood.     LABS  CBC - WBC/HGB/HTC/PLT: 4.53/12.0/37.9/290 (08-22-23)  BMP: Na/K/Cl/Bicarb/BUN/Cr/Gluc: 139/4.0/109/25/23/1.74/93 (08-22-23)  Anion Gap: 5 (08-22-23)  eGFR: 44 (08-22-23)  Calcium: 8.3 (08-22-23)  Phosphorus: 2.9 (08-22-23)  Magnesium: 2.1 (08-22-23)  LFT - Alb/Tprot/Tbili/Dbili/AlkPhos/ALT/AST: 2.9/--/<0.2/--/59/6/13 (08-22-23)  PT/aPTT/INR: 11.6/33.5/1.02 (08-21-23)  Thyroid Stimulating Hormone, Serum: 26.130 (08-21-23)  Total T4/Free T4: 2.52/<0.378 (08-21-23)  CBC - WBC/HGB/HTC/PLT: 4.53/12.0/37.9/290 (08-22-23)BMP: Na/K/Cl/Bicarb/BUN/Cr/Gluc: 139/4.0/109/25/23/1.74/93 (08-22-23)  Anion Gap: 5 (08-22-23)  eGFR: 44 (08-22-23)  Calcium: 8.3 (08-22-23)  Phosphorus: 2.9 (08-22-23)  Magnesium: 2.1 (08-22-23)    CAPILLARY BLOOD GLUCOSE & INSULIN RECEIVED        08-21-23 @ 07:01  -  08-22-23 @ 07:00  --------------------------------------------------------  IN: 0 mL / OUT: 150 mL / NET: -150 mL        ASSESSMENT / RECOMMENDATIONS    A1C: BUN: 23  Creatinine: 1.74  GFR: 44  Weight: 85  BMI: 26.9  EF:     # Hypothyroidism  - Last TFTs:   - please start Levothyroxine:    - Please take levothyroxine in the empty stomach in the morning at least one hour before other medications and two hours before breatkfast.  - Discharge recommendations to be discussed.   - Patient can follow up at discharge with [facility] by calling [phone] to make an appointment.      Case discussed with Dr. Marsh. Primary team updated.       Samia Paul  Endocrinology Fellow    Service Pager: [phone]  HISTORY OF PRESENT ILLNESS  REJI BLAKE is a 63y Male with a past medical history of chronic back pain, hypothyroidism, extensive alcohol use disorder was brought to [facility] after being found down on the ground, admitted to medicine service on 08/21 for concern of osteomyelitis and etoh intoxication.       Pt has a long standing of left hydronephrosis and was planned for outpatient left ureteroscopy with endopyelotomy which patient missed the appointment.   Patient again had severe chronic left hydronephrosis during this admission.  Urology was consulted and patient underwent Cystoscopy with left ureteroscopy - report indicates L mid to distal ureter obliterated unable to be cannulated with wire, retrograde pyelogram without retrograde passage of contrast.  Given patient worsening renal function and degree of L hydronephrosis on CT A/P, urology had suggested that patient will require L PCN for urinary diversion.     Patient is followed by ID for concerns for chronic osteomyelitis in the spine.  Patient was admitted for fall in 04/2023 and found to have osteomyelitis/diskitis, where bone biopsy was obtained and dc'ed with IV antibiotics. Dr. Jiménez notes that she reviewed MRI lumbar spine with Dr. Johnson (Neuroradiology) on 7/31 with concerns for osteomyelitis/discitis L4-L5 without change from 4/2023. There is no progression and would have expected progression of findings over 3 months if infectious but alternatively could be very indolent organism. Patient was planned for a dual isotope bone/gallium scan with SPECT CT to try to differentiate which patient had not followed-up with    On admission, patient was afebrile and hemodynamically stable.   Labs revealed Na 136, K 3.7, Cr 1.66, Glucose 121, BUN 30, Cr 1.66, etoh level <3.  TSH was 26.13 and Free T4 <0.378 on 08/21.  Patient was discharged with Home dose synthroid 112mcg.  During admission in 04/2023, TSH was 8.38 and Free T4 was 0.744.      Endocrinology has been consulted for Management of Hypothyroidism.      Age of diagnosis:  S/s at time of diagnosis:  Last TFTs were checked in:  Current Therapy:  Outpatient managment by:    FAMILY HISTORY  - Diabetes:  - Thyroid:  - Autoimmune:  - Other:    SOCIAL HISTORY  - Work:  - Alcohol: hard liquor   - Smoking:  - Recreational Drugs:    ALLERGIES  No Known Allergies      CURRENT MEDICATIONS  acetaminophen     Tablet .. 650 milliGRAM(s) Oral every 6 hours PRN  lidocaine   4% Patch 1 Patch Transdermal every 24 hours      REVIEW OF SYSTEMS  Constitutional:  Negative fever, chills or loss of appetite.  Eyes:  Negative blurry vision or double vision.  Cardiovascular:  Negative for chest pain or palpitations.  Respiratory:  Negative for cough, wheezing, or shortness of breath.   Gastrointestinal:  Negative for nausea, vomiting, diarrhea, constipation, or abdominal pain.  Genitourinary:  Negative frequency, urgency or dysuria.  Neurologic:  No headache, confusion, dizziness, lightheadedness.    PHYSICAL EXAM  Vital Signs Last 24 Hrs  T(C): 36.7 (22 Aug 2023 06:07), Max: 37 (21 Aug 2023 20:55)  T(F): 98.1 (22 Aug 2023 06:07), Max: 98.6 (21 Aug 2023 20:55)  HR: 53 (22 Aug 2023 06:07) (52 - 71)  BP: 120/76 (22 Aug 2023 06:07) (108/74 - 146/99)  BP(mean): 98 (21 Aug 2023 17:56) (98 - 117)  RR: 18 (22 Aug 2023 06:07) (9 - 18)  SpO2: 100% (22 Aug 2023 06:07) (95% - 100%)    Parameters below as of 21 Aug 2023 20:55  Patient On (Oxygen Delivery Method): room air    Constitutional: Awake, alert, in no acute distress.   HEENT: Normocephalic, atraumatic, ZENOBIA, no proptosis or lid retraction.   Neck: supple, no acanthosis, no thyromegaly or palpable thyroid nodules.  Respiratory: Lungs clear to ausculation bilaterally.   Cardiovascular: regular rhythm, normal S1 and S2, no audible murmurs.   GI: soft, non-tender, non-distended, bowel sounds present, no masses appreciated.  Extremities: No lower extremity edema, peripheral pulses present.   Skin: no rashes.   Psychiatric: AAO x 3. Normal affect/mood.     LABS  CBC - WBC/HGB/HTC/PLT: 4.53/12.0/37.9/290 (08-22-23)  BMP: Na/K/Cl/Bicarb/BUN/Cr/Gluc: 139/4.0/109/25/23/1.74/93 (08-22-23)  Anion Gap: 5 (08-22-23)  eGFR: 44 (08-22-23)  Calcium: 8.3 (08-22-23)  Phosphorus: 2.9 (08-22-23)  Magnesium: 2.1 (08-22-23)  LFT - Alb/Tprot/Tbili/Dbili/AlkPhos/ALT/AST: 2.9/--/<0.2/--/59/6/13 (08-22-23)  PT/aPTT/INR: 11.6/33.5/1.02 (08-21-23)  Thyroid Stimulating Hormone, Serum: 26.130 (08-21-23)  Total T4/Free T4: 2.52/<0.378 (08-21-23)  CBC - WBC/HGB/HTC/PLT: 4.53/12.0/37.9/290 (08-22-23)BMP: Na/K/Cl/Bicarb/BUN/Cr/Gluc: 139/4.0/109/25/23/1.74/93 (08-22-23)  Anion Gap: 5 (08-22-23)  eGFR: 44 (08-22-23)  Calcium: 8.3 (08-22-23)  Phosphorus: 2.9 (08-22-23)  Magnesium: 2.1 (08-22-23)    CAPILLARY BLOOD GLUCOSE & INSULIN RECEIVED        08-21-23 @ 07:01  -  08-22-23 @ 07:00  --------------------------------------------------------  IN: 0 mL / OUT: 150 mL / NET: -150 mL        ASSESSMENT / RECOMMENDATIONS    REJI BLAKE is a 63y Male with a past medical history of chronic back pain, hypothyroidism, extensive alcohol use disorder was brought to [facility] after being found down on the ground, admitted to medicine service on 08/21 for concern of osteomyelitis and etoh intoxication.       Pt has a long standing of left hydronephrosis and was planned for outpatient left ureteroscopy with endopyelotomy which patient missed the appointment.   Patient again had severe chronic left hydronephrosis during this admission.  Urology was consulted and patient underwent Cystoscopy with left ureteroscopy - report indicates L mid to distal ureter obliterated unable to be cannulated with wire, retrograde pyelogram without retrograde passage of contrast.  Given patient worsening renal function and degree of L hydronephrosis on CT A/P, urology had suggested that patient will require L PCN for urinary diversion.     Patient is followed by ID for concerns for chronic osteomyelitis in the spine.  Patient was admitted for fall in 04/2023 and found to have osteomyelitis/diskitis, where bone biopsy was obtained and dc'ed with IV antibiotics. Dr. Jiménez notes that she reviewed MRI lumbar spine with Dr. Johnson (Neuroradiology) on 7/31 with concerns for osteomyelitis/discitis L4-L5 without change from 4/2023. There is no progression and would have expected progression of findings over 3 months if infectious but alternatively could be very indolent organism. Patient was planned for a dual isotope bone/gallium scan with SPECT CT to try to differentiate which patient had not followed-up with    On admission, patient was afebrile and hemodynamically stable.   Labs revealed Na 136, K 3.7, Cr 1.66, Glucose 121, BUN 30, Cr 1.66, etoh level <3.  TSH was 26.13 and Free T4 <0.378 on 08/21.  Patient was discharged with Home dose synthroid 112mcg.    Endocrinology has been consulted for Management of Hypothyroidism.  During admission in 04/2023, TSH was 8.38 and Free T4 was 0.744.      A1C: BUN: 23  Creatinine: 1.74  GFR: 44  Weight: 85  BMI: 26.9      # Hypothyroidism  TSH was 26.13 and Free T4 <0.378 on 08/21 04/2023, TSH was 8.38 and Free T4 was 0.744.    - please start Levothyroxine:    - Please take levothyroxine in the empty stomach in the morning at least one hour before other medications and two hours before breatkfast.  - Discharge recommendations to be discussed.   - Patient can follow up at discharge with [facility] by calling [phone] to make an appointment.      Case discussed with Dr. Marsh. Primary team updated.       Samia Paul  Endocrinology Fellow    Service Pager: [phone]  HISTORY OF PRESENT ILLNESS  REJI BLAKE is a 63y Male with a past medical history of chronic back pain, hypothyroidism, extensive alcohol use disorder was brought to [facility] after being found down on the ground, admitted to medicine service on 08/21 for concern of osteomyelitis and etoh intoxication.       Pt has a long standing of left hydronephrosis and was planned for outpatient left ureteroscopy with endopyelotomy which patient missed the appointment.   Patient again had severe chronic left hydronephrosis during this admission.  Urology was consulted and patient underwent Cystoscopy with left ureteroscopy - S/p attempted unsuccessful L URS on 8/21/23. Distal-mid ureter noted to be entirely obstructed vs obliterated with inability to pass wire or contrast retrograde beyond area of obstruction.  Pt has now been taken to IR for left nephrostomy tube placement.    Patient is followed by ID for concerns for chronic osteomyelitis in the spine.  Patient was admitted for fall in 04/2023 and found to have osteomyelitis/diskitis, where bone biopsy was obtained and dc'ed with IV antibiotics. Dr. Jiménez notes that she reviewed MRI lumbar spine with Dr. Johnson (Neuroradiology) on 7/31 with concerns for osteomyelitis/discitis L4-L5 without change from 4/2023. There is no progression and would have expected progression of findings over 3 months if infectious but alternatively could be very indolent organism. Patient was planned for a dual isotope bone/gallium scan with SPECT CT to try to differentiate which patient had not followed-up with    On admission, patient was afebrile and hemodynamically stable.   Labs revealed Na 136, K 3.7, Cr 1.66, Glucose 121, BUN 30, Cr 1.66, etoh level <3.  TSH was 26.13 and Free T4 <0.378 on 08/21.  Patient was discharged with Home dose synthroid 112mcg.  During admission in 04/2023, TSH was 8.38 and Free T4 was 0.744.      Endocrinology has been consulted for Management of Hypothyroidism.      Age of diagnosis:  S/s at time of diagnosis:  Last TFTs were checked in:  Current Therapy:  Outpatient managment by:    FAMILY HISTORY  - Diabetes:  - Thyroid:  - Autoimmune:  - Other:    SOCIAL HISTORY  - Work:  - Alcohol: hard liquor   - Smoking:  - Recreational Drugs:    ALLERGIES  No Known Allergies      CURRENT MEDICATIONS  acetaminophen     Tablet .. 650 milliGRAM(s) Oral every 6 hours PRN  lidocaine   4% Patch 1 Patch Transdermal every 24 hours      REVIEW OF SYSTEMS  Constitutional:  Negative fever, chills or loss of appetite.  Eyes:  Negative blurry vision or double vision.  Cardiovascular:  Negative for chest pain or palpitations.  Respiratory:  Negative for cough, wheezing, or shortness of breath.   Gastrointestinal:  Negative for nausea, vomiting, diarrhea, constipation, or abdominal pain.  Genitourinary:  Negative frequency, urgency or dysuria.  Neurologic:  No headache, confusion, dizziness, lightheadedness.    PHYSICAL EXAM  Vital Signs Last 24 Hrs  T(C): 36.7 (22 Aug 2023 06:07), Max: 37 (21 Aug 2023 20:55)  T(F): 98.1 (22 Aug 2023 06:07), Max: 98.6 (21 Aug 2023 20:55)  HR: 53 (22 Aug 2023 06:07) (52 - 71)  BP: 120/76 (22 Aug 2023 06:07) (108/74 - 146/99)  BP(mean): 98 (21 Aug 2023 17:56) (98 - 117)  RR: 18 (22 Aug 2023 06:07) (9 - 18)  SpO2: 100% (22 Aug 2023 06:07) (95% - 100%)    Parameters below as of 21 Aug 2023 20:55  Patient On (Oxygen Delivery Method): room air    Constitutional: Awake, alert, in no acute distress.   HEENT: Normocephalic, atraumatic, ZENOBIA, no proptosis or lid retraction.   Neck: supple, no acanthosis, no thyromegaly or palpable thyroid nodules.  Respiratory: Lungs clear to ausculation bilaterally.   Cardiovascular: regular rhythm, normal S1 and S2, no audible murmurs.   GI: soft, non-tender, non-distended, bowel sounds present, no masses appreciated.  Extremities: No lower extremity edema, peripheral pulses present.   Skin: no rashes.   Psychiatric: AAO x 3. Normal affect/mood.     LABS  CBC - WBC/HGB/HTC/PLT: 4.53/12.0/37.9/290 (08-22-23)  BMP: Na/K/Cl/Bicarb/BUN/Cr/Gluc: 139/4.0/109/25/23/1.74/93 (08-22-23)  Anion Gap: 5 (08-22-23)  eGFR: 44 (08-22-23)  Calcium: 8.3 (08-22-23)  Phosphorus: 2.9 (08-22-23)  Magnesium: 2.1 (08-22-23)  LFT - Alb/Tprot/Tbili/Dbili/AlkPhos/ALT/AST: 2.9/--/<0.2/--/59/6/13 (08-22-23)  PT/aPTT/INR: 11.6/33.5/1.02 (08-21-23)  Thyroid Stimulating Hormone, Serum: 26.130 (08-21-23)  Total T4/Free T4: 2.52/<0.378 (08-21-23)  CBC - WBC/HGB/HTC/PLT: 4.53/12.0/37.9/290 (08-22-23)BMP: Na/K/Cl/Bicarb/BUN/Cr/Gluc: 139/4.0/109/25/23/1.74/93 (08-22-23)  Anion Gap: 5 (08-22-23)  eGFR: 44 (08-22-23)  Calcium: 8.3 (08-22-23)  Phosphorus: 2.9 (08-22-23)  Magnesium: 2.1 (08-22-23)    CAPILLARY BLOOD GLUCOSE & INSULIN RECEIVED        08-21-23 @ 07:01  -  08-22-23 @ 07:00  --------------------------------------------------------  IN: 0 mL / OUT: 150 mL / NET: -150 mL        ASSESSMENT / RECOMMENDATIONS    REJI BLAKE is a 63y Male with a past medical history of chronic back pain, hypothyroidism, extensive alcohol use disorder was brought to [facility] after being found down on the ground, admitted to medicine service on 08/21 for concern of osteomyelitis and etoh intoxication.       Pt has a long standing of left hydronephrosis and was planned for outpatient left ureteroscopy with endopyelotomy which patient missed the appointment.   Patient again had severe chronic left hydronephrosis during this admission.  Urology was consulted and patient underwent Cystoscopy with left ureteroscopy - S/p attempted unsuccessful L URS on 8/21/23. Distal-mid ureter noted to be entirely obstructed vs obliterated with inability to pass wire or contrast retrograde beyond area of obstruction.  Pt has now been taken to IR for left nephrostomy tube placement.    On admission, patient found to have TSH was 26.13 and Free T4 <0.378 on 08/21.  During admission in 04/2023, TSH was 8.38 and Free T4 was 0.744.  Patient was discharged with Home dose synthroid 112mcg.  Endocrinology has been consulted for Management of Hypothyroidism.      A1C: BUN: 23  Creatinine: 1.74  GFR: 44  Weight: 85  BMI: 26.9    # Hypothyroidism  TSH was 26.13 and Free T4 <0.378 on 08/21 04/2023, TSH was 8.38 and Free T4 was 0.744.    - please start Levothyroxine:    - Please take levothyroxine in the empty stomach in the morning at least one hour before other medications and two hours before breatkfast.  - Discharge recommendations to be discussed.   - Patient can follow up at discharge with [facility] by calling [phone] to make an appointment.      Case discussed with Dr. Marsh. Primary team updated.       Samia Paul  Endocrinology Fellow    Service Pager: [phone]  HISTORY OF PRESENT ILLNESS  REJI BLAKE is a 63y Male with a past medical history of chronic back pain, hypothyroidism, extensive alcohol use disorder was brought to [facility] after being found down on the ground, admitted to medicine service on 08/21 for concern of osteomyelitis and etoh intoxication.       Pt has a long standing of left hydronephrosis and was planned for outpatient left ureteroscopy with endopyelotomy which patient missed the appointment.   Patient again had severe chronic left hydronephrosis during this admission.  Urology was consulted and patient underwent Cystoscopy with left ureteroscopy - S/p attempted unsuccessful L URS on 8/21/23. Distal-mid ureter noted to be entirely obstructed vs obliterated with inability to pass wire or contrast retrograde beyond area of obstruction.  Pt has now been taken to IR for left nephrostomy tube placement.    Patient is followed by ID for concerns for chronic osteomyelitis in the spine.  Patient was admitted for fall in 04/2023 and found to have osteomyelitis/diskitis, where bone biopsy was obtained and dc'ed with IV antibiotics. Dr. Jiménez notes that she reviewed MRI lumbar spine with Dr. Johnson (Neuroradiology) on 7/31 with concerns for osteomyelitis/discitis L4-L5 without change from 4/2023. There is no progression and would have expected progression of findings over 3 months if infectious but alternatively could be very indolent organism. Patient was planned for a dual isotope bone/gallium scan with SPECT CT to try to differentiate which patient had not followed-up with    On admission, patient was afebrile and hemodynamically stable.   Labs revealed Na 136, K 3.7, Cr 1.66, Glucose 121, BUN 30, Cr 1.66, etoh level <3.  TSH was 26.13 and Free T4 <0.378 on 08/21.  Endocrinology has been consulted for Management of Hypothyroidism.  Pt states he has been diagnosed with hypothyroidism many years ago and been on thyroid replacement therapy.   During admission in 04/2023, TSH was 8.38 and Free T4 was 0.744.   Patient was discharged with Home dose synthroid 112mcg.  Currently pt resides at the assisted living facility.  Pt currently takes levothyroxine 150mcg daily.   pt states he is given the medication around 8am after breakfast and with other medications.  He does not know if he has Hashimoto thyroiditis.  His niece also has thyroid issue.   Spoke to assisted living at , and the nurse endorsed that patient takes levothyroxine on his own.   His hypothyroidism has been managed by his PCP    ALLERGIES  No Known Allergies      CURRENT MEDICATIONS  acetaminophen     Tablet .. 650 milliGRAM(s) Oral every 6 hours PRN  lidocaine   4% Patch 1 Patch Transdermal every 24 hours      REVIEW OF SYSTEMS  Constitutional:  Negative fever, chills or loss of appetite.  Eyes:  Negative blurry vision or double vision.  Cardiovascular:  Negative for chest pain or palpitations.  Respiratory:  Negative for cough, wheezing, or shortness of breath.   Gastrointestinal:  Negative for nausea, vomiting, diarrhea, constipation, or abdominal pain.  Genitourinary:  Negative frequency, urgency or dysuria.  Neurologic:  No headache, confusion, dizziness, lightheadedness.    PHYSICAL EXAM  Vital Signs Last 24 Hrs  T(C): 36.7 (22 Aug 2023 06:07), Max: 37 (21 Aug 2023 20:55)  T(F): 98.1 (22 Aug 2023 06:07), Max: 98.6 (21 Aug 2023 20:55)  HR: 53 (22 Aug 2023 06:07) (52 - 71)  BP: 120/76 (22 Aug 2023 06:07) (108/74 - 146/99)  BP(mean): 98 (21 Aug 2023 17:56) (98 - 117)  RR: 18 (22 Aug 2023 06:07) (9 - 18)  SpO2: 100% (22 Aug 2023 06:07) (95% - 100%)    Parameters below as of 21 Aug 2023 20:55  Patient On (Oxygen Delivery Method): room air    Constitutional: Awake, alert, in no acute distress.   HEENT: Normocephalic, atraumatic, ZENOBIA, no proptosis or lid retraction.   Neck: supple, no acanthosis, no thyromegaly or palpable thyroid nodules.  Respiratory: Lungs clear to ausculation bilaterally.   Cardiovascular: regular rhythm, normal S1 and S2, no audible murmurs.   GI: soft, non-tender, non-distended, bowel sounds present, no masses appreciated.  Extremities: No lower extremity edema, peripheral pulses present.   Skin: no rashes.   Psychiatric: AAO x 3. Normal affect/mood.     LABS  CBC - WBC/HGB/HTC/PLT: 4.53/12.0/37.9/290 (08-22-23)  BMP: Na/K/Cl/Bicarb/BUN/Cr/Gluc: 139/4.0/109/25/23/1.74/93 (08-22-23)  Anion Gap: 5 (08-22-23)  eGFR: 44 (08-22-23)  Calcium: 8.3 (08-22-23)  Phosphorus: 2.9 (08-22-23)  Magnesium: 2.1 (08-22-23)  LFT - Alb/Tprot/Tbili/Dbili/AlkPhos/ALT/AST: 2.9/--/<0.2/--/59/6/13 (08-22-23)  PT/aPTT/INR: 11.6/33.5/1.02 (08-21-23)  Thyroid Stimulating Hormone, Serum: 26.130 (08-21-23)  Total T4/Free T4: 2.52/<0.378 (08-21-23)  CBC - WBC/HGB/HTC/PLT: 4.53/12.0/37.9/290 (08-22-23)BMP: Na/K/Cl/Bicarb/BUN/Cr/Gluc: 139/4.0/109/25/23/1.74/93 (08-22-23)  Anion Gap: 5 (08-22-23)  eGFR: 44 (08-22-23)  Calcium: 8.3 (08-22-23)  Phosphorus: 2.9 (08-22-23)  Magnesium: 2.1 (08-22-23)    CAPILLARY BLOOD GLUCOSE & INSULIN RECEIVED        08-21-23 @ 07:01  -  08-22-23 @ 07:00  --------------------------------------------------------  IN: 0 mL / OUT: 150 mL / NET: -150 mL        ASSESSMENT / RECOMMENDATIONS    REJI BLAKE is a 63y Male with a past medical history of chronic back pain, hypothyroidism, extensive alcohol use disorder was brought to [facility] after being found down on the ground, admitted to medicine service on 08/21 for concern of osteomyelitis and etoh intoxication.       Pt has a long standing of left hydronephrosis and was planned for outpatient left ureteroscopy with endopyelotomy which patient missed the appointment.   Patient again had severe chronic left hydronephrosis during this admission.  Urology was consulted and patient underwent Cystoscopy with left ureteroscopy - S/p attempted unsuccessful L URS on 8/21/23. Distal-mid ureter noted to be entirely obstructed vs obliterated with inability to pass wire or contrast retrograde beyond area of obstruction.  Pt has now been taken to IR for left nephrostomy tube placement.    On admission, patient found to have TSH was 26.13 and Free T4 <0.378 on 08/21.  During admission in 04/2023, TSH was 8.38 and Free T4 was 0.744.  Patient was discharged with Home dose synthroid 112mcg.  Endocrinology has been consulted for Management of Hypothyroidism.      A1C: BUN: 23  Creatinine: 1.74  GFR: 44  Weight: 85  BMI: 26.9    # Hypothyroidism  TSH was 26.13 and Free T4 <0.378 on 08/21 04/2023, TSH was 8.38 and Free T4 was 0.744.    Likely from non-compliance  - please continue levothyroxine 150mcg.  - recheck TFTs outpatient   - Please take levothyroxine in the empty stomach in the morning at least one hour before other medications and two hours before breatkfast.  - Discharge recommendations to be discussed.   - Patient can follow up at discharge with [facility] by calling [phone] to make an appointment.      Case discussed with Dr. Marsh. Primary team updated.       Samia Paul  Endocrinology Fellow    Service Pager: [phone]  HISTORY OF PRESENT ILLNESS  REJI BLAKE is a 63y Male with a past medical history of chronic back pain, hypothyroidism, extensive alcohol use disorder was brought to [facility] after being found down on the ground, admitted to medicine service on 08/21 for concern of osteomyelitis and etoh intoxication.       Pt has a long standing of left hydronephrosis and was planned for outpatient left ureteroscopy with endopyelotomy which patient missed the appointment.   Patient again had severe chronic left hydronephrosis during this admission.  Urology was consulted and patient underwent Cystoscopy with left ureteroscopy - S/p attempted unsuccessful L URS on 8/21/23. Distal-mid ureter noted to be entirely obstructed vs obliterated with inability to pass wire or contrast retrograde beyond area of obstruction.  Pt has now been taken to IR for left nephrostomy tube placement.    Patient is followed by ID for concerns for chronic osteomyelitis in the spine.  Patient was admitted for fall in 04/2023 and found to have osteomyelitis/diskitis, where bone biopsy was obtained and dc'ed with IV antibiotics. Dr. Jiménez notes that she reviewed MRI lumbar spine with Dr. Johnson (Neuroradiology) on 7/31 with concerns for osteomyelitis/discitis L4-L5 without change from 4/2023. There is no progression and would have expected progression of findings over 3 months if infectious but alternatively could be very indolent organism. Patient was planned for a dual isotope bone/gallium scan with SPECT CT to try to differentiate which patient had not followed-up with    On admission, patient was afebrile and hemodynamically stable.   Labs revealed Na 136, K 3.7, Cr 1.66, Glucose 121, BUN 30, Cr 1.66, etoh level <3.  TSH was 26.13 and Free T4 <0.378 on 08/21.  Endocrinology has been consulted for Management of Hypothyroidism.  Pt states he has been diagnosed with hypothyroidism many years ago and been on thyroid replacement therapy.   During admission in 04/2023, TSH was 8.38 and Free T4 was 0.744.   Patient was discharged with Home dose synthroid 112mcg.  Currently pt resides at the assisted living facility.  Pt currently takes levothyroxine 150mcg daily.   pt states he is given the medication around 8am after breakfast and with other medications.  He does not know if he has Hashimoto thyroiditis.  His niece also has thyroid issue.   Spoke to assisted living at , and the nurse endorsed that patient takes levothyroxine on his own.   His hypothyroidism has been managed by his PCP    ALLERGIES  No Known Allergies      CURRENT MEDICATIONS  acetaminophen     Tablet .. 650 milliGRAM(s) Oral every 6 hours PRN  lidocaine   4% Patch 1 Patch Transdermal every 24 hours      REVIEW OF SYSTEMS  Constitutional:  Negative fever, chills or loss of appetite.  Eyes:  Negative blurry vision or double vision.  Cardiovascular:  Negative for chest pain or palpitations.  Respiratory:  Negative for cough, wheezing, or shortness of breath.   Gastrointestinal:  Negative for nausea, vomiting, diarrhea, constipation, or abdominal pain.  Genitourinary:  Negative frequency, urgency or dysuria.  Neurologic:  No headache, confusion, dizziness, lightheadedness.    PHYSICAL EXAM  Vital Signs Last 24 Hrs  T(C): 36.7 (22 Aug 2023 06:07), Max: 37 (21 Aug 2023 20:55)  T(F): 98.1 (22 Aug 2023 06:07), Max: 98.6 (21 Aug 2023 20:55)  HR: 53 (22 Aug 2023 06:07) (52 - 71)  BP: 120/76 (22 Aug 2023 06:07) (108/74 - 146/99)  BP(mean): 98 (21 Aug 2023 17:56) (98 - 117)  RR: 18 (22 Aug 2023 06:07) (9 - 18)  SpO2: 100% (22 Aug 2023 06:07) (95% - 100%)    Parameters below as of 21 Aug 2023 20:55  Patient On (Oxygen Delivery Method): room air    Constitutional: Awake, alert, in no acute distress.   HEENT: Normocephalic, atraumatic, ZENOBIA, no proptosis or lid retraction.   Neck: supple, no acanthosis, no thyromegaly or palpable thyroid nodules.  Respiratory: Lungs clear to ausculation bilaterally.   Cardiovascular: regular rhythm, normal S1 and S2, no audible murmurs.   GI: soft, non-tender, non-distended, bowel sounds present, no masses appreciated.  Extremities: No lower extremity edema, peripheral pulses present.   Skin: no rashes.   Psychiatric: AAO x 3. Normal affect/mood.     LABS  CBC - WBC/HGB/HTC/PLT: 4.53/12.0/37.9/290 (08-22-23)  BMP: Na/K/Cl/Bicarb/BUN/Cr/Gluc: 139/4.0/109/25/23/1.74/93 (08-22-23)  Anion Gap: 5 (08-22-23)  eGFR: 44 (08-22-23)  Calcium: 8.3 (08-22-23)  Phosphorus: 2.9 (08-22-23)  Magnesium: 2.1 (08-22-23)  LFT - Alb/Tprot/Tbili/Dbili/AlkPhos/ALT/AST: 2.9/--/<0.2/--/59/6/13 (08-22-23)  PT/aPTT/INR: 11.6/33.5/1.02 (08-21-23)  Thyroid Stimulating Hormone, Serum: 26.130 (08-21-23)  Total T4/Free T4: 2.52/<0.378 (08-21-23)  CBC - WBC/HGB/HTC/PLT: 4.53/12.0/37.9/290 (08-22-23)BMP: Na/K/Cl/Bicarb/BUN/Cr/Gluc: 139/4.0/109/25/23/1.74/93 (08-22-23)  Anion Gap: 5 (08-22-23)  eGFR: 44 (08-22-23)  Calcium: 8.3 (08-22-23)  Phosphorus: 2.9 (08-22-23)  Magnesium: 2.1 (08-22-23)    CAPILLARY BLOOD GLUCOSE & INSULIN RECEIVED        08-21-23 @ 07:01  -  08-22-23 @ 07:00  --------------------------------------------------------  IN: 0 mL / OUT: 150 mL / NET: -150 mL        ASSESSMENT / RECOMMENDATIONS    REJI BLAKE is a 63y Male with a past medical history of chronic back pain, hypothyroidism, extensive alcohol use disorder was brought to [facility] after being found down on the ground, admitted to medicine service on 08/21 for concern of osteomyelitis and etoh intoxication.       Pt has a long standing of left hydronephrosis and was planned for outpatient left ureteroscopy with endopyelotomy which patient missed the appointment.   Patient again had severe chronic left hydronephrosis during this admission.  Urology was consulted and patient underwent Cystoscopy with left ureteroscopy - S/p attempted unsuccessful L URS on 8/21/23. Distal-mid ureter noted to be entirely obstructed vs obliterated with inability to pass wire or contrast retrograde beyond area of obstruction.  Pt has now been taken to IR for left nephrostomy tube placement.    On admission, patient found to have TSH was 26.13 and Free T4 <0.378 on 08/21.  During admission in 04/2023, TSH was 8.38 and Free T4 was 0.744.  Patient was discharged with Home dose synthroid 112mcg.  Endocrinology has been consulted for Management of Hypothyroidism.      A1C: BUN: 23  Creatinine: 1.74  GFR: 44  Weight: 85  BMI: 26.9    # Hypothyroidism  TSH was 26.13 and Free T4 <0.378 on 08/21 04/2023, TSH was 8.38 and Free T4 was 0.744.    Likely from non-compliance  - please continue levothyroxine 150mcg.  - recheck TFTs outpatient   - Please take levothyroxine in the empty stomach in the morning at least one hour before other medications and two hours before breatkfast.  - Discharge recommendations: reduce dose to levothyroxine 125mcg   - Patient can follow up at discharge with [facility] by calling [phone] to make an appointment.      Case discussed with Dr. Marsh. Primary team updated.       Samia Paul  Endocrinology Fellow    Service Pager: [phone]

## 2023-08-22 NOTE — PROGRESS NOTE ADULT - ASSESSMENT
Mr. Healy is a 63 year old male with a past medical history of chronic back pain with concerns for L4/L5 osteomyelitis, alcohol abuse without withdrawal history who was brought in to [facility] after being found sleeping on the ground intoxicated, admitted for further management of possible osteomyelitis and hydronephrosis.

## 2023-08-23 DIAGNOSIS — R11.2 NAUSEA WITH VOMITING, UNSPECIFIED: ICD-10-CM

## 2023-08-23 LAB
CULTURE RESULTS: SIGNIFICANT CHANGE UP
ORGANISM # SPEC MICROSCOPIC CNT: SIGNIFICANT CHANGE UP
ORGANISM # SPEC MICROSCOPIC CNT: SIGNIFICANT CHANGE UP
SPECIMEN SOURCE: SIGNIFICANT CHANGE UP

## 2023-08-23 PROCEDURE — 74018 RADEX ABDOMEN 1 VIEW: CPT | Mod: 26

## 2023-08-23 PROCEDURE — 99232 SBSQ HOSP IP/OBS MODERATE 35: CPT | Mod: GC

## 2023-08-23 RX ORDER — ONDANSETRON 8 MG/1
4 TABLET, FILM COATED ORAL ONCE
Refills: 0 | Status: DISCONTINUED | OUTPATIENT
Start: 2023-08-23 | End: 2023-08-23

## 2023-08-23 RX ORDER — ONDANSETRON 8 MG/1
4 TABLET, FILM COATED ORAL ONCE
Refills: 0 | Status: COMPLETED | OUTPATIENT
Start: 2023-08-23 | End: 2023-08-23

## 2023-08-23 RX ORDER — ONDANSETRON 8 MG/1
4 TABLET, FILM COATED ORAL EVERY 6 HOURS
Refills: 0 | Status: DISCONTINUED | OUTPATIENT
Start: 2023-08-23 | End: 2023-08-25

## 2023-08-23 RX ORDER — OXYCODONE HYDROCHLORIDE 5 MG/1
2.5 TABLET ORAL EVERY 6 HOURS
Refills: 0 | Status: DISCONTINUED | OUTPATIENT
Start: 2023-08-23 | End: 2023-08-28

## 2023-08-23 RX ADMIN — Medication 150 MICROGRAM(S): at 07:21

## 2023-08-23 RX ADMIN — ONDANSETRON 4 MILLIGRAM(S): 8 TABLET, FILM COATED ORAL at 23:45

## 2023-08-23 RX ADMIN — ONDANSETRON 4 MILLIGRAM(S): 8 TABLET, FILM COATED ORAL at 11:02

## 2023-08-23 RX ADMIN — Medication 650 MILLIGRAM(S): at 12:02

## 2023-08-23 RX ADMIN — Medication 650 MILLIGRAM(S): at 07:20

## 2023-08-23 RX ADMIN — ONDANSETRON 4 MILLIGRAM(S): 8 TABLET, FILM COATED ORAL at 16:14

## 2023-08-23 RX ADMIN — TRAMADOL HYDROCHLORIDE 25 MILLIGRAM(S): 50 TABLET ORAL at 00:44

## 2023-08-23 RX ADMIN — ONDANSETRON 4 MILLIGRAM(S): 8 TABLET, FILM COATED ORAL at 07:20

## 2023-08-23 RX ADMIN — Medication 650 MILLIGRAM(S): at 00:15

## 2023-08-23 RX ADMIN — OXYCODONE HYDROCHLORIDE 2.5 MILLIGRAM(S): 5 TABLET ORAL at 17:29

## 2023-08-23 NOTE — PROGRESS NOTE ADULT - SUBJECTIVE AND OBJECTIVE BOX
**INCOMPLETE NOTE    OVERNIGHT EVENTS: None    SUBJECTIVE:  Patient seen and examined at bedside, comfortable, NAD. Denied fever, chest pain, dyspnea, abdominal pain.     Vital Signs Last 12 Hrs  T(F): 97.4 (08-23-23 @ 06:38), Max: 97.4 (08-23-23 @ 06:38)  HR: 63 (08-23-23 @ 06:38) (63 - 63)  BP: 142/90 (08-23-23 @ 06:38) (142/90 - 142/90)  BP(mean): --  RR: 18 (08-23-23 @ 06:38) (18 - 18)  SpO2: 98% (08-23-23 @ 06:38) (98% - 98%)  I&O's Summary      PHYSICAL EXAM:  Constitutional: NAD, comfortable in bed.  HEENT: NC/AT, PERRLA, EOMI, no conjunctival pallor or scleral icterus, MMM  Neck: Supple, no JVD  Respiratory: CTA B/L. No w/r/r.   Cardiovascular: RRR, normal S1 and S2, no m/r/g.   Gastrointestinal: +BS, soft NTND, no guarding or rebound tenderness, no palpable masses   Extremities: wwp; no cyanosis, clubbing or edema.   Vascular: Pulses equal and strong throughout.   Neurological: AAOx3, no CN deficits, strength and sensation intact throughout.   Skin: No gross skin abnormalities or rashes        LABS:                        12.0   4.53  )-----------( 290      ( 22 Aug 2023 05:30 )             37.9     08-22    139  |  109<H>  |  23  ----------------------------<  93  4.0   |  25  |  1.74<H>    Ca    8.3<L>      22 Aug 2023 05:30  Phos  2.9     08-22  Mg     2.1     08-22    TPro  5.7<L>  /  Alb  2.9<L>  /  TBili  <0.2  /  DBili  x   /  AST  13  /  ALT  6<L>  /  AlkPhos  59  08-22      Urinalysis Basic - ( 22 Aug 2023 05:30 )    Color: x / Appearance: x / SG: x / pH: x  Gluc: 93 mg/dL / Ketone: x  / Bili: x / Urobili: x   Blood: x / Protein: x / Nitrite: x   Leuk Esterase: x / RBC: x / WBC x   Sq Epi: x / Non Sq Epi: x / Bacteria: x          RADIOLOGY & ADDITIONAL TESTS:    MEDICATIONS  (STANDING):  acetaminophen     Tablet .. 650 milliGRAM(s) Oral every 6 hours  levothyroxine 150 MICROGram(s) Oral daily  lidocaine   4% Patch 1 Patch Transdermal every 24 hours    MEDICATIONS  (PRN):  traMADol 25 milliGRAM(s) Oral every 6 hours PRN Moderate Pain (4 - 6)   OVERNIGHT EVENTS: Pt reports nausea and 3 episodes of vomiting overnight, 1 BM pt describes as normal this am.    SUBJECTIVE:  Pt denies fever, chills chest pain, shortness of breath, abdominal pain. Endorses tenderness surrounding PCN placed yesterday, but denies right flank pain/CVA tenderness, as well as difficulty urinating (no straining to void or incomplete emptying). Denies diarrhea or constipation.      Vital Signs Last 12 Hrs  T(F): 97.4 (08-23-23 @ 06:38), Max: 97.4 (08-23-23 @ 06:38)  HR: 63 (08-23-23 @ 06:38) (63 - 63)  BP: 142/90 (08-23-23 @ 06:38) (142/90 - 142/90)  BP(mean): --  RR: 18 (08-23-23 @ 06:38) (18 - 18)  SpO2: 98% (08-23-23 @ 06:38) (98% - 98%)  I&O's Summary      PHYSICAL EXAM: ********************************  Constitutional: NAD, comfortable in bed.  HEENT: NC/AT, PERRLA, EOMI, no conjunctival pallor or scleral icterus, MMM  Neck: Supple, no JVD  Respiratory: CTA B/L. No w/r/r.   Cardiovascular: RRR, normal S1 and S2, no m/r/g.   Gastrointestinal: +BS, soft NTND, no guarding or rebound tenderness, no palpable masses   Extremities: wwp; no cyanosis, clubbing or edema.   Vascular: Pulses equal and strong throughout.   Neurological: AAOx3, no CN deficits, strength and sensation intact throughout.   Skin: No gross skin abnormalities or rashes        LABS:                        12.0   4.53  )-----------( 290      ( 22 Aug 2023 05:30 )             37.9     08-22    139  |  109<H>  |  23  ----------------------------<  93  4.0   |  25  |  1.74<H>    Ca    8.3<L>      22 Aug 2023 05:30  Phos  2.9     08-22  Mg     2.1     08-22    TPro  5.7<L>  /  Alb  2.9<L>  /  TBili  <0.2  /  DBili  x   /  AST  13  /  ALT  6<L>  /  AlkPhos  59  08-22      Urinalysis Basic - ( 22 Aug 2023 05:30 )    Color: x / Appearance: x / SG: x / pH: x  Gluc: 93 mg/dL / Ketone: x  / Bili: x / Urobili: x   Blood: x / Protein: x / Nitrite: x   Leuk Esterase: x / RBC: x / WBC x   Sq Epi: x / Non Sq Epi: x / Bacteria: x          RADIOLOGY & ADDITIONAL TESTS:    MEDICATIONS  (STANDING):  acetaminophen     Tablet .. 650 milliGRAM(s) Oral every 6 hours  levothyroxine 150 MICROGram(s) Oral daily  lidocaine   4% Patch 1 Patch Transdermal every 24 hours    MEDICATIONS  (PRN):  traMADol 25 milliGRAM(s) Oral every 6 hours PRN Moderate Pain (4 - 6)   OVERNIGHT EVENTS: Pt reports nausea and 3 episodes of vomiting overnight, 1 BM pt describes as normal this am.    SUBJECTIVE:  Pt denies fever, chills chest pain, shortness of breath, abdominal pain. Endorses tenderness surrounding PCN placed yesterday, but denies right flank pain/CVA tenderness, as well as difficulty urinating (no straining to void or incomplete emptying). Denies diarrhea or constipation.      Vital Signs Last 12 Hrs  T(F): 97.4 (08-23-23 @ 06:38), Max: 97.4 (08-23-23 @ 06:38)  HR: 63 (08-23-23 @ 06:38) (63 - 63)  BP: 142/90 (08-23-23 @ 06:38) (142/90 - 142/90)  BP(mean): --  RR: 18 (08-23-23 @ 06:38) (18 - 18)  SpO2: 98% (08-23-23 @ 06:38) (98% - 98%)  I&O's Summary      PHYSICAL EXAM:  General: awake, alert, and oriented; well-developed, well-nourished; appears stated age; complaining of nausea, with vomitus in bedside basin.  HEENT: normocephalic, atraumatic; clear conjunctivae, EOM intact, PERRLA; external ears & canals non-tender, non-erythematous; mucous membranes pink, moist, without erythema or exudates, poor oral hygiene and dentition - missing numerous upper & lower teeth.   Neck: supple, without adenopathy; carotid pulses 2+ bilaterally without bruit; no JVD.   Cardiac: regular heart rate and rhythm, without murmurs, rubs, or gallops.   Respiratory: chest wall symmetric and atraumatic; no signs of respiratory distress; lung sounds clear in all lobes, without wheezes, rales, or rhonchi.   Abdominal: soft, non-tender, non-distended; bowel sounds present and normoactive in all quadrants.   Back: Left Percutaneous Nephrolithotomy in place, covered by clean, dry bandage, urine with minimal blood collecting in catheter bag.   Neurological: patient is awake, alert, and oriented to person, place, and time.    LABS:                        12.0   4.53  )-----------( 290      ( 22 Aug 2023 05:30 )             37.9     08-22    139  |  109<H>  |  23  ----------------------------<  93  4.0   |  25  |  1.74<H>    Ca    8.3<L>      22 Aug 2023 05:30  Phos  2.9     08-22  Mg     2.1     08-22    TPro  5.7<L>  /  Alb  2.9<L>  /  TBili  <0.2  /  DBili  x   /  AST  13  /  ALT  6<L>  /  AlkPhos  59  08-22      Urinalysis Basic - ( 22 Aug 2023 05:30 )    Color: x / Appearance: x / SG: x / pH: x  Gluc: 93 mg/dL / Ketone: x  / Bili: x / Urobili: x   Blood: x / Protein: x / Nitrite: x   Leuk Esterase: x / RBC: x / WBC x   Sq Epi: x / Non Sq Epi: x / Bacteria: x          RADIOLOGY & ADDITIONAL TESTS:    MEDICATIONS  (STANDING):  acetaminophen     Tablet .. 650 milliGRAM(s) Oral every 6 hours  levothyroxine 150 MICROGram(s) Oral daily  lidocaine   4% Patch 1 Patch Transdermal every 24 hours    MEDICATIONS  (PRN):  traMADol 25 milliGRAM(s) Oral every 6 hours PRN Moderate Pain (4 - 6)

## 2023-08-23 NOTE — PROGRESS NOTE ADULT - ASSESSMENT
Mr. Healy is a 63 year old male with a past medical history of chronic back pain with concerns for L4/L5 osteomyelitis, alcohol abuse without withdrawal history who was brought in to [facility] after being found sleeping on the ground intoxicated, admitted for further management of possible osteomyelitis and hydronephrosis, 1 day s/p L PCN placement.

## 2023-08-23 NOTE — PROGRESS NOTE ADULT - ASSESSMENT
64 y/o male s/p attempted unsuccessful L URS on 8/21. Distal-mid ureter noted to be entirely obstructed vs obliterated with inability to pass wire or contrast retrograde beyond area of obstruction. Now s/p IR L PCN 8/22.    Recommendations:  -Continue L PCN  -Patient will follow up with Dr. Jiménez upon discharge for antegrade ureteroscopy  -Appreciate excellent care per primary team  -Discussed with attending

## 2023-08-23 NOTE — PROGRESS NOTE ADULT - SUBJECTIVE AND OBJECTIVE BOX
63M with a past medical history of chronic back pain with concerns for L4/L5 osteomyelitis, alcohol abuse without withdrawal history who was brought in to [facility] after being found sleeping on the ground intoxicated, admitted for further management of possible osteomyelitis and hydronephrosis. S/p attempted unsuccessful L URS on 8/21/23. Distal-mid ureter noted to be entirely obstructed vs obliterated with inability to pass wire or contrast retrograde beyond area of obstruction. Urology following, recommending nephrostomy placement. IR consulted for left PCN placed 8/22/23.    L PCN: 100 cc bloody output in the last 24 hours. Flushed easily with 6 cc NS. Dressings CDI. IR will continue to follow.

## 2023-08-23 NOTE — PROGRESS NOTE ADULT - SUBJECTIVE AND OBJECTIVE BOX
UROLOGY PROGRESS NOTE    SUBJECTIVE: Patient seen and examined bedside. Patient reports mild nausea, denies fevers/chills, abd/flank pain, straining to void, or incomplete emptying.       Vital Signs Last 24 Hrs  T(C): 36.3 (23 Aug 2023 06:38), Max: 36.6 (22 Aug 2023 15:18)  T(F): 97.4 (23 Aug 2023 06:38), Max: 97.9 (22 Aug 2023 15:18)  HR: 63 (23 Aug 2023 06:38) (58 - 63)  BP: 142/90 (23 Aug 2023 06:38) (142/90 - 152/89)  BP(mean): --  RR: 18 (23 Aug 2023 06:38) (18 - 18)  SpO2: 98% (23 Aug 2023 06:38) (98% - 98%)      I&O's Detail    22 Aug 2023 07:01  -  23 Aug 2023 07:00  --------------------------------------------------------  IN:  Total IN: 0 mL    OUT:    Nephrostomy Tube (mL): 100 mL  Total OUT: 100 mL    Total NET: -100 mL          PHYSICAL EXAM    General: NAD, resting comfortably in bed  C/V: NSR  Pulm: Nonlabored breathing, no respiratory distress on room air  Abd: soft, ND/NT, no rebound tenderness, no guarding  : L PCN draining serosanguineous  Extrem: WWP, no edema, SCDs in place        LABS:                        12.0   4.53  )-----------( 290      ( 22 Aug 2023 05:30 )             37.9     08-22    139  |  109<H>  |  23  ----------------------------<  93  4.0   |  25  |  1.74<H>    Ca    8.3<L>      22 Aug 2023 05:30  Phos  2.9     08-22  Mg     2.1     08-22    TPro  5.7<L>  /  Alb  2.9<L>  /  TBili  <0.2  /  DBili  x   /  AST  13  /  ALT  6<L>  /  AlkPhos  59  08-22      Urinalysis Basic - ( 22 Aug 2023 05:30 )    Color: x / Appearance: x / SG: x / pH: x  Gluc: 93 mg/dL / Ketone: x  / Bili: x / Urobili: x   Blood: x / Protein: x / Nitrite: x   Leuk Esterase: x / RBC: x / WBC x   Sq Epi: x / Non Sq Epi: x / Bacteria: x        CULTURES:      Culture - Blood (collected 08-22-23 @ 14:44)  Source: .Blood Blood  Preliminary Report (08-23-23 @ 03:00):    No growth at 12 hours    Culture - Blood (collected 08-22-23 @ 14:44)  Source: .Blood Blood  Preliminary Report (08-23-23 @ 03:00):    No growth at 12 hours    Culture - Blood (collected 08-20-23 @ 20:22)  Source: .Blood Blood-Peripheral  Preliminary Report (08-23-23 @ 07:02):    No growth at 48 Hours    Culture - Blood (collected 08-20-23 @ 20:22)  Source: .Blood Blood-Peripheral  Gram Stain (08-22-23 @ 05:50):    Growth in aerobic bottle: Gram Positive Cocci in Clusters  Preliminary Report (08-22-23 @ 23:35):    Growth in aerobic bottle: Staphylococcus capitis    Growth in aerobic bottle: Coag Negative Staphylococcus Unable to Identify    further    Coagulase Negative Staphylococci isolated from a single blood culture set    may represent contamination.    Contact the Microbiology Department at [phone] if susceptibility    testing is clinically indicated.    Direct identification is available within approximately 3-5    hours either by Blood Panel Multiplexed PCR or Direct    MALDI-TOF. Details: [email or web address]  Organism: Blood Culture PCR (08-22-23 @ 06:51)  Organism: Blood Culture PCR (08-22-23 @ 06:51)      Method Type: PCR      -  Staphylococcus epidermidis, Methicillin resistant: Detec        Culture - Urine (collected 08-20-23 @ 20:22)  Source: Clean Catch Clean Catch (Midstream)  Final Report (08-22-23 @ 07:22):    <10,000 CFU/mL Normal Urogenital Irena        RADIOLOGY & ADDITIONAL STUDIES:

## 2023-08-24 PROCEDURE — 99232 SBSQ HOSP IP/OBS MODERATE 35: CPT | Mod: GC

## 2023-08-24 RX ORDER — SODIUM CHLORIDE 9 MG/ML
1000 INJECTION, SOLUTION INTRAVENOUS
Refills: 0 | Status: DISCONTINUED | OUTPATIENT
Start: 2023-08-24 | End: 2023-08-25

## 2023-08-24 RX ADMIN — Medication 150 MICROGRAM(S): at 06:38

## 2023-08-24 RX ADMIN — ONDANSETRON 4 MILLIGRAM(S): 8 TABLET, FILM COATED ORAL at 18:11

## 2023-08-24 RX ADMIN — Medication 650 MILLIGRAM(S): at 19:22

## 2023-08-24 RX ADMIN — ONDANSETRON 4 MILLIGRAM(S): 8 TABLET, FILM COATED ORAL at 11:04

## 2023-08-24 RX ADMIN — Medication 650 MILLIGRAM(S): at 18:11

## 2023-08-24 NOTE — PROGRESS NOTE ADULT - PROBLEM SELECTOR PLAN 6
Diagnosed in April on Synthroid 150 mcg daily.   -TSH: 26.13  -Endo consulted  -continue home synthroid as prescribed

## 2023-08-24 NOTE — PROGRESS NOTE ADULT - PROBLEM SELECTOR PLAN 1
Unchanged severe left hydronephrosis with marked cortical thinning, Followed by Urology for this issue and outpatient planned for 08/14 but planned to be rescheduled with possible in hospital transition.     -Left ureteroscopy unsuccessful per Urology. Plan left PCN placement.   -IR consult for PCN placement (8/22)  - Urology following   - Monitor urine output  -analgesia for pain control

## 2023-08-24 NOTE — PROGRESS NOTE ADULT - PROBLEM SELECTOR PLAN 3
minimal vomiting, pt retching and spitting saliva into basin, able to keep food down  - encourage pt to use prn anti-emetics, as he has been refusing  - if zofran does not improve his symptoms, consider changing reglan

## 2023-08-24 NOTE — PROGRESS NOTE ADULT - PROBLEM SELECTOR PLAN 4
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 Patient underwent biopsy that did not yield bacterial data and refused discharge to [facility] for IV antibiotics while this was worked up. Patient with consistently low ESR/CRP/leukocytosis and no fevers.   Repeat MR lumbar also showing OM vs discitis, though no progression of disease. History of back injections for pain management. Planned for dual isotope bone/gallium scan with SPECT CT with ID for differentiation.   Bx Culture: Gram positive cocci in clusters.   Plan:  -F/U sensitivities   - Likely follow up outpatient for dual isotope bone/gallium scan with SPECT CT and work up for back pain.   - Would hold antibiotics at this time given prolonged duration off of antibiotic therapy without progression  - Continue home gabapentin dose  - pain control

## 2023-08-24 NOTE — PROGRESS NOTE ADULT - SUBJECTIVE AND OBJECTIVE BOX
63M with a past medical history of chronic back pain with concerns for L4/L5 osteomyelitis, alcohol abuse without withdrawal history who was brought in to [facility] after being found sleeping on the ground intoxicated, admitted for further management of possible osteomyelitis and hydronephrosis. S/p attempted unsuccessful L URS on 8/21/23. Distal-mid ureter noted to be entirely obstructed vs obliterated with inability to pass wire or contrast retrograde beyond area of obstruction. Urology following, recommending nephrostomy placement. IR consulted for left PCN placed 8/22/23.    L PCN: 100 cc bloody output recorded for Aug 23. Flushed easily with 6 cc NS. Dressings CDI. IR will continue to follow.     OUTPUT FOR AUG 24 NOT RECORDED IN FLOWSHEETS. It is VERY IMPORTANT for output to be recorded in flowsheets.     On examination, output is still bloody.

## 2023-08-24 NOTE — PROGRESS NOTE ADULT - SUBJECTIVE AND OBJECTIVE BOX
**INCOMPLETE NOTE    OVERNIGHT EVENTS: None    SUBJECTIVE:  Patient seen and examined at bedside, comfortable, NAD. Denied fever, chest pain, dyspnea, abdominal pain.     Vital Signs Last 12 Hrs  T(F): --  HR: --  BP: --  BP(mean): --  RR: --  SpO2: --  I&O's Summary      PHYSICAL EXAM:  Constitutional: NAD, comfortable in bed.  HEENT: NC/AT, PERRLA, EOMI, no conjunctival pallor or scleral icterus, MMM  Neck: Supple, no JVD  Respiratory: CTA B/L. No w/r/r.   Cardiovascular: RRR, normal S1 and S2, no m/r/g.   Gastrointestinal: +BS, soft NTND, no guarding or rebound tenderness, no palpable masses   Extremities: wwp; no cyanosis, clubbing or edema.   Vascular: Pulses equal and strong throughout.   Neurological: AAOx3, no CN deficits, strength and sensation intact throughout.   Skin: No gross skin abnormalities or rashes        LABS:                  RADIOLOGY & ADDITIONAL TESTS:    MEDICATIONS  (STANDING):  acetaminophen     Tablet .. 650 milliGRAM(s) Oral every 6 hours  levothyroxine 150 MICROGram(s) Oral daily  lidocaine   4% Patch 1 Patch Transdermal every 24 hours  ondansetron Injectable 4 milliGRAM(s) IV Push every 6 hours    MEDICATIONS  (PRN):  oxyCODONE    IR 2.5 milliGRAM(s) Oral every 6 hours PRN Moderate Pain (4 - 6)     OVERNIGHT EVENTS: None, continues to complain of nausea but states catheter/procedure-associated pain is much improved.    SUBJECTIVE:  Patient seen and examined at bedside.    Vital Signs: /83, RR 16, [id number], T98    PHYSICAL EXAM:  General: awake, alert, and oriented; well-developed, well-nourished; appears stated age; complaining of nausea, with vomitus in bedside basin.  HEENT: normocephalic, atraumatic; clear conjunctivae, EOM intact, PERRLA; external ears & canals non-tender, non-erythematous; mucous membranes pink, moist, without erythema or exudates, poor oral hygiene and dentition - missing numerous upper & lower teeth.   Neck: supple, without adenopathy; carotid pulses 2+ bilaterally without bruit; no JVD.   Cardiac: regular heart rate and rhythm, without murmurs, rubs, or gallops.   Respiratory: chest wall symmetric and atraumatic; no signs of respiratory distress; lung sounds clear in all lobes, without wheezes, rales, or rhonchi.   Abdominal: soft, non-tender, non-distended; bowel sounds present and normoactive in all quadrants.   Back: Left Percutaneous Nephrolithotomy in place, covered by clean, dry bandage, urine with minimal blood collecting in catheter bag.   Neurological: patient is awake, alert, and oriented to person, place, and time.      RADIOLOGY & ADDITIONAL TESTS:    MEDICATIONS  (STANDING):  acetaminophen     Tablet .. 650 milliGRAM(s) Oral every 6 hours  levothyroxine 150 MICROGram(s) Oral daily  lidocaine   4% Patch 1 Patch Transdermal every 24 hours  ondansetron Injectable 4 milliGRAM(s) IV Push every 6 hours    MEDICATIONS  (PRN):  oxyCODONE    IR 2.5 milliGRAM(s) Oral every 6 hours PRN Moderate Pain (4 - 6)

## 2023-08-24 NOTE — PROGRESS NOTE ADULT - ASSESSMENT
Mr. Healy is a 63 year old male with a past medical history of chronic back pain with concerns for L4/L5 osteomyelitis, alcohol abuse without withdrawal history who was brought in to [facility] after being found sleeping on the ground intoxicated, admitted for further management of possible osteomyelitis and hydronephrosis, 1 day s/p L PCN placement. 97.2

## 2023-08-25 ENCOUNTER — APPOINTMENT (OUTPATIENT)
Dept: NEPHROLOGY | Facility: CLINIC | Age: 63
End: 2023-08-25

## 2023-08-25 LAB
ALBUMIN SERPL ELPH-MCNC: 3.1 G/DL — LOW (ref 3.3–5)
ALP SERPL-CCNC: 79 U/L — SIGNIFICANT CHANGE UP (ref 40–120)
ALT FLD-CCNC: 7 U/L — LOW (ref 10–45)
ANION GAP SERPL CALC-SCNC: 14 MMOL/L — SIGNIFICANT CHANGE UP (ref 5–17)
AST SERPL-CCNC: 21 U/L — SIGNIFICANT CHANGE UP (ref 10–40)
BASOPHILS # BLD AUTO: 0.04 K/UL — SIGNIFICANT CHANGE UP (ref 0–0.2)
BASOPHILS NFR BLD AUTO: 0.6 % — SIGNIFICANT CHANGE UP (ref 0–2)
BILIRUB SERPL-MCNC: 0.3 MG/DL — SIGNIFICANT CHANGE UP (ref 0.2–1.2)
BUN SERPL-MCNC: 19 MG/DL — SIGNIFICANT CHANGE UP (ref 7–23)
CALCIUM SERPL-MCNC: 9.3 MG/DL — SIGNIFICANT CHANGE UP (ref 8.4–10.5)
CHLORIDE SERPL-SCNC: 97 MMOL/L — SIGNIFICANT CHANGE UP (ref 96–108)
CO2 SERPL-SCNC: 19 MMOL/L — LOW (ref 22–31)
CREAT SERPL-MCNC: 1.24 MG/DL — SIGNIFICANT CHANGE UP (ref 0.5–1.3)
CULTURE RESULTS: NO GROWTH — SIGNIFICANT CHANGE UP
EGFR: 66 ML/MIN/1.73M2 — SIGNIFICANT CHANGE UP
EOSINOPHIL # BLD AUTO: 0.03 K/UL — SIGNIFICANT CHANGE UP (ref 0–0.5)
EOSINOPHIL NFR BLD AUTO: 0.4 % — SIGNIFICANT CHANGE UP (ref 0–6)
GLUCOSE SERPL-MCNC: 77 MG/DL — SIGNIFICANT CHANGE UP (ref 70–99)
HCT VFR BLD CALC: 52 % — HIGH (ref 39–50)
HGB BLD-MCNC: 17.2 G/DL — HIGH (ref 13–17)
IMM GRANULOCYTES NFR BLD AUTO: 0.4 % — SIGNIFICANT CHANGE UP (ref 0–0.9)
LYMPHOCYTES # BLD AUTO: 1.8 K/UL — SIGNIFICANT CHANGE UP (ref 1–3.3)
LYMPHOCYTES # BLD AUTO: 26.7 % — SIGNIFICANT CHANGE UP (ref 13–44)
MAGNESIUM SERPL-MCNC: 1.9 MG/DL — SIGNIFICANT CHANGE UP (ref 1.6–2.6)
MCHC RBC-ENTMCNC: 27.5 PG — SIGNIFICANT CHANGE UP (ref 27–34)
MCHC RBC-ENTMCNC: 33.1 GM/DL — SIGNIFICANT CHANGE UP (ref 32–36)
MCV RBC AUTO: 83.2 FL — SIGNIFICANT CHANGE UP (ref 80–100)
MONOCYTES # BLD AUTO: 0.63 K/UL — SIGNIFICANT CHANGE UP (ref 0–0.9)
MONOCYTES NFR BLD AUTO: 9.3 % — SIGNIFICANT CHANGE UP (ref 2–14)
NEUTROPHILS # BLD AUTO: 4.22 K/UL — SIGNIFICANT CHANGE UP (ref 1.8–7.4)
NEUTROPHILS NFR BLD AUTO: 62.6 % — SIGNIFICANT CHANGE UP (ref 43–77)
NRBC # BLD: 0 /100 WBCS — SIGNIFICANT CHANGE UP (ref 0–0)
PHOSPHATE SERPL-MCNC: 3.2 MG/DL — SIGNIFICANT CHANGE UP (ref 2.5–4.5)
PLATELET # BLD AUTO: 328 K/UL — SIGNIFICANT CHANGE UP (ref 150–400)
POTASSIUM SERPL-MCNC: 5 MMOL/L — SIGNIFICANT CHANGE UP (ref 3.5–5.3)
POTASSIUM SERPL-SCNC: 5 MMOL/L — SIGNIFICANT CHANGE UP (ref 3.5–5.3)
PROT SERPL-MCNC: 7.2 G/DL — SIGNIFICANT CHANGE UP (ref 6–8.3)
RBC # BLD: 6.25 M/UL — HIGH (ref 4.2–5.8)
RBC # FLD: 17.4 % — HIGH (ref 10.3–14.5)
SODIUM SERPL-SCNC: 130 MMOL/L — LOW (ref 135–145)
SPECIMEN SOURCE: SIGNIFICANT CHANGE UP
WBC # BLD: 6.75 K/UL — SIGNIFICANT CHANGE UP (ref 3.8–10.5)
WBC # FLD AUTO: 6.75 K/UL — SIGNIFICANT CHANGE UP (ref 3.8–10.5)

## 2023-08-25 PROCEDURE — 99233 SBSQ HOSP IP/OBS HIGH 50: CPT | Mod: GC

## 2023-08-25 RX ORDER — METOCLOPRAMIDE HCL 10 MG
10 TABLET ORAL ONCE
Refills: 0 | Status: COMPLETED | OUTPATIENT
Start: 2023-08-25 | End: 2023-08-25

## 2023-08-25 RX ORDER — SODIUM CHLORIDE 9 MG/ML
1000 INJECTION, SOLUTION INTRAVENOUS
Refills: 0 | Status: DISCONTINUED | OUTPATIENT
Start: 2023-08-25 | End: 2023-08-31

## 2023-08-25 RX ORDER — METOCLOPRAMIDE HCL 10 MG
10 TABLET ORAL EVERY 6 HOURS
Refills: 0 | Status: DISCONTINUED | OUTPATIENT
Start: 2023-08-25 | End: 2023-08-26

## 2023-08-25 RX ADMIN — Medication 30 MILLILITER(S): at 12:15

## 2023-08-25 RX ADMIN — Medication 650 MILLIGRAM(S): at 23:27

## 2023-08-25 RX ADMIN — SODIUM CHLORIDE 100 MILLILITER(S): 9 INJECTION, SOLUTION INTRAVENOUS at 15:00

## 2023-08-25 RX ADMIN — Medication 150 MICROGRAM(S): at 05:23

## 2023-08-25 RX ADMIN — Medication 650 MILLIGRAM(S): at 12:15

## 2023-08-25 RX ADMIN — ONDANSETRON 4 MILLIGRAM(S): 8 TABLET, FILM COATED ORAL at 12:15

## 2023-08-25 RX ADMIN — Medication 650 MILLIGRAM(S): at 13:00

## 2023-08-25 RX ADMIN — Medication 10 MILLIGRAM(S): at 18:40

## 2023-08-25 RX ADMIN — ONDANSETRON 4 MILLIGRAM(S): 8 TABLET, FILM COATED ORAL at 05:23

## 2023-08-25 RX ADMIN — Medication 650 MILLIGRAM(S): at 23:56

## 2023-08-25 NOTE — DIETITIAN INITIAL EVALUATION ADULT - PROBLEM SELECTOR PLAN 1
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 Patient underwent biopsy that did not yield bacterial data and refused discharge to [facility] for IV antibiotics while this was worked up. Patient with consistently low ESR/CRP/leukocytosis and no fevers. Repeat MR lumbar also showing OM vs discitis, though no progression of disease. History of back injections for pain management. Planned for dual isotope bone/gallium scan with SPECT CT with ID for differentiation.     - ID consult.   - Discuss efficacy of achieving inpatient dual isotope bone/gallium scan with SPECT CT.   - Would hold antibiotics at this time given prolonged duration off of antibiotic therapy without progression pending diagnostic workup.  - Continue home gabapentin dose.

## 2023-08-25 NOTE — PROGRESS NOTE ADULT - PROBLEM SELECTOR PLAN 4
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 Patient underwent biopsy that did not yield bacterial data and refused discharge to [facility] for IV antibiotics while this was worked up. Patient with consistently low ESR/CRP/leukocytosis and no fevers.   Repeat MR lumbar also showing OM vs discitis, though no progression of disease. History of back injections for pain management. Planned for dual isotope bone/gallium scan with SPECT CT with ID for differentiation.   Bx Culture: Gram positive cocci in clusters.   Plan:  -F/U sensitivities   - Likely follow up outpatient for dual isotope bone/gallium scan with SPECT CT and work up for back pain.   - Would hold antibiotics at this time given prolonged duration off of antibiotic therapy without progression  - Continue home gabapentin dose  - pain control

## 2023-08-25 NOTE — DIETITIAN INITIAL EVALUATION ADULT - PROBLEM SELECTOR PLAN 2
Unchanged severe left hydronephrosis with marked cortical thinning, Followed by Urology for this issue and outpatient planned for 08/14 but planned to be rescheduled with possible in hospital transition.     - Urology mad aware of patient.   - Procedure planned for 1400 8/21 will make paitent NPO, coags, type and screen, CXR, EKG.   - Monitor urine output.

## 2023-08-25 NOTE — PROGRESS NOTE ADULT - SUBJECTIVE AND OBJECTIVE BOX
**INCOMPLETE NOTE    OVERNIGHT EVENTS: None    SUBJECTIVE:  Patient seen and examined at bedside, comfortable, NAD. Denied fever, chest pain, dyspnea, abdominal pain.     Vital Signs Last 12 Hrs  T(F): 98.4 (08-25-23 @ 06:29), Max: 98.4 (08-25-23 @ 06:29)  HR: 97 (08-25-23 @ 06:29) (97 - 97)  BP: 131/88 (08-25-23 @ 06:29) (131/88 - 131/88)  BP(mean): --  RR: 18 (08-25-23 @ 06:29) (18 - 18)  SpO2: 100% (08-25-23 @ 06:29) (100% - 100%)  I&O's Summary      PHYSICAL EXAM:  Constitutional: NAD, comfortable in bed.  HEENT: NC/AT, PERRLA, EOMI, no conjunctival pallor or scleral icterus, MMM  Neck: Supple, no JVD  Respiratory: CTA B/L. No w/r/r.   Cardiovascular: RRR, normal S1 and S2, no m/r/g.   Gastrointestinal: +BS, soft NTND, no guarding or rebound tenderness, no palpable masses   Extremities: wwp; no cyanosis, clubbing or edema.   Vascular: Pulses equal and strong throughout.   Neurological: AAOx3, no CN deficits, strength and sensation intact throughout.   Skin: No gross skin abnormalities or rashes        LABS:                  RADIOLOGY & ADDITIONAL TESTS:    MEDICATIONS  (STANDING):  acetaminophen     Tablet .. 650 milliGRAM(s) Oral every 6 hours  aluminum hydroxide/magnesium hydroxide/simethicone Suspension 30 milliLiter(s) Oral once  lactated ringers. 1000 milliLiter(s) (100 mL/Hr) IV Continuous <Continuous>  levothyroxine 150 MICROGram(s) Oral daily  lidocaine   4% Patch 1 Patch Transdermal every 24 hours  ondansetron Injectable 4 milliGRAM(s) IV Push every 6 hours    MEDICATIONS  (PRN):  oxyCODONE    IR 2.5 milliGRAM(s) Oral every 6 hours PRN Moderate Pain (4 - 6)   OVERNIGHT EVENTS: Pt complained of nausea & gagging overnight but refused prn anti-emetics,     SUBJECTIVE:  Patient seen and examined at bedside, comfortable, NAD. Denied fever, chest pain, dyspnea, abdominal pain.     Vital Signs Last 12 Hrs  T(F): 98.4 (08-25-23 @ 06:29), Max: 98.4 (08-25-23 @ 06:29)  HR: 97 (08-25-23 @ 06:29) (97 - 97)  BP: 131/88 (08-25-23 @ 06:29) (131/88 - 131/88)  BP(mean): --  RR: 18 (08-25-23 @ 06:29) (18 - 18)  SpO2: 100% (08-25-23 @ 06:29) (100% - 100%)  I&O's Summary      PHYSICAL EXAM:  Constitutional: NAD, comfortable in bed.  HEENT: NC/AT, PERRLA, EOMI, no conjunctival pallor or scleral icterus, MMM  Neck: Supple, no JVD  Respiratory: CTA B/L. No w/r/r.   Cardiovascular: RRR, normal S1 and S2, no m/r/g.   Gastrointestinal: +BS, soft NTND, no guarding or rebound tenderness, no palpable masses   Extremities: wwp; no cyanosis, clubbing or edema.   Vascular: Pulses equal and strong throughout.   Neurological: AAOx3, no CN deficits, strength and sensation intact throughout.   Skin: No gross skin abnormalities or rashes        LABS:                  RADIOLOGY & ADDITIONAL TESTS:    MEDICATIONS  (STANDING):  acetaminophen     Tablet .. 650 milliGRAM(s) Oral every 6 hours  aluminum hydroxide/magnesium hydroxide/simethicone Suspension 30 milliLiter(s) Oral once  lactated ringers. 1000 milliLiter(s) (100 mL/Hr) IV Continuous <Continuous>  levothyroxine 150 MICROGram(s) Oral daily  lidocaine   4% Patch 1 Patch Transdermal every 24 hours  ondansetron Injectable 4 milliGRAM(s) IV Push every 6 hours    MEDICATIONS  (PRN):  oxyCODONE    IR 2.5 milliGRAM(s) Oral every 6 hours PRN Moderate Pain (4 - 6)   OVERNIGHT EVENTS: Pt complained of nausea & gagging overnight but refused prn anti-emetics, States he will agree now to am labs and prn zofran.     SUBJECTIVE:  Patient seen and examined at bedside, continues to complain of nausea and is observed to hiccup/ gag while speaking with writer. Signals over epigastric area as source of abdominal discomfort that typically precedes gagging/emesis. Gagging episode observed by writer, after which pt produced significant drooling that did not appear to contain emesis, which he collected in bedside basin. Of note, he did describe this action as "vomiting" and basin contents as "vomit." He denies having eaten overnight/this am. Pt denies fever, chest pain, dyspnea, abdominal pain.     Vital Signs Last 12 Hrs  T(F): 98.4 (08-25-23 @ 06:29), Max: 98.4 (08-25-23 @ 06:29)  HR: 97 (08-25-23 @ 06:29) (97 - 97)  BP: 131/88 (08-25-23 @ 06:29) (131/88 - 131/88)  BP(mean): --  RR: 18 (08-25-23 @ 06:29) (18 - 18)  SpO2: 100% (08-25-23 @ 06:29) (100% - 100%)  I&O's Summary      PHYSICAL EXAM:  Constitutional: pt in bed, under covers, keeping lower half of his face covered by blanket, NAD  HEENT: normocephalic, atraumatic; clear conjunctivae, EOM intact, PERRLA; external ears & canals non-tender, non-erythematous; mucous membranes pink, moist, without erythema or exudates, poor oral hygiene and dentition - missing numerous upper & lower teeth.   Neck: supple, without adenopathy; carotid pulses 2+ bilaterally; no JVD.   Cardiac: regular heart rate and rhythm, without murmurs, rubs, or gallops.   Respiratory: chest wall symmetric and atraumatic; no signs of respiratory distress; lung sounds clear in all lobes, without wheezes, rales, or rhonchi.   Abdominal: soft, non-tender, non-distended; bowel sounds present and normoactive in all quadrants.   Back: Left Percutaneous Nephrolithotomy in place, covered by clean, dry bandage, light-red/orange fluid collection unchanged from yesterday in amount & appearance      LABS:                        17.2   6.75  )-----------( 328      ( 25 Aug 2023 13:24 )             52.0     08-25    130<L>  |  97  |  19  ----------------------------<  77  5.0   |  19<L>  |  1.24    Ca    9.3      25 Aug 2023 11:24  Phos  3.2     08-25  Mg     1.9     08-25    TPro  7.2  /  Alb  3.1<L>  /  TBili  0.3  /  DBili  x   /  AST  21  /  ALT  7<L>  /  AlkPhos  79  08-25      Urinalysis Basic - ( 25 Aug 2023 11:24 )    Color: x / Appearance: x / SG: x / pH: x  Gluc: 77 mg/dL / Ketone: x  / Bili: x / Urobili: x   Blood: x / Protein: x / Nitrite: x   Leuk Esterase: x / RBC: x / WBC x   Sq Epi: x / Non Sq Epi: x / Bacteria: x          RADIOLOGY & ADDITIONAL TESTS:    MEDICATIONS  (STANDING):  acetaminophen     Tablet .. 650 milliGRAM(s) Oral every 6 hours  dextrose 5% + sodium chloride 0.9%. 1000 milliLiter(s) (100 mL/Hr) IV Continuous <Continuous>  levothyroxine 150 MICROGram(s) Oral daily  lidocaine   4% Patch 1 Patch Transdermal every 24 hours    MEDICATIONS  (PRN):  metoclopramide Injectable 10 milliGRAM(s) IV Push every 6 hours PRN N/V  oxyCODONE    IR 2.5 milliGRAM(s) Oral every 6 hours PRN Moderate Pain (4 - 6)                  RADIOLOGY & ADDITIONAL TESTS:    MEDICATIONS  (STANDING):  acetaminophen     Tablet .. 650 milliGRAM(s) Oral every 6 hours  aluminum hydroxide/magnesium hydroxide/simethicone Suspension 30 milliLiter(s) Oral once  lactated ringers. 1000 milliLiter(s) (100 mL/Hr) IV Continuous <Continuous>  levothyroxine 150 MICROGram(s) Oral daily  lidocaine   4% Patch 1 Patch Transdermal every 24 hours  ondansetron Injectable 4 milliGRAM(s) IV Push every 6 hours    MEDICATIONS  (PRN):  oxyCODONE    IR 2.5 milliGRAM(s) Oral every 6 hours PRN Moderate Pain (4 - 6)

## 2023-08-25 NOTE — DIETITIAN INITIAL EVALUATION ADULT - PERTINENT MEDS FT
MEDICATIONS  (STANDING):  acetaminophen     Tablet .. 650 milliGRAM(s) Oral every 6 hours  dextrose 5% + sodium chloride 0.9%. 1000 milliLiter(s) (100 mL/Hr) IV Continuous <Continuous>  levothyroxine 150 MICROGram(s) Oral daily  lidocaine   4% Patch 1 Patch Transdermal every 24 hours  ondansetron Injectable 4 milliGRAM(s) IV Push every 6 hours    MEDICATIONS  (PRN):  oxyCODONE    IR 2.5 milliGRAM(s) Oral every 6 hours PRN Moderate Pain (4 - 6)

## 2023-08-25 NOTE — DIETITIAN INITIAL EVALUATION ADULT - ADD RECOMMEND
1. Continue with regular diet. Anti-nausea/vomiting medications per team.  2. Encourage pt to meet nutritional needs as able   3. Monitor labs: electrolytes, cmp  4. Monitor weights   5. Pain and bowel regimen per team   6. Will continue to assess/honor food preferences as able

## 2023-08-25 NOTE — PROGRESS NOTE ADULT - SUBJECTIVE AND OBJECTIVE BOX
63M with a past medical history of chronic back pain with concerns for L4/L5 osteomyelitis, alcohol abuse without withdrawal history who was brought in to [facility] after being found sleeping on the ground intoxicated, admitted for further management of possible osteomyelitis and hydronephrosis. S/p attempted unsuccessful L URS on 8/21/23. Distal-mid ureter noted to be entirely obstructed vs obliterated with inability to pass wire or contrast retrograde beyond area of obstruction. Urology following, recommending nephrostomy placement. IR consulted for left PCN placed 8/22/23.    8/23: L PCN: 100 cc bloody output recorded for Aug 23. Flushed easily with 6 cc NS. Dressings CDI. IR will continue to follow.     8/24:OUTPUT FOR AUG 24 NOT RECORDED IN FLOWSHEETS. It is VERY IMPORTANT for output to be recorded in flowsheets.   On examination, output is still "punch colored".    8/25: OUTPUT looks "punch colored" on examination.   OUTPUT IS STILL NOT BEING RECORDED! Spoke with BOTH nursing team AND resident team about importance of output being recorded.

## 2023-08-25 NOTE — DIETITIAN INITIAL EVALUATION ADULT - OTHER INFO
63 year old male with a past medical history of chronic back pain with concerns for L4/L5 osteomyelitis, alcohol abuse without withdrawal history who was brought in to [facility] after being found sleeping on the ground intoxicated, admitted for further management of possible osteomyelitis and hydronephrosis.     Patient seen at bedside for length of stay assessment. Current diet order: regular. No known food allergies. No difficulty chewing/swallowing reported. Reports poor appetite due to nausea and vomiting since nephrostomy tube placement on 8/21. Patient did not touch dinner or breakfast trays. Reported last vomiting episode yesterday evening. Reports he was eating well PTA. Provided encouragement for PO intake. Dosing weight: 187 pounds, BMI 26.9. Question accuracy of dosing weight as patient reported 187# was too high and bed scale weight obtained by RD was 152#. Recommend to obtain new weight. 152# used to estimate needs. No pressure injuries documented. No edema documented. Hyponatremic. Meds: synthroid. Observed with no overt signs and symptoms of muscle or fat wasting. Based on ASPEN guidelines, pt does not meet criteria for malnutrition. No cultural, ethnic, Restorationism food preferences reported. See nutrition recommendations below.

## 2023-08-25 NOTE — PROGRESS NOTE ADULT - ASSESSMENT
Mr. Healy is a 63 year old male with a past medical history of chronic back pain with concerns for L4/L5 osteomyelitis, alcohol use without withdrawal history who was brought in to [facility] after being found sleeping on the ground intoxicated, admitted for further management of possible osteomyelitis and hydronephrosis, s/p L PCN placement.

## 2023-08-25 NOTE — PROGRESS NOTE ADULT - PROBLEM SELECTOR PLAN 3
pt continues to refuse anti-emetics. spoke at length about possible etiology of his current sx and value of being open to treatment  given hemoconcentration, poor po intake, and n/v, advised IVF again - pt initially agreed but subsequently removed his IV, writer spoke with pt again, agreed to restart IVF and try new antiemetic  - given hiccup-like gagging and pts impression that zofran is not improving his sx, will try reglan (shown to alleviate post-operative hiccups, should these be the etiology pt continues to refuse anti-emetics. spoke at length about possible etiology of his current sx and value of being open to treatment  given hemoconcentration, poor po intake, and n/v, advised IVF again - pt initially agreed but subsequently removed his IV, writer spoke with pt again, agreed to restart IVF and try new antiemetic  - given hiccup-like gagging and pts impression that zofran is not improving his sx, will try reglan (shown to alleviate post-operative hiccups, should these be the etiology          - repeat EKG 4pm: [id number] no prolongation

## 2023-08-25 NOTE — DIETITIAN INITIAL EVALUATION ADULT - PROBLEM SELECTOR PLAN 7
F: 2.3L NS.   E: Replete PRN.   N: Regular diet.   DVT ppx: Holding pending possible procedure.   Dispo: [facility].

## 2023-08-25 NOTE — DIETITIAN INITIAL EVALUATION ADULT - PERTINENT LABORATORY DATA
08-25    130<L>  |  97  |  19  ----------------------------<  77  5.0   |  19<L>  |  1.24    Ca    9.3      25 Aug 2023 11:24  Phos  3.2     08-25  Mg     1.9     08-25    TPro  7.2  /  Alb  3.1<L>  /  TBili  0.3  /  DBili  x   /  AST  21  /  ALT  7<L>  /  AlkPhos  79  08-25

## 2023-08-25 NOTE — PROGRESS NOTE ADULT - PROBLEM SELECTOR PLAN 2
Stable CKD stage 3 since admission at last hospitalization, was seen by DR. Aldana in the outpatient setting. Planned to undergo above procedure to improve function of left kidney.     - Renally dose medications  - Care with nephrotoxic medications

## 2023-08-25 NOTE — DIETITIAN INITIAL EVALUATION ADULT - PROBLEM SELECTOR PLAN 3
Patient underwent a fall after drinking alcohol today, similar to presentation in April. At that time CT head was negative, patient denies head trauma or head pain at this time but is unable to remember the event. AOX3 at this time.     - Pending EKG  - PT in AM.  - Would defer head CT at this time given no evidence of acute injury, neurologic deficit, or blood thinners.

## 2023-08-25 NOTE — PROGRESS NOTE ADULT - PROBLEM SELECTOR PLAN 1
Unchanged severe left hydronephrosis with marked cortical thinning, left ureteroscopy unsuccessful per Urology, s/p left PCN placement  - Urology, IR following, appreciate recommendations  - Monitor urine output  - Analgesia for pain control

## 2023-08-26 LAB
ALBUMIN SERPL ELPH-MCNC: 3.4 G/DL — SIGNIFICANT CHANGE UP (ref 3.3–5)
ALP SERPL-CCNC: 70 U/L — SIGNIFICANT CHANGE UP (ref 40–120)
ALT FLD-CCNC: <5 U/L — LOW (ref 10–45)
ANION GAP SERPL CALC-SCNC: 13 MMOL/L — SIGNIFICANT CHANGE UP (ref 5–17)
AST SERPL-CCNC: 12 U/L — SIGNIFICANT CHANGE UP (ref 10–40)
BASOPHILS # BLD AUTO: 0.04 K/UL — SIGNIFICANT CHANGE UP (ref 0–0.2)
BASOPHILS NFR BLD AUTO: 0.6 % — SIGNIFICANT CHANGE UP (ref 0–2)
BILIRUB SERPL-MCNC: 0.3 MG/DL — SIGNIFICANT CHANGE UP (ref 0.2–1.2)
BUN SERPL-MCNC: 19 MG/DL — SIGNIFICANT CHANGE UP (ref 7–23)
CALCIUM SERPL-MCNC: 9.3 MG/DL — SIGNIFICANT CHANGE UP (ref 8.4–10.5)
CHLORIDE SERPL-SCNC: 99 MMOL/L — SIGNIFICANT CHANGE UP (ref 96–108)
CO2 SERPL-SCNC: 25 MMOL/L — SIGNIFICANT CHANGE UP (ref 22–31)
CREAT SERPL-MCNC: 1.48 MG/DL — HIGH (ref 0.5–1.3)
CULTURE RESULTS: SIGNIFICANT CHANGE UP
EGFR: 53 ML/MIN/1.73M2 — LOW
EOSINOPHIL # BLD AUTO: 0.03 K/UL — SIGNIFICANT CHANGE UP (ref 0–0.5)
EOSINOPHIL NFR BLD AUTO: 0.4 % — SIGNIFICANT CHANGE UP (ref 0–6)
GLUCOSE SERPL-MCNC: 102 MG/DL — HIGH (ref 70–99)
HCT VFR BLD CALC: 47.7 % — SIGNIFICANT CHANGE UP (ref 39–50)
HGB BLD-MCNC: 15.6 G/DL — SIGNIFICANT CHANGE UP (ref 13–17)
IMM GRANULOCYTES NFR BLD AUTO: 0.3 % — SIGNIFICANT CHANGE UP (ref 0–0.9)
LYMPHOCYTES # BLD AUTO: 1.86 K/UL — SIGNIFICANT CHANGE UP (ref 1–3.3)
LYMPHOCYTES # BLD AUTO: 25.8 % — SIGNIFICANT CHANGE UP (ref 13–44)
MAGNESIUM SERPL-MCNC: 2 MG/DL — SIGNIFICANT CHANGE UP (ref 1.6–2.6)
MCHC RBC-ENTMCNC: 27.1 PG — SIGNIFICANT CHANGE UP (ref 27–34)
MCHC RBC-ENTMCNC: 32.7 GM/DL — SIGNIFICANT CHANGE UP (ref 32–36)
MCV RBC AUTO: 82.8 FL — SIGNIFICANT CHANGE UP (ref 80–100)
MONOCYTES # BLD AUTO: 0.97 K/UL — HIGH (ref 0–0.9)
MONOCYTES NFR BLD AUTO: 13.5 % — SIGNIFICANT CHANGE UP (ref 2–14)
NEUTROPHILS # BLD AUTO: 4.28 K/UL — SIGNIFICANT CHANGE UP (ref 1.8–7.4)
NEUTROPHILS NFR BLD AUTO: 59.4 % — SIGNIFICANT CHANGE UP (ref 43–77)
NRBC # BLD: 0 /100 WBCS — SIGNIFICANT CHANGE UP (ref 0–0)
PHOSPHATE SERPL-MCNC: 3.2 MG/DL — SIGNIFICANT CHANGE UP (ref 2.5–4.5)
PLATELET # BLD AUTO: 337 K/UL — SIGNIFICANT CHANGE UP (ref 150–400)
POTASSIUM SERPL-MCNC: 4.1 MMOL/L — SIGNIFICANT CHANGE UP (ref 3.5–5.3)
POTASSIUM SERPL-SCNC: 4.1 MMOL/L — SIGNIFICANT CHANGE UP (ref 3.5–5.3)
PROT SERPL-MCNC: 6.6 G/DL — SIGNIFICANT CHANGE UP (ref 6–8.3)
RBC # BLD: 5.76 M/UL — SIGNIFICANT CHANGE UP (ref 4.2–5.8)
RBC # FLD: 17.5 % — HIGH (ref 10.3–14.5)
SODIUM SERPL-SCNC: 137 MMOL/L — SIGNIFICANT CHANGE UP (ref 135–145)
SPECIMEN SOURCE: SIGNIFICANT CHANGE UP
WBC # BLD: 7.2 K/UL — SIGNIFICANT CHANGE UP (ref 3.8–10.5)
WBC # FLD AUTO: 7.2 K/UL — SIGNIFICANT CHANGE UP (ref 3.8–10.5)

## 2023-08-26 PROCEDURE — 74019 RADEX ABDOMEN 2 VIEWS: CPT | Mod: 26

## 2023-08-26 PROCEDURE — 99233 SBSQ HOSP IP/OBS HIGH 50: CPT | Mod: GC

## 2023-08-26 RX ORDER — ENOXAPARIN SODIUM 100 MG/ML
40 INJECTION SUBCUTANEOUS EVERY 24 HOURS
Refills: 0 | Status: DISCONTINUED | OUTPATIENT
Start: 2023-08-26 | End: 2023-09-02

## 2023-08-26 RX ORDER — ONDANSETRON 8 MG/1
4 TABLET, FILM COATED ORAL ONCE
Refills: 0 | Status: COMPLETED | OUTPATIENT
Start: 2023-08-26 | End: 2023-08-26

## 2023-08-26 RX ORDER — ONDANSETRON 8 MG/1
4 TABLET, FILM COATED ORAL ONCE
Refills: 0 | Status: DISCONTINUED | OUTPATIENT
Start: 2023-08-26 | End: 2023-08-28

## 2023-08-26 RX ORDER — SENNA PLUS 8.6 MG/1
2 TABLET ORAL AT BEDTIME
Refills: 0 | Status: DISCONTINUED | OUTPATIENT
Start: 2023-08-26 | End: 2023-08-27

## 2023-08-26 RX ORDER — LANOLIN ALCOHOL/MO/W.PET/CERES
3 CREAM (GRAM) TOPICAL AT BEDTIME
Refills: 0 | Status: DISCONTINUED | OUTPATIENT
Start: 2023-08-26 | End: 2023-09-02

## 2023-08-26 RX ADMIN — Medication 5 MILLIGRAM(S): at 23:54

## 2023-08-26 RX ADMIN — ONDANSETRON 4 MILLIGRAM(S): 8 TABLET, FILM COATED ORAL at 03:11

## 2023-08-26 RX ADMIN — SENNA PLUS 2 TABLET(S): 8.6 TABLET ORAL at 23:54

## 2023-08-26 RX ADMIN — Medication 3 MILLIGRAM(S): at 23:54

## 2023-08-26 NOTE — PROGRESS NOTE ADULT - PROBLEM SELECTOR PLAN 6
Diagnosed in April on Synthroid 150 mcg daily.   -TSH: 26.13  -Endo consulted  -continue home synthroid as prescribed Patient with history of intermittent alcohol abuse, frequent falls while using alcohol. Currently p/w 6 alcoholic beverages today, Denies history of withdrawal, DTs, or seizure.     - Continue to monitor symptomatically.    # Protein gap with lytic lesions: Previously worked up with SPEP/UPEP and found to be within normal limits.

## 2023-08-26 NOTE — PROGRESS NOTE ADULT - SUBJECTIVE AND OBJECTIVE BOX
OVERNIGHT EVENTS: Pt refused medications & pulled out IV    SUBJECTIVE:  Patient seen and examined at bedside, comfortable, NAD. Denied fever, chest pain, dyspnea, abdominal pain. ****    Vital Signs Last 12 Hrs  T(F): 99 (08-26-23 @ 06:32), Max: 99 (08-26-23 @ 06:32)  HR: 88 (08-26-23 @ 06:32) (82 - 88)  BP: 122/88 (08-26-23 @ 06:32) (122/88 - 142/88)  BP(mean): --  RR: 18 (08-26-23 @ 06:32) (18 - 18)  SpO2: 95% (08-26-23 @ 06:32) (95% - 95%)  I&O's Summary    25 Aug 2023 07:01  -  26 Aug 2023 06:45  --------------------------------------------------------  IN: 400 mL / OUT: 200 mL / NET: 200 mL        PHYSICAL EXAM:  Constitutional: pt in bed, NAD  HEENT: normocephalic, atraumatic; clear conjunctivae, EOM intact, PERRLA; external ears & canals non-tender, non-erythematous; mucous membranes pink, moist, without erythema or exudates, poor oral hygiene and dentition - missing numerous upper & lower teeth.   Neck: supple, without adenopathy; carotid pulses 2+ bilaterally; no JVD.   Cardiac: regular heart rate and rhythm, without murmurs, rubs, or gallops.   Respiratory: chest wall symmetric and atraumatic; no signs of respiratory distress; lung sounds clear in all lobes, without wheezes, rales, or rhonchi.   Abdominal: soft, non-tender, non-distended; bowel sounds present and normoactive in all quadrants.   Back: Left Percutaneous Nephrolithotomy in place, covered by clean, dry bandage, light-red/orange fluid collection approx *** cc, fruit-punch-colored        LABS:                        17.2   6.75  )-----------( 328      ( 25 Aug 2023 13:24 )             52.0     08-25    130<L>  |  97  |  19  ----------------------------<  77  5.0   |  19<L>  |  1.24    Ca    9.3      25 Aug 2023 11:24  Phos  3.2     08-25  Mg     1.9     08-25    TPro  7.2  /  Alb  3.1<L>  /  TBili  0.3  /  DBili  x   /  AST  21  /  ALT  7<L>  /  AlkPhos  79  08-25      Urinalysis Basic - ( 25 Aug 2023 11:24 )    Color: x / Appearance: x / SG: x / pH: x  Gluc: 77 mg/dL / Ketone: x  / Bili: x / Urobili: x   Blood: x / Protein: x / Nitrite: x   Leuk Esterase: x / RBC: x / WBC x   Sq Epi: x / Non Sq Epi: x / Bacteria: x          RADIOLOGY & ADDITIONAL TESTS:    MEDICATIONS  (STANDING):  acetaminophen     Tablet .. 650 milliGRAM(s) Oral every 6 hours  dextrose 5% + sodium chloride 0.9%. 1000 milliLiter(s) (100 mL/Hr) IV Continuous <Continuous>  levothyroxine 150 MICROGram(s) Oral daily  lidocaine   4% Patch 1 Patch Transdermal every 24 hours    MEDICATIONS  (PRN):  oxyCODONE    IR 2.5 milliGRAM(s) Oral every 6 hours PRN Moderate Pain (4 - 6)   OVERNIGHT EVENTS: Pt refused medications & pulled out IV.     SUBJECTIVE:  Patient seen and examined at bedside, continues to endorse nausea and inability to eat. His meals are noted to generally go untouched. Pt clarifies that he is not eating because he has no appetite and feels nauseated when attempting to do so, and that he has not managed to want to eat more than a few bites (eg, he is not "unable to keep anything down," but rather has not been able to eat more than a few bites due to nausea).    Vital Signs Last 12 Hrs  T(F): 99 (08-26-23 @ 06:32), Max: 99 (08-26-23 @ 06:32)  HR: 88 (08-26-23 @ 06:32) (82 - 88)  BP: 122/88 (08-26-23 @ 06:32) (122/88 - 142/88)  BP(mean): --  RR: 18 (08-26-23 @ 06:32) (18 - 18)  SpO2: 95% (08-26-23 @ 06:32) (95% - 95%)  I&O's Summary    25 Aug 2023 07:01  -  26 Aug 2023 06:45  --------------------------------------------------------  IN: 400 mL / OUT: 200 mL / NET: 200 mL        PHYSICAL EXAM:  Constitutional: pt curled up in bed, under covers, with basin at bedside, some emesis  HEENT: normocephalic, atraumatic; mucous membranes pink, mildly dry, without erythema or exudates, poor oral hygiene and dentition - missing numerous upper & lower teeth.   Neck: supple, without adenopathy; carotid pulses 2+ bilaterally; no JVD.   Cardiac: regular heart rate and rhythm, without murmurs, rubs, or gallops.   Respiratory: chest wall symmetric and atraumatic; no signs of respiratory distress; lung sounds clear in all lobes, without wheezes, rales, or rhonchi.   Abdominal: soft, non-tender, non-distended  Back: Left Percutaneous Nephrolithotomy in place, covered by clean, dry bandage, light-red/orange fluid collection, fruit-punch-colored        LABS:                        17.2   6.75  )-----------( 328      ( 25 Aug 2023 13:24 )             52.0     08-25    130<L>  |  97  |  19  ----------------------------<  77  5.0   |  19<L>  |  1.24    Ca    9.3      25 Aug 2023 11:24  Phos  3.2     08-25  Mg     1.9     08-25    TPro  7.2  /  Alb  3.1<L>  /  TBili  0.3  /  DBili  x   /  AST  21  /  ALT  7<L>  /  AlkPhos  79  08-25      Urinalysis Basic - ( 25 Aug 2023 11:24 )    Color: x / Appearance: x / SG: x / pH: x  Gluc: 77 mg/dL / Ketone: x  / Bili: x / Urobili: x   Blood: x / Protein: x / Nitrite: x   Leuk Esterase: x / RBC: x / WBC x   Sq Epi: x / Non Sq Epi: x / Bacteria: x          RADIOLOGY & ADDITIONAL TESTS:    MEDICATIONS  (STANDING):  acetaminophen     Tablet .. 650 milliGRAM(s) Oral every 6 hours  dextrose 5% + sodium chloride 0.9%. 1000 milliLiter(s) (100 mL/Hr) IV Continuous <Continuous>  levothyroxine 150 MICROGram(s) Oral daily  lidocaine   4% Patch 1 Patch Transdermal every 24 hours    MEDICATIONS  (PRN):  oxyCODONE    IR 2.5 milliGRAM(s) Oral every 6 hours PRN Moderate Pain (4 - 6)

## 2023-08-26 NOTE — PROGRESS NOTE ADULT - PROBLEM SELECTOR PLAN 5
Patient underwent a fall after drinking alcohol today, similar to presentation in April. At that time CT head was negative, patient denies head trauma or head pain at this time but is unable to remember the event. AOX3 at this time.     - PT   - Would defer head CT at this time given no evidence of acute injury, neurologic deficit, or blood thinners. Diagnosed in April on Synthroid 150 mcg daily.   - TSH: 26.13  - Endo consulted  - continue home synthroid as prescribed

## 2023-08-26 NOTE — PROGRESS NOTE ADULT - SUBJECTIVE AND OBJECTIVE BOX
63M with a past medical history of chronic back pain with concerns for L4/L5 osteomyelitis, alcohol abuse without withdrawal history who was brought in to [facility] after being found sleeping on the ground intoxicated, admitted for further management of possible osteomyelitis and hydronephrosis. S/p attempted unsuccessful L URS on 8/21/23. Distal-mid ureter noted to be entirely obstructed vs obliterated with inability to pass wire or contrast retrograde beyond area of obstruction. Urology following, recommending nephrostomy placement. IR consulted for left PCN placed 8/22/23.    8/23: L PCN: 100 cc bloody output recorded for Aug 23. Flushed easily with 6 cc NS. Dressings CDI. IR will continue to follow.     8/24:OUTPUT FOR AUG 24 NOT RECORDED IN FLOWSHEETS. It is VERY IMPORTANT for output to be recorded in flowsheets.   On examination, output is still "punch colored".    8/25: OUTPUT looks "punch colored" on examination.     8/26: 200 cc of output recorded over last 24 hrs. Continues to be a light red/fruit punch-colored output. Dressing CDI. Plan continue to monitor output. IR will continue to follow.

## 2023-08-26 NOTE — PROGRESS NOTE ADULT - PROBLEM SELECTOR PLAN 3
pt continues to refuse anti-emetics. spoke at length about possible etiology of his current sx and value of being open to treatment  given hemoconcentration, poor po intake, and n/v, advised IVF again - pt initially agreed but subsequently removed his IV, writer spoke with pt again, agreed to restart IVF and try new antiemetic  - given hiccup-like gagging and pts impression that zofran is not improving his sx, will try reglan (shown to alleviate post-operative hiccups, should these be the etiology          - repeat EKG 4pm: [id number] no prolongation - upright KUB with substantial constipation  - bowel regimen in am, consider enema  - consider GI consult  - consider CT abd/pelvis with contrast  - continue reglan prn and daily EKGs (most recent [id number] no prolongation) - upright KUB with substantial constipation  - bowel regimen in am, consider enema  - consider GI consult  - consider CT abd/pelvis with contrast  - continue reglan prn and daily EKGs (most recent [id number] no prolongation)    - palliative care consult (sx management, intractable nausea/vomiting, reduced mobility)

## 2023-08-26 NOTE — PROGRESS NOTE ADULT - PROBLEM SELECTOR PLAN 7
Patient with history of intermittent alcohol abuse, frequent falls while using alcohol. Currently p/w 6 alcoholic beverages today, Denies history of withdrawal, DTs, or seizure.     - Continue to monitor symptomatically.    # Protein gap with lytic lesions: Previously worked up with SPEP/UPEP and found to be within normal limits. F: 2.3L NS.   E: Replete PRN.   N: Regular diet.   DVT ppx: Holding pending possible procedure.   Dispo:

## 2023-08-26 NOTE — PROGRESS NOTE ADULT - PROBLEM SELECTOR PLAN 4
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 Patient underwent biopsy that did not yield bacterial data and refused discharge to [facility] for IV antibiotics while this was worked up. Patient with consistently low ESR/CRP/leukocytosis and no fevers.   Repeat MR lumbar also showing OM vs discitis, though no progression of disease. History of back injections for pain management. Planned for dual isotope bone/gallium scan with SPECT CT with ID for differentiation.   Bx Culture: Gram positive cocci in clusters.   Plan:  -F/U sensitivities   - Likely follow up outpatient for dual isotope bone/gallium scan with SPECT CT and work up for back pain.   - Would hold antibiotics at this time given prolonged duration off of antibiotic therapy without progression  - Continue home gabapentin dose  - pain control Patient with chronic back pain and known destruction at L4-L5, with concerning imaging findings for osteomyelitis of the area. Patient underwent biopsy that did not yield bacterial data and refused discharge to [facility] for IV antibiotics while this was worked up. Patient with consistently low ESR/CRP/leukocytosis and no fevers.   Repeat MR lumbar also showing OM vs discitis, though no progression of disease. History of back injections for pain management. Planned for dual isotope bone/gallium scan with SPECT CT with ID for differentiation.   Bx Culture: Gram positive cocci in clusters.   Plan:  - F/U sensitivities   - Likely follow up outpatient for dual isotope bone/gallium scan with SPECT CT and work up for back pain.   - Would hold antibiotics at this time given prolonged duration off of antibiotic therapy without progression  - Continue home gabapentin dose  - pain control

## 2023-08-26 NOTE — PROGRESS NOTE ADULT - ASSESSMENT
Mr. Healy is a 63 year old male with a past medical history of chronic back pain with concerns for L4/L5 osteomyelitis, alcohol use without withdrawal history who was brought in to [facility] after being found sleeping on the ground intoxicated, admitted for further management of possible osteomyelitis and hydronephrosis, s/p L PCN placement.

## 2023-08-27 LAB
ALBUMIN SERPL ELPH-MCNC: 3.4 G/DL — SIGNIFICANT CHANGE UP (ref 3.3–5)
ALP SERPL-CCNC: 68 U/L — SIGNIFICANT CHANGE UP (ref 40–120)
ALT FLD-CCNC: 6 U/L — LOW (ref 10–45)
ANION GAP SERPL CALC-SCNC: 10 MMOL/L — SIGNIFICANT CHANGE UP (ref 5–17)
AST SERPL-CCNC: 16 U/L — SIGNIFICANT CHANGE UP (ref 10–40)
BASOPHILS # BLD AUTO: 0.06 K/UL — SIGNIFICANT CHANGE UP (ref 0–0.2)
BASOPHILS NFR BLD AUTO: 1 % — SIGNIFICANT CHANGE UP (ref 0–2)
BILIRUB SERPL-MCNC: 0.3 MG/DL — SIGNIFICANT CHANGE UP (ref 0.2–1.2)
BUN SERPL-MCNC: 22 MG/DL — SIGNIFICANT CHANGE UP (ref 7–23)
CALCIUM SERPL-MCNC: 8.9 MG/DL — SIGNIFICANT CHANGE UP (ref 8.4–10.5)
CHLORIDE SERPL-SCNC: 100 MMOL/L — SIGNIFICANT CHANGE UP (ref 96–108)
CO2 SERPL-SCNC: 25 MMOL/L — SIGNIFICANT CHANGE UP (ref 22–31)
CREAT SERPL-MCNC: 1.44 MG/DL — HIGH (ref 0.5–1.3)
CULTURE RESULTS: SIGNIFICANT CHANGE UP
CULTURE RESULTS: SIGNIFICANT CHANGE UP
EGFR: 55 ML/MIN/1.73M2 — LOW
EOSINOPHIL # BLD AUTO: 0.11 K/UL — SIGNIFICANT CHANGE UP (ref 0–0.5)
EOSINOPHIL NFR BLD AUTO: 1.9 % — SIGNIFICANT CHANGE UP (ref 0–6)
GLUCOSE SERPL-MCNC: 97 MG/DL — SIGNIFICANT CHANGE UP (ref 70–99)
HCT VFR BLD CALC: 47.5 % — SIGNIFICANT CHANGE UP (ref 39–50)
HGB BLD-MCNC: 15.6 G/DL — SIGNIFICANT CHANGE UP (ref 13–17)
IMM GRANULOCYTES NFR BLD AUTO: 0.5 % — SIGNIFICANT CHANGE UP (ref 0–0.9)
LYMPHOCYTES # BLD AUTO: 1.82 K/UL — SIGNIFICANT CHANGE UP (ref 1–3.3)
LYMPHOCYTES # BLD AUTO: 31.4 % — SIGNIFICANT CHANGE UP (ref 13–44)
MAGNESIUM SERPL-MCNC: 2 MG/DL — SIGNIFICANT CHANGE UP (ref 1.6–2.6)
MCHC RBC-ENTMCNC: 27.3 PG — SIGNIFICANT CHANGE UP (ref 27–34)
MCHC RBC-ENTMCNC: 32.8 GM/DL — SIGNIFICANT CHANGE UP (ref 32–36)
MCV RBC AUTO: 83 FL — SIGNIFICANT CHANGE UP (ref 80–100)
MONOCYTES # BLD AUTO: 0.72 K/UL — SIGNIFICANT CHANGE UP (ref 0–0.9)
MONOCYTES NFR BLD AUTO: 12.4 % — SIGNIFICANT CHANGE UP (ref 2–14)
NEUTROPHILS # BLD AUTO: 3.05 K/UL — SIGNIFICANT CHANGE UP (ref 1.8–7.4)
NEUTROPHILS NFR BLD AUTO: 52.8 % — SIGNIFICANT CHANGE UP (ref 43–77)
NRBC # BLD: 0 /100 WBCS — SIGNIFICANT CHANGE UP (ref 0–0)
PHOSPHATE SERPL-MCNC: 3.2 MG/DL — SIGNIFICANT CHANGE UP (ref 2.5–4.5)
PLATELET # BLD AUTO: 309 K/UL — SIGNIFICANT CHANGE UP (ref 150–400)
POTASSIUM SERPL-MCNC: 4.2 MMOL/L — SIGNIFICANT CHANGE UP (ref 3.5–5.3)
POTASSIUM SERPL-SCNC: 4.2 MMOL/L — SIGNIFICANT CHANGE UP (ref 3.5–5.3)
PROT SERPL-MCNC: 6.6 G/DL — SIGNIFICANT CHANGE UP (ref 6–8.3)
RBC # BLD: 5.72 M/UL — SIGNIFICANT CHANGE UP (ref 4.2–5.8)
RBC # FLD: 16.6 % — HIGH (ref 10.3–14.5)
SODIUM SERPL-SCNC: 135 MMOL/L — SIGNIFICANT CHANGE UP (ref 135–145)
SPECIMEN SOURCE: SIGNIFICANT CHANGE UP
SPECIMEN SOURCE: SIGNIFICANT CHANGE UP
WBC # BLD: 5.79 K/UL — SIGNIFICANT CHANGE UP (ref 3.8–10.5)
WBC # FLD AUTO: 5.79 K/UL — SIGNIFICANT CHANGE UP (ref 3.8–10.5)

## 2023-08-27 PROCEDURE — 99233 SBSQ HOSP IP/OBS HIGH 50: CPT | Mod: GC

## 2023-08-27 PROCEDURE — 74176 CT ABD & PELVIS W/O CONTRAST: CPT | Mod: 26

## 2023-08-27 RX ORDER — LACTULOSE 10 G/15ML
10 SOLUTION ORAL DAILY
Refills: 0 | Status: DISCONTINUED | OUTPATIENT
Start: 2023-08-27 | End: 2023-08-27

## 2023-08-27 RX ORDER — POLYETHYLENE GLYCOL 3350 17 G/17G
17 POWDER, FOR SOLUTION ORAL EVERY 24 HOURS
Refills: 0 | Status: DISCONTINUED | OUTPATIENT
Start: 2023-08-27 | End: 2023-08-28

## 2023-08-27 RX ORDER — POLYETHYLENE GLYCOL 3350 17 G/17G
17 POWDER, FOR SOLUTION ORAL
Refills: 0 | Status: DISCONTINUED | OUTPATIENT
Start: 2023-08-27 | End: 2023-08-27

## 2023-08-27 RX ADMIN — Medication 3 MILLIGRAM(S): at 22:52

## 2023-08-27 RX ADMIN — ENOXAPARIN SODIUM 40 MILLIGRAM(S): 100 INJECTION SUBCUTANEOUS at 17:59

## 2023-08-27 RX ADMIN — LACTULOSE 10 GRAM(S): 10 SOLUTION ORAL at 13:13

## 2023-08-27 RX ADMIN — Medication 150 MICROGRAM(S): at 05:44

## 2023-08-27 NOTE — PROGRESS NOTE ADULT - SUBJECTIVE AND OBJECTIVE BOX
OVERNIGHT EVENTS: ALIRIO    SUBJECTIVE:  Patient seen and examined at bedside, continues to endorse nausea. His meals are noted to generally go untouched. Pt clarifies that he is not eating because he has no appetite and feels nauseated when attempting to do so, and that he has not managed to want to eat more than a few bites (eg, he is not "unable to keep anything down," but rather has not been able to eat more than a few bites due to nausea). Denies SOB, CP. ROS is otherwise negative.         Vital Signs Last 24 Hrs  T(C): 36.8 (27 Aug 2023 09:18), Max: 37.2 (27 Aug 2023 05:19)  T(F): 98.2 (27 Aug 2023 09:18), Max: 98.9 (27 Aug 2023 05:19)  HR: 77 (27 Aug 2023 09:18) (77 - 94)  BP: 132/97 (27 Aug 2023 09:18) (115/83 - 140/87)  BP(mean): 108 (27 Aug 2023 09:18) (108 - 108)  RR: 18 (27 Aug 2023 09:18) (18 - 18)  SpO2: 97% (27 Aug 2023 09:18) (97% - 98%)    Parameters below as of 27 Aug 2023 09:18  Patient On (Oxygen Delivery Method): room air            PHYSICAL EXAM:  Constitutional: pt curled up in bed, under covers, with basin at bedside, some emesis  HEENT: normocephalic, atraumatic; mucous membranes pink, mildly dry, without erythema or exudates, poor oral hygiene and dentition - missing numerous upper & lower teeth.   Neck: supple, without adenopathy; carotid pulses 2+ bilaterally; no JVD.   Cardiac: regular heart rate and rhythm, without murmurs, rubs, or gallops.   Respiratory: chest wall symmetric and atraumatic; no signs of respiratory distress; lung sounds clear in all lobes, without wheezes, rales, or rhonchi.   Abdominal: soft, non-tender, non-distended, + BS  Back: Left Percutaneous Nephrolithotomy in place, covered by clean, dry bandage, light-red/orange fluid collection, fruit-punch-colored  Psych: normal affect  MSK: no joint swelling        LABS:                                   15.6   5.79  )-----------( 309      ( 27 Aug 2023 06:36 )             47.5   08-27    135  |  100  |  22  ----------------------------<  97  4.2   |  25  |  1.44<H>    Ca    8.9      27 Aug 2023 06:36  Phos  3.2     08-27  Mg     2.0     08-27    TPro  6.6  /  Alb  3.4  /  TBili  0.3  /  DBili  x   /  AST  16  /  ALT  6<L>  /  AlkPhos  68  08-27        Urinalysis Basic - ( 25 Aug 2023 11:24 )    Color: x / Appearance: x / SG: x / pH: x  Gluc: 77 mg/dL / Ketone: x  / Bili: x / Urobili: x   Blood: x / Protein: x / Nitrite: x   Leuk Esterase: x / RBC: x / WBC x   Sq Epi: x / Non Sq Epi: x / Bacteria: x          RADIOLOGY & ADDITIONAL TESTS:    MEDICATIONS  (STANDING):  acetaminophen     Tablet .. 650 milliGRAM(s) Oral every 6 hours  dextrose 5% + sodium chloride 0.9%. 1000 milliLiter(s) (100 mL/Hr) IV Continuous <Continuous>  levothyroxine 150 MICROGram(s) Oral daily  lidocaine   4% Patch 1 Patch Transdermal every 24 hours    MEDICATIONS  (PRN):  oxyCODONE    IR 2.5 milliGRAM(s) Oral every 6 hours PRN Moderate Pain (4 - 6)

## 2023-08-27 NOTE — PROGRESS NOTE ADULT - ASSESSMENT
Mr. Healy is a 63 year old male with a past medical history of chronic back pain with concerns for L4/L5 osteomyelitis, alcohol use without withdrawal history who was brought in to [facility] after being found sleeping on the ground intoxicated, admitted for further management of possible osteomyelitis and hydronephrosis, s/p L PCN placement.

## 2023-08-27 NOTE — PROGRESS NOTE ADULT - PROBLEM SELECTOR PLAN 5
Diagnosed in April on Synthroid 150 mcg daily.   - TSH: 26.13  - Endo consulted  - continue home synthroid as prescribed

## 2023-08-27 NOTE — PROGRESS NOTE ADULT - PROBLEM SELECTOR PLAN 4
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 Patient underwent biopsy that did not yield bacterial data and refused discharge to [facility] for IV antibiotics while this was worked up. Patient with consistently low ESR/CRP/leukocytosis and no fevers.   Repeat MR lumbar also showing OM vs discitis, though no progression of disease. History of back injections for pain management. Planned for dual isotope bone/gallium scan with SPECT CT with ID for differentiation.   Bx Culture: Gram positive cocci in clusters.   Plan:  - F/U sensitivities   - Likely follow up outpatient for dual isotope bone/gallium scan with SPECT CT and work up for back pain.   - Would hold antibiotics at this time given prolonged duration off of antibiotic therapy without progression  - Continue home gabapentin dose  - pain control

## 2023-08-27 NOTE — PROGRESS NOTE ADULT - PROBLEM SELECTOR PLAN 6
Spoke with [facility]. They will add CV on to follow KOMAL @ [facility]. Patient with history of intermittent alcohol abuse, frequent falls while using alcohol. Currently p/w 6 alcoholic beverages today, Denies history of withdrawal, DTs, or seizure.     - Continue to monitor symptomatically.    # Protein gap with lytic lesions: Previously worked up with SPEP/UPEP and found to be within normal limits.

## 2023-08-27 NOTE — PROGRESS NOTE ADULT - PROBLEM SELECTOR PLAN 1
- upright KUB with substantial constipation  - bowel regimen in am, consider enema  - consider GI consult  - consider CT abd/pelvis with contrast  - continue reglan prn and daily EKGs (most recent [id number] no prolongation)    - palliative care consult (sx management, intractable nausea/vomiting, reduced mobility)

## 2023-08-28 DIAGNOSIS — Z02.9 ENCOUNTER FOR ADMINISTRATIVE EXAMINATIONS, UNSPECIFIED: ICD-10-CM

## 2023-08-28 DIAGNOSIS — Z51.5 ENCOUNTER FOR PALLIATIVE CARE: ICD-10-CM

## 2023-08-28 DIAGNOSIS — Z71.89 OTHER SPECIFIED COUNSELING: ICD-10-CM

## 2023-08-28 DIAGNOSIS — R53.81 OTHER MALAISE: ICD-10-CM

## 2023-08-28 DIAGNOSIS — K59.00 CONSTIPATION, UNSPECIFIED: ICD-10-CM

## 2023-08-28 LAB
ALBUMIN SERPL ELPH-MCNC: 3.3 G/DL — SIGNIFICANT CHANGE UP (ref 3.3–5)
ALP SERPL-CCNC: 68 U/L — SIGNIFICANT CHANGE UP (ref 40–120)
ALT FLD-CCNC: 10 U/L — SIGNIFICANT CHANGE UP (ref 10–45)
ANION GAP SERPL CALC-SCNC: 8 MMOL/L — SIGNIFICANT CHANGE UP (ref 5–17)
AST SERPL-CCNC: 17 U/L — SIGNIFICANT CHANGE UP (ref 10–40)
BILIRUB SERPL-MCNC: 0.3 MG/DL — SIGNIFICANT CHANGE UP (ref 0.2–1.2)
BUN SERPL-MCNC: 21 MG/DL — SIGNIFICANT CHANGE UP (ref 7–23)
CALCIUM SERPL-MCNC: 8.8 MG/DL — SIGNIFICANT CHANGE UP (ref 8.4–10.5)
CHLORIDE SERPL-SCNC: 100 MMOL/L — SIGNIFICANT CHANGE UP (ref 96–108)
CO2 SERPL-SCNC: 27 MMOL/L — SIGNIFICANT CHANGE UP (ref 22–31)
CREAT SERPL-MCNC: 1.69 MG/DL — HIGH (ref 0.5–1.3)
EGFR: 45 ML/MIN/1.73M2 — LOW
GLUCOSE SERPL-MCNC: 115 MG/DL — HIGH (ref 70–99)
HCT VFR BLD CALC: 45.7 % — SIGNIFICANT CHANGE UP (ref 39–50)
HGB BLD-MCNC: 14.8 G/DL — SIGNIFICANT CHANGE UP (ref 13–17)
MAGNESIUM SERPL-MCNC: 2 MG/DL — SIGNIFICANT CHANGE UP (ref 1.6–2.6)
MCHC RBC-ENTMCNC: 27.3 PG — SIGNIFICANT CHANGE UP (ref 27–34)
MCHC RBC-ENTMCNC: 32.4 GM/DL — SIGNIFICANT CHANGE UP (ref 32–36)
MCV RBC AUTO: 84.3 FL — SIGNIFICANT CHANGE UP (ref 80–100)
NRBC # BLD: 0 /100 WBCS — SIGNIFICANT CHANGE UP (ref 0–0)
PHOSPHATE SERPL-MCNC: 2.9 MG/DL — SIGNIFICANT CHANGE UP (ref 2.5–4.5)
PLATELET # BLD AUTO: 293 K/UL — SIGNIFICANT CHANGE UP (ref 150–400)
POTASSIUM SERPL-MCNC: 4.1 MMOL/L — SIGNIFICANT CHANGE UP (ref 3.5–5.3)
POTASSIUM SERPL-SCNC: 4.1 MMOL/L — SIGNIFICANT CHANGE UP (ref 3.5–5.3)
PROT SERPL-MCNC: 6.3 G/DL — SIGNIFICANT CHANGE UP (ref 6–8.3)
RBC # BLD: 5.42 M/UL — SIGNIFICANT CHANGE UP (ref 4.2–5.8)
RBC # FLD: 16.5 % — HIGH (ref 10.3–14.5)
SODIUM SERPL-SCNC: 135 MMOL/L — SIGNIFICANT CHANGE UP (ref 135–145)
WBC # BLD: 5.46 K/UL — SIGNIFICANT CHANGE UP (ref 3.8–10.5)
WBC # FLD AUTO: 5.46 K/UL — SIGNIFICANT CHANGE UP (ref 3.8–10.5)

## 2023-08-28 PROCEDURE — 99232 SBSQ HOSP IP/OBS MODERATE 35: CPT | Mod: GC

## 2023-08-28 PROCEDURE — 99222 1ST HOSP IP/OBS MODERATE 55: CPT

## 2023-08-28 PROCEDURE — 99223 1ST HOSP IP/OBS HIGH 75: CPT

## 2023-08-28 RX ORDER — POLYETHYLENE GLYCOL 3350 17 G/17G
17 POWDER, FOR SOLUTION ORAL
Refills: 0 | Status: DISCONTINUED | OUTPATIENT
Start: 2023-08-28 | End: 2023-09-02

## 2023-08-28 RX ORDER — ACETAMINOPHEN 500 MG
975 TABLET ORAL EVERY 8 HOURS
Refills: 0 | Status: DISCONTINUED | OUTPATIENT
Start: 2023-08-28 | End: 2023-09-02

## 2023-08-28 RX ORDER — OXYCODONE HYDROCHLORIDE 5 MG/1
2.5 TABLET ORAL EVERY 6 HOURS
Refills: 0 | Status: DISCONTINUED | OUTPATIENT
Start: 2023-08-28 | End: 2023-08-31

## 2023-08-28 RX ORDER — SODIUM CHLORIDE 9 MG/ML
1000 INJECTION, SOLUTION INTRAVENOUS ONCE
Refills: 0 | Status: COMPLETED | OUTPATIENT
Start: 2023-08-28 | End: 2023-08-28

## 2023-08-28 RX ORDER — ONDANSETRON 8 MG/1
4 TABLET, FILM COATED ORAL EVERY 8 HOURS
Refills: 0 | Status: DISCONTINUED | OUTPATIENT
Start: 2023-08-28 | End: 2023-09-02

## 2023-08-28 RX ORDER — SENNA PLUS 8.6 MG/1
2 TABLET ORAL AT BEDTIME
Refills: 0 | Status: DISCONTINUED | OUTPATIENT
Start: 2023-08-28 | End: 2023-09-02

## 2023-08-28 RX ADMIN — ENOXAPARIN SODIUM 40 MILLIGRAM(S): 100 INJECTION SUBCUTANEOUS at 18:17

## 2023-08-28 RX ADMIN — SODIUM CHLORIDE 333.33 MILLILITER(S): 9 INJECTION, SOLUTION INTRAVENOUS at 15:07

## 2023-08-28 RX ADMIN — POLYETHYLENE GLYCOL 3350 17 GRAM(S): 17 POWDER, FOR SOLUTION ORAL at 18:17

## 2023-08-28 RX ADMIN — Medication 650 MILLIGRAM(S): at 00:38

## 2023-08-28 RX ADMIN — Medication 650 MILLIGRAM(S): at 01:00

## 2023-08-28 NOTE — CONSULT NOTE ADULT - ASSESSMENT
62yo Male from assisted living facility h/o chronic back pain (? L4/L5 osteomyelitis), CKD w/ L hydronephrosis, alcohol use disorder, hypothyroidism p/w intoxication and admitted for management of hydronephrosis (s/p L PCN). palliative consulted for constipation and nausea.

## 2023-08-28 NOTE — CONSULT NOTE ADULT - SUBJECTIVE AND OBJECTIVE BOX
[facility]  Palliative Care Fellow  Contact Info: Call [phone] (HEAL Line)     HPI:  Mr. Healy is a 63 year old male with a past medical history of chronic back pain with concerns for L4/L5 osteomyelitis, alcohol abuse without withdrawal history who was brought in to [facility] after being found sleeping on the ground intoxicated, admitted for further management of possible osteomyelitis and hydronephrosis.     Patient reports that he drank about 6 glasses of leigh Camacho this morning, does not remember falling but awoke on the ground in front of his wheelchair with increased back pain. Patient denies history of LOC, denies hitting head or head pain, denies aura prior to event. Patient was brought to [facility] and planned to be discharged but due to increased back pain refused to leave. Patient reports that pain is bearable after treatment with Toradol 30 and Oxy 5. Pain described as sharp 7/10 in the L4/L5 region with radiation down to the bilateral hips. Denies sensation or strength changes.     Recently seen by nephrology for CHITO on CKD who recommended performance of cystoscopy to alleviate L hydronephrosis. Patient was scheduled for procedure 08/14 but missed the appointment as he was admitted at [facility] for chest pain. No intervention was performed at that time. Patient was planned for left ureteroscopy w/endopyelotomy.     Patient is followed by ID for concerns for osteomyelitis in the spine. Dr. Jiménez notes that she reviewed MRI lumbar spine with Dr. Johnson (Neuroradiology) on 7/31 with concerns for osteomyelitis/discitis L4-L5 without change from 4/2023. There is no progression and would have expected progression of findings over 3 months if infectious but alternatively could be very indolent organism. Patient was planned for a dual isotope bone/gallium scan with SPECT CT to try to differentiate, but patient reports that he was not aware of this plan and did not have this performed.     In the Ed vitals are afebrile, Hr of 78, BP of 137/89, saturating 98% on RA.   Labs show no leukocytosis/anemia, thrombocytosis, normal CRP, stable CKD 3, protein gap, alcohol negative, cocaine on UDS. Previously noted to have hypothyroidism. In the Ed the patient was treated with Vancomycin and Zosyn, 2.3 L NS IVF, Toradol 30 mg IVP and Oxycodone 5 mg for pain relief.   CT IMPRESSION: 1.   Unchanged severe left hydronephrosis with marked cortical thinning. 2.   No renal or ureteral calculi. 3.   Copious stool throughout the colon, suggesting constipation. 4.   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   (21 Aug 2023 01:30)    Palliative Consulted for assistance with Nausea and constipation.     Patient seen and examined at bedside. Pt stating that he has constipation. Last BM was yesterday after enema. He feels that he has more stool that needs to come out. Pt also has nausea.   HE states that pain has improved but he does have lower back pain episodes described as stabbing pain. Denies any other significant complaints.  Pt upset about nephrostomy tube complication. he shares wanting to return to his assisted living facility in [facility]. Pt's mother is his emergency contact/hcp. Pt just spoke with yesterday which was  first time since admission.  Pt shares that he has no children, has 3 sisters (2 live in [place] and 1 lives in the [place]).  Pt states that he walks with a rolling walker.    Comprehensive symptom assessment and GOC exploration as noted below. Further collateral obtained from primary team, chart, and family.    PERTINENT PM/SXH:   Arthritis    Thyroid disease    DJD (degenerative joint disease)    Chronic back pain    2019 novel coronavirus disease (COVID-19)      No significant past surgical history    No pertinent past surgical history    History of kidney surgery      FAMILY HISTORY:        ITEMS NOT CHECKED ARE NOT PRESENT  SOCIAL HISTORY:   Significant other/partner:  []  Children:  []  Substance hx:  []   Tobacco hx:  []   Alcohol hx: []   Home Opioid hx:  [x] I-Stop Reference No: This report was requested by: Woody Guerrero | Reference #: [id number]  - no active Rx's / see chart note  Living Situation: []Home  []Long term care  []Rehab [x]Other - assisted living facility   Samaritan/Spiritual practice: ; Role of organized Yarsani [] important [x] some [] unable to assess  Coping: [] well [x] with difficulty [] poor coping [] unable to assess  Support system: [] strong [] adequate [] inadequate    ADVANCE DIRECTIVES:    []MOLST  []Living Will  DECISION MAKER(s):  [] Health Care Proxy(s)  [] Surrogate(s)  [] Guardian           Name(s)/Phone Number(s):     BASELINE (I)ADLs (prior to admission):  Damon: []Total  [x] Moderate []Dependent    ALLERGIES:  No Known Allergies    MEDICATIONS  (STANDING):  acetaminophen     Tablet .. 975 milliGRAM(s) Oral every 8 hours  dextrose 5% + sodium chloride 0.9%. 1000 milliLiter(s) (100 mL/Hr) IV Continuous <Continuous>  enoxaparin Injectable 40 milliGRAM(s) SubCutaneous every 24 hours  levothyroxine 150 MICROGram(s) Oral daily  lidocaine   4% Patch 1 Patch Transdermal every 24 hours  melatonin 3 milliGRAM(s) Oral at bedtime  polyethylene glycol 3350 17 Gram(s) Oral two times a day  senna 2 Tablet(s) Oral at bedtime    MEDICATIONS  (PRN):  ondansetron    Tablet 4 milliGRAM(s) Oral every 8 hours PRN Nausea and/or Vomiting  oxyCODONE    IR 2.5 milliGRAM(s) Oral every 6 hours PRN moderate and severe pain    Analgesic Use (Scheduled and PRNs) for past 24 hours:    acetaminophen     Tablet ..   650 milliGRAM(s) Oral (08-28-23 @ 00:38)    melatonin   3 milliGRAM(s) Oral (08-27-23 @ 22:52)      PRESENT SYMPTOMS: []Unable to obtain due to poor mentation/encephalopathy  Source if other than patient:  []Family   []Team     Pain: [x] yes [] no  QOL impact - makes ambulation difficult with walker  Location -     lower back                Aggravating Factors - random triggers  Quality -sharp  Radiation -   Timing - episodic  Severity (0-10 scale) -  8/10  Minimal Acceptable Level (0-10 scale) - 4/10      Dyspnea:                           []Mild  []Moderate []Severe  Anxiety:                             []Mild []Moderate []Severe  Fatigue:                             []Mild []Moderate []Severe  Nausea:                             []Mild [x]Moderate []Severe  Loss of Appetite:              []Mild []Moderate []Severe  Constipation:                    []Mild [x]Moderate []Severe    Other Symptoms:  [x]All other review of systems negative     Palliative Performance Status Version 2:  50 %  (Functional Assessment Tool)    Vital Signs Last 24 Hrs  T(C): 36.6 (28 Aug 2023 09:49), Max: 37.1 (27 Aug 2023 18:18)  T(F): 97.8 (28 Aug 2023 09:49), Max: 98.8 (27 Aug 2023 21:42)  HR: 64 (28 Aug 2023 09:49) (64 - 92)  BP: 134/91 (28 Aug 2023 09:49) (115/77 - 134/91)  BP(mean): --  RR: 16 (28 Aug 2023 09:49) (16 - 19)  SpO2: 98% (28 Aug 2023 09:49) (95% - 99%)    Parameters below as of 28 Aug 2023 09:49  Patient On (Oxygen Delivery Method): room air         GENERAL:  [] NAD [x]Alert []Lethargic  []Cachexia  []Unarousable  [x]Verbal  []Non-Verbal  BEHAVIORAL:   []Anxiety  []Delirium []Agitation []Cooperative [x]Oriented x 3  HEENT:  [x]Normal  [] Moist Mucous Membranes []Dry mouth   []ET Tube/Trach  []Oral lesions  PULMONARY:   [x]Clear []Tachypnea  []Audible excessive secretions  []Normal Work of Breathing []Labored Breathing  []Rhonchi []Crackles []Wheezing  CARDIOVASCULAR:    [x]Regular Rate []Regular Rhythm []Irregular []Tachy  [][place]urmeet  GASTROINTESTINAL:  [x]Soft  []Distended   []+BS  [x]Non tender []Tender  []PEG []OGT/ NGT  Last BM: 8/27/23  GENITOURINARY:  []Normal [] Incontinent   []Oliguria/Anuria   [x]Left Nephrostomy tube   MUSCULOSKELETAL:   []Normal Extremities  [x]Weakness  []Bed/Wheelchair bound []Edema  NEUROLOGIC:   []No focal deficits  []Cognitive impairment  []Dysphagia []Dysarthria []Paresis []Encephalopathic  SKIN:   []Normal   []Pressure ulcer(s)  []Rash     CRITICAL CARE:  [ ]Shock Present  [ ]Septic [ ]Cardiogenic [ ]Neurologic [ ]Hypovolemic  [ ] Vasopressors [ ]Inotropes   [ ]Respiratory failure present [ ]Mechanical Ventilation [ ]Non-invasive ventilatory support [ ]High-Flow  [ ]Acute  [ ]Chronic [ ]Hypoxic  [ ]Hypercarbic   [ ]Other organ failure      LABS:                        14.8   5.46  )-----------( 293      ( 28 Aug 2023 10:27 )             45.7   08-28    135  |  100  |  21  ----------------------------<  115<H>  4.1   |  27  |  1.69<H>    Ca    8.8      28 Aug 2023 10:27  Phos  2.9     08-28  Mg     2.0     08-28    TPro  6.3  /  Alb  3.3  /  TBili  0.3  /  DBili  x   /  AST  17  /  ALT  10  /  AlkPhos  68  08-28    RADIOLOGY & ADDITIONAL STUDIES:    ACC: [id number] EXAM:  CT ABDOMEN AND PELVIS   ORDERED BY: AVA GILL     PROCEDURE DATE:  08/27/2023          INTERPRETATION:  CLINICAL INFORMATION: Nausea and vomiting    COMPARISON: 8/20    CONTRAST/COMPLICATIONS:  IV Contrast: None    PROCEDURE:  CT of the Abdomen and Pelvis was performed.  Sagittal and coronal reformats were performed.    FINDINGS:  LOWER CHEST: Within normal limits.    LIVER: Within normal limits.  BILE DUCTS: Normal caliber.  GALLBLADDER: Within normal limits.  SPLEEN: Within normal limits.  PANCREAS: Within normal limits.  ADRENALS: Within normal limits.  KIDNEYS/URETERS: Nephrostomy in the left extrarenal pelvis, resolution of   previous severe hydronephrosis. Small air in the collecting system.    BLADDER: Within normal limits.  REPRODUCTIVE ORGANS: Prostate within normal limits.    BOWEL: No bowel obstruction. Appendix is normal.  PERITONEUM: No ascites.  VESSELS: Within normal limits.  RETROPERITONEUM/LYMPH NODES: Improving left retroperitoneal hematoma.  ABDOMINAL WALL: Within normal limits.  BONES: Redemonstrated are extensive erosive changes at L5-S1 level with   spondylolisthesis. Embolization coils anterior to left L4 vertebral body.    IMPRESSION:  No acute findings provided the limitation of a noncontrast technique.   Incidental comments as above.    --- End of Report ---    AICHA QUINTERO MD; Attending Radiologist  This document has been electronically signed. Aug 28 2023  6:53AM      REFERRALS:  [ ]Social Work  []Case management []PT/OT []Chaplaincy  []Hospice  []Patient/Family Support []Massage Therapy []Music Therapy []Holistic RN

## 2023-08-28 NOTE — CONSULT NOTE ADULT - SUBJECTIVE AND OBJECTIVE BOX
HPI:  63M h/o chronic back pain (? L4/L5 osteomyelitis), CKD w/ L hydronephrosis, alcohol use disorder, hypothyroidism p/w intoxication and admitted for management of hydronephrosis (s/p L PCN). GI is consulted for nausea.    Pt was brought to [facility] after found sleeping on the ground intoxicated after reportedly drinking 6 glasses Enio Camacho and was planned to be discharged but refused to leave 2/2 increased back pain thus brought to [facility] and admitted to medicine. His back findings were felt to be chronic and stable thus abx were deferred and is planned to undergo outpatient dual isotope bone scan. He underwent attempted ureteroscopy followed by IR PCN for his L hydronephrosis, and he was seen by endocrine for his hypothyroidism for which they recommended dosing levothyroxine prior to breakfast. He developed NBNB N/V while inpatient which improved somewhat with bowel reg, though he was only able to eat a little bit from his breakfast today.    Allergies  No Known Allergies    Intolerances    Home Medications:  folic acid 1 mg oral tablet: 1 tab(s) orally once a day (21 Aug 2023 02:16)  gabapentin 100 mg oral capsule: 2 orally 3 times a day (21 Aug 2023 02:16)  levothyroxine 150 mcg (0.15 mg) oral capsule: 1 orally once a day (21 Aug 2023 02:16)  Multiple Vitamins oral tablet: 1 tab(s) orally once a day (21 Aug 2023 02:16)  thiamine 100 mg oral tablet: 1 tab(s) orally once a day (21 Aug 2023 02:16)  traMADol 50 mg oral tablet: 0.5 orally 3 times a day (21 Aug 2023 02:16)    MEDICATIONS:  MEDICATIONS  (STANDING):  dextrose 5% + sodium chloride 0.9%. 1000 milliLiter(s) (100 mL/Hr) IV Continuous <Continuous>  enoxaparin Injectable 40 milliGRAM(s) SubCutaneous every 24 hours  levothyroxine 150 MICROGram(s) Oral daily  lidocaine   4% Patch 1 Patch Transdermal every 24 hours  melatonin 3 milliGRAM(s) Oral at bedtime  ondansetron    Tablet 4 milliGRAM(s) Oral once  polyethylene glycol 3350 17 Gram(s) Oral two times a day  senna 2 Tablet(s) Oral at bedtime    MEDICATIONS  (PRN):  oxyCODONE    IR 2.5 milliGRAM(s) Oral every 6 hours PRN Moderate Pain (4 - 6)    PAST MEDICAL & SURGICAL HISTORY:  Arthritis  Thyroid disease  DJD (degenerative joint disease)  Chronic back pain  2019 novel coronavirus disease (COVID-19) w/intubation  History of kidney surgery    SOCIAL HISTORY:  Active EtOH use, lives at assisted living.    Vital Signs Last 24 Hrs  T(C): 36.6 (28 Aug 2023 09:49), Max: 37.1 (27 Aug 2023 18:18)  T(F): 97.8 (28 Aug 2023 09:49), Max: 98.8 (27 Aug 2023 21:42)  HR: 64 (28 Aug 2023 09:49) (64 - 92)  BP: 134/91 (28 Aug 2023 09:49) (115/77 - 134/91)  BP(mean): --  RR: 16 (28 Aug 2023 09:49) (16 - 19)  SpO2: 98% (28 Aug 2023 09:49) (95% - 99%)    Parameters below as of 28 Aug 2023 09:49  Patient On (Oxygen Delivery Method): room air    PHYSICAL EXAM:  General: Alert, no acute distress  Lungs: Normal respiratory effort  Abdomen: Soft, non-tender non-distended  Extremities: Warm, no edema  Neurological: Moving all extremities spontaneously    LABS:             14.8   5.46  )-----------( 293      ( 28 Aug 2023 10:27 )             45.7     08-28    135  |  100  |  21  ----------------------------<  115<H>  4.1   |  27  |  1.69<H>    Ca    8.8      28 Aug 2023 10:27  Phos  2.9     08-28  Mg     2.0     08-28    TPro  6.3  /  Alb  3.3  /  TBili  0.3  /  DBili  x   /  AST  17  /  ALT  10  /  AlkPhos  68  08-28    RADIOLOGY & ADDITIONAL STUDIES:     CT A/P 8/27: No acute findings provided the limitation of a noncontrast technique.

## 2023-08-28 NOTE — PROGRESS NOTE ADULT - SUBJECTIVE AND OBJECTIVE BOX
**INCOMPLETE NOTE    OVERNIGHT EVENTS: None    SUBJECTIVE:  Patient seen and examined at bedside, comfortable, NAD. Denied fever, chest pain, dyspnea, abdominal pain.     Vital Signs Last 12 Hrs  T(F): 98.8 (08-27-23 @ 21:42), Max: 98.8 (08-27-23 @ 21:42)  HR: 91 (08-27-23 @ 21:42) (91 - 91)  BP: 115/77 (08-27-23 @ 21:42) (115/77 - 115/77)  BP(mean): --  RR: 18 (08-27-23 @ 21:42) (18 - 18)  SpO2: 99% (08-27-23 @ 21:42) (99% - 99%)  I&O's Summary      PHYSICAL EXAM:  Constitutional: NAD, comfortable in bed.  HEENT: NC/AT, PERRLA, EOMI, no conjunctival pallor or scleral icterus, MMM  Neck: Supple, no JVD  Respiratory: CTA B/L. No w/r/r.   Cardiovascular: RRR, normal S1 and S2, no m/r/g.   Gastrointestinal: +BS, soft NTND, no guarding or rebound tenderness, no palpable masses   Extremities: wwp; no cyanosis, clubbing or edema.   Vascular: Pulses equal and strong throughout.   Neurological: AAOx3, no CN deficits, strength and sensation intact throughout.   Skin: No gross skin abnormalities or rashes        LABS:                        15.6   5.79  )-----------( 309      ( 27 Aug 2023 06:36 )             47.5     08-27    135  |  100  |  22  ----------------------------<  97  4.2   |  25  |  1.44<H>    Ca    8.9      27 Aug 2023 06:36  Phos  3.2     08-27  Mg     2.0     08-27    TPro  6.6  /  Alb  3.4  /  TBili  0.3  /  DBili  x   /  AST  16  /  ALT  6<L>  /  AlkPhos  68  08-27      Urinalysis Basic - ( 27 Aug 2023 06:36 )    Color: x / Appearance: x / SG: x / pH: x  Gluc: 97 mg/dL / Ketone: x  / Bili: x / Urobili: x   Blood: x / Protein: x / Nitrite: x   Leuk Esterase: x / RBC: x / WBC x   Sq Epi: x / Non Sq Epi: x / Bacteria: x          RADIOLOGY & ADDITIONAL TESTS:    MEDICATIONS  (STANDING):  acetaminophen     Tablet .. 650 milliGRAM(s) Oral every 6 hours  dextrose 5% + sodium chloride 0.9%. 1000 milliLiter(s) (100 mL/Hr) IV Continuous <Continuous>  enoxaparin Injectable 40 milliGRAM(s) SubCutaneous every 24 hours  levothyroxine 150 MICROGram(s) Oral daily  lidocaine   4% Patch 1 Patch Transdermal every 24 hours  melatonin 3 milliGRAM(s) Oral at bedtime  ondansetron    Tablet 4 milliGRAM(s) Oral once    MEDICATIONS  (PRN):  oxyCODONE    IR 2.5 milliGRAM(s) Oral every 6 hours PRN Moderate Pain (4 - 6)  polyethylene glycol 3350 17 Gram(s) Oral every 24 hours PRN Constipation   OVERNIGHT EVENTS: Pt provided with numerous treatments aimed at alleviating consitpation yesterday (enema, lactulose, miralax, senna, dulcolax). He had multiple bowel movements and reported improved symptoms, bowel regimen dc'ed overnight. CT A/P results significant for fat stranding & mild L kidney inflammation.     SUBJECTIVE:  Patient seen and examined at bedside. Standing anti-inflammatory started yesterday refused by pt this am - states too tired to take medications/have vitals taken, will reconsider later. Does, however, report improved symptoms of nausea & vomiting.    Vital Signs Last 12 Hrs  T(F): 98.8 (08-27-23 @ 21:42), Max: 98.8 (08-27-23 @ 21:42)  HR: 91 (08-27-23 @ 21:42) (91 - 91)  BP: 115/77 (08-27-23 @ 21:42) (115/77 - 115/77)  BP(mean): --  RR: 18 (08-27-23 @ 21:42) (18 - 18)  SpO2: 99% (08-27-23 @ 21:42) (99% - 99%)  I&O's Summary      PHYSICAL EXAM:  Constitutional: pt curled up in bed, under covers  HEENT: normocephalic, atraumatic; mucous membranes pink, without erythema or exudates, poor oral hygiene and dentition - missing numerous upper & lower teeth.   Neck: supple, without adenopathy; carotid pulses 2+ bilaterally; no JVD.   Cardiac: regular heart rate and rhythm, without murmurs, rubs, or gallops.   Respiratory: chest wall symmetric and atraumatic; no signs of respiratory distress; lung sounds clear in all lobes, without wheezes, rales, or rhonchi.   Abdominal: soft, non-tender, non-distended, + BS  Back: Left percutaneous nephrolithotomy in place, covered by clean, dry bandage, light-red/orange fluid collection, fruit-punch-colored  Psych: normal affect  MSK: no joint swelling          LABS:                        15.6   5.79  )-----------( 309      ( 27 Aug 2023 06:36 )             47.5     08-27    135  |  100  |  22  ----------------------------<  97  4.2   |  25  |  1.44<H>    Ca    8.9      27 Aug 2023 06:36  Phos  3.2     08-27  Mg     2.0     08-27    TPro  6.6  /  Alb  3.4  /  TBili  0.3  /  DBili  x   /  AST  16  /  ALT  6<L>  /  AlkPhos  68  08-27      Urinalysis Basic - ( 27 Aug 2023 06:36 )    Color: x / Appearance: x / SG: x / pH: x  Gluc: 97 mg/dL / Ketone: x  / Bili: x / Urobili: x   Blood: x / Protein: x / Nitrite: x   Leuk Esterase: x / RBC: x / WBC x   Sq Epi: x / Non Sq Epi: x / Bacteria: x          RADIOLOGY & ADDITIONAL TESTS:    MEDICATIONS  (STANDING):  acetaminophen     Tablet .. 650 milliGRAM(s) Oral every 6 hours  dextrose 5% + sodium chloride 0.9%. 1000 milliLiter(s) (100 mL/Hr) IV Continuous <Continuous>  enoxaparin Injectable 40 milliGRAM(s) SubCutaneous every 24 hours  levothyroxine 150 MICROGram(s) Oral daily  lidocaine   4% Patch 1 Patch Transdermal every 24 hours  melatonin 3 milliGRAM(s) Oral at bedtime  ondansetron    Tablet 4 milliGRAM(s) Oral once    MEDICATIONS  (PRN):  oxyCODONE    IR 2.5 milliGRAM(s) Oral every 6 hours PRN Moderate Pain (4 - 6)  polyethylene glycol 3350 17 Gram(s) Oral every 24 hours PRN Constipation

## 2023-08-28 NOTE — CONSULT NOTE ADULT - ATTENDING COMMENTS
Agree w/ above. Pt found down intoxicated and brought to [facility] and eventually [facility] for procedural intervention for hydronephrosis. Was having nausea w/ recurrent vomiting and significant constipation. Constipation improved significantly w/ bowel regimen but still needs to evacuate further. Nausea persists but improves, but no vomiting since yesterday. Tolerated banana and turkey hathaway this AM w/o issue.  Labs and imaging reviewed. No obstruction. Significant stool burden.   Advance diet as tolerated. Zofran PRN. MiraLAX bowel regimen, titrated to 1-2 solid BMs per day.   No GI contraindication to d/c at this time if tolerating PO.     Of note, pt frustrated that d/c to assisted living facility is not being offered and he feels is being forced to [facility] or to AMA.     GI will sign off. Please call us back as needed.
Pt seen on rounds this afternoon.  63-yo man with a history of hypothyroidism, chronic EtOH abuse, previous OM and diskitis of the lumbar spine, and chronic L hydronephrosis.  He has now been admitted after being found on the ground and brought in to ED by EMS.  Says that he had been drinking prior to the episode though alcohol level was apparently undetectable  Consult requested because of TFTs with undetectable free T4 level and TSH 26.1 uU/ml. (Total T4 was low at 2.5 mcg/dl).  His levothyroxine dose had been 112 mcg/day during his admission in April, but has since been increased to 150 mcg/day.  Pt says that his meds at his assisted living residence are given to him by the facility at a medication window.  He indicates that he knows that his levothyroxine needs to be taken before breakfast, but that he is given the pills (along with all of his other meds) after breakfast.    His current TFTs clearly indicate essentially no absorption of the levothyroxine.  Would suspect that if the problem was only interference by other meds taken at the same time that his TFTs would show inadequate replacement, but not a near-undetectable free T4 level--fabiana on a dose of 150 mcg/day.  --For now, will continue the levothyroxine at 150 mcg/day.  This is probably above his true requirement, but will continue this until discharge.  --When he is to be discharged back to the facility, would decrease the dose to 125 mcg/day, and transfer summary should indicate (IN CAPITAL LETTERS) that the drug needs to be given, by itself, at least 30 min before breakfast

## 2023-08-28 NOTE — PROGRESS NOTE ADULT - PROBLEM SELECTOR PLAN 1
- upright KUB with substantial constipation - enema, started bowel regimen, discontinued & changed to standing miralax following numerous BMs  - GI consulted, appreciate recs  - CT abd/pelvis significant for L kidney inflammation & fat stranding - started standing anti-inflammatory (tylenol)

## 2023-08-28 NOTE — CONSULT NOTE ADULT - PROBLEM SELECTOR RECOMMENDATION 4
s/p attempted unsuccessful L URS on 8/21. Distal-mid ureter noted to be entirely obstructed vs obliterated with inability to pass wire or contrast retrograde beyond area of obstruction. Now s/p IR L PCN 8/22.    -today L PCN with fruit punch colored output, not recorded.   -IR will continuing to follow. Asking for staff to monitor drain outputs.

## 2023-08-28 NOTE — CONSULT NOTE ADULT - PROBLEM SELECTOR RECOMMENDATION 7
-pt is full code. He was on a mechanical ventilator during first wave of covid in 2020. Pt notes to wanting CPR and mechanical ventilator if needs to preserve life. He states that his mother is HCP.

## 2023-08-28 NOTE — PROGRESS NOTE ADULT - ASSESSMENT
Mr. Healy is a 63 year old male with a past medical history of chronic back pain with concerns for L4/L5 osteomyelitis, alcohol use without withdrawal history who was brought in to [facility] after being found sleeping on the ground intoxicated, admitted for further management of possible osteomyelitis and hydronephrosis, s/p L PCN placement.

## 2023-08-28 NOTE — PROGRESS NOTE ADULT - SUBJECTIVE AND OBJECTIVE BOX
63M with a past medical history of chronic back pain with concerns for L4/L5 osteomyelitis, alcohol abuse without withdrawal history who was brought in to [facility] after being found sleeping on the ground intoxicated, admitted for further management of possible osteomyelitis and hydronephrosis. S/p attempted unsuccessful L URS on 8/21/23. Distal-mid ureter noted to be entirely obstructed vs obliterated with inability to pass wire or contrast retrograde beyond area of obstruction. Urology following, recommending nephrostomy placement. IR consulted for left PCN placed 8/22/23.    L PCN with fruit punch colored output, not recorded. Dressing c/d/i.     IR will continue to follow. Please record drain outputs.

## 2023-08-28 NOTE — CONSULT NOTE ADULT - PROBLEM SELECTOR RECOMMENDATION 2
-pt reports nausea has improved and no vomiting reported today.   -started zofran 4mg q8hr PRN for Nausea/vomiting  -EKG -  on 8/23

## 2023-08-28 NOTE — CONSULT NOTE ADULT - PROBLEM SELECTOR RECOMMENDATION 9
-LAST bm was yesterday after getting enema. Pt feels he needs to evacuate more stool.   -increased BM regimen to senna HS and miralax BID  -will continue to assess.

## 2023-08-28 NOTE — CONSULT NOTE ADULT - PROBLEM SELECTOR RECOMMENDATION 6
PPSV 50%  -requires assistance with some of ADLs.  -pt from assisted living facility. He uses a rolling walker to ambulate. Has Alcohol hx and hx of falls.

## 2023-08-28 NOTE — CONSULT NOTE ADULT - PROBLEM SELECTOR RECOMMENDATION 3
-pt reports pain improvement, but has some back pain.   -in last 24hrs used 1 prn of oxycodone 2.5mg q6hr PRN for moderate-severe pain   -c/w oxycodone PRN   - increased tylenol to 975mg q8hr ATC  -lidocaine patch

## 2023-08-28 NOTE — PROGRESS NOTE ADULT - PROBLEM SELECTOR PLAN 4
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 Patient underwent biopsy that did not yield bacterial data and refused discharge to [facility] for IV antibiotics while this was worked up. Patient with consistently low ESR/CRP/leukocytosis and no fevers.   Repeat MR lumbar also showing OM vs discitis, though no progression of disease. History of back injections for pain management. Planned for dual isotope bone/gallium scan with SPECT CT with ID for differentiation.   Bx Culture: Gram positive cocci in clusters, methicillin-resistant staph epi    Plan:  - Likely follow up outpatient for dual isotope bone/gallium scan with SPECT CT and work up for back pain.   - Would hold antibiotics at this time given prolonged duration off of antibiotic therapy without progression  - Continue home gabapentin dose  - pain control

## 2023-08-28 NOTE — CONSULT NOTE ADULT - ASSESSMENT
63M h/o chronic back pain (? L4/L5 osteomyelitis), CKD w/ L hydronephrosis, alcohol use disorder, hypothyroidism p/w intoxication and admitted for management of hydronephrosis (s/p L PCN). GI is consulted for nausea.    Nausea may be multifactorial and possibly related to acute medical illnesses, medications, constipation, etc. and given his alcohol use it would also not unreasonable for an empiric short-term trial of PPIs. It appears that his nausea is beginning to improve thus would otherwise continue treatment as you are doing.    Recommendations:  - Encourage pt to eat small, frequent portions instead of large meals  - Can trial empiric pantoprazole (or alternate PPI) 40mg daily for up to 4-8 weeks  - Treatment of constipation and antiemetics per primary team    We will continue to follow. Please see attending addendum for final recommendations.     Claudy (Rose) MD Jayy  Gastroenterology Fellow  Pager (M-F 7a-5p): [phone]  Pager (after hours): Please call  for on-call fellow   63M h/o chronic back pain (? L4/L5 osteomyelitis), CKD w/ L hydronephrosis, alcohol use disorder, hypothyroidism p/w intoxication and admitted for management of hydronephrosis (s/p L PCN). GI is consulted for nausea.    Nausea may be multifactorial and possibly related to acute medical illnesses, medications, constipation, etc. and given his alcohol use it would also not unreasonable for an empiric short-term trial of PPIs. It appears that his nausea is beginning to improve thus would otherwise continue treatment as you are doing.    Recommendations:  - Encourage pt to eat small, frequent portions instead of large meals  - Can trial empiric pantoprazole (or alternate PPI) 40mg daily for up to 4-8 weeks  - Treatment of constipation and antiemetics per primary team    We will sign off, but please page if any further questions arise. Please see attending addendum for final recommendations.    Claudy (Rose) MD Jayy  Gastroenterology Fellow  Pager (M-F 7a-5p): [phone]  Pager (after hours): Please call  for on-call fellow

## 2023-08-28 NOTE — CONSULT NOTE ADULT - REASON FOR ADMISSION
R/o OM, possible inpatient uretoscopy

## 2023-08-29 ENCOUNTER — APPOINTMENT (OUTPATIENT)
Dept: INFECTIOUS DISEASE | Facility: CLINIC | Age: 63
End: 2023-08-29

## 2023-08-29 LAB
ANION GAP SERPL CALC-SCNC: 8 MMOL/L — SIGNIFICANT CHANGE UP (ref 5–17)
BASOPHILS # BLD AUTO: 0.06 K/UL — SIGNIFICANT CHANGE UP (ref 0–0.2)
BASOPHILS NFR BLD AUTO: 1.2 % — SIGNIFICANT CHANGE UP (ref 0–2)
BUN SERPL-MCNC: 17 MG/DL — SIGNIFICANT CHANGE UP (ref 7–23)
CALCIUM SERPL-MCNC: 8.6 MG/DL — SIGNIFICANT CHANGE UP (ref 8.4–10.5)
CHLORIDE SERPL-SCNC: 100 MMOL/L — SIGNIFICANT CHANGE UP (ref 96–108)
CO2 SERPL-SCNC: 26 MMOL/L — SIGNIFICANT CHANGE UP (ref 22–31)
CREAT SERPL-MCNC: 1.49 MG/DL — HIGH (ref 0.5–1.3)
EGFR: 53 ML/MIN/1.73M2 — LOW
EOSINOPHIL # BLD AUTO: 0.16 K/UL — SIGNIFICANT CHANGE UP (ref 0–0.5)
EOSINOPHIL NFR BLD AUTO: 3.2 % — SIGNIFICANT CHANGE UP (ref 0–6)
GLUCOSE SERPL-MCNC: 104 MG/DL — HIGH (ref 70–99)
HCT VFR BLD CALC: 44.7 % — SIGNIFICANT CHANGE UP (ref 39–50)
HGB BLD-MCNC: 14.4 G/DL — SIGNIFICANT CHANGE UP (ref 13–17)
IMM GRANULOCYTES NFR BLD AUTO: 0.6 % — SIGNIFICANT CHANGE UP (ref 0–0.9)
LYMPHOCYTES # BLD AUTO: 2.19 K/UL — SIGNIFICANT CHANGE UP (ref 1–3.3)
LYMPHOCYTES # BLD AUTO: 43.9 % — SIGNIFICANT CHANGE UP (ref 13–44)
MAGNESIUM SERPL-MCNC: 1.9 MG/DL — SIGNIFICANT CHANGE UP (ref 1.6–2.6)
MCHC RBC-ENTMCNC: 27 PG — SIGNIFICANT CHANGE UP (ref 27–34)
MCHC RBC-ENTMCNC: 32.2 GM/DL — SIGNIFICANT CHANGE UP (ref 32–36)
MCV RBC AUTO: 83.9 FL — SIGNIFICANT CHANGE UP (ref 80–100)
MONOCYTES # BLD AUTO: 0.49 K/UL — SIGNIFICANT CHANGE UP (ref 0–0.9)
MONOCYTES NFR BLD AUTO: 9.8 % — SIGNIFICANT CHANGE UP (ref 2–14)
NEUTROPHILS # BLD AUTO: 2.06 K/UL — SIGNIFICANT CHANGE UP (ref 1.8–7.4)
NEUTROPHILS NFR BLD AUTO: 41.3 % — LOW (ref 43–77)
NRBC # BLD: 0 /100 WBCS — SIGNIFICANT CHANGE UP (ref 0–0)
PHOSPHATE SERPL-MCNC: 2.7 MG/DL — SIGNIFICANT CHANGE UP (ref 2.5–4.5)
PLATELET # BLD AUTO: 338 K/UL — SIGNIFICANT CHANGE UP (ref 150–400)
POTASSIUM SERPL-MCNC: 4 MMOL/L — SIGNIFICANT CHANGE UP (ref 3.5–5.3)
POTASSIUM SERPL-SCNC: 4 MMOL/L — SIGNIFICANT CHANGE UP (ref 3.5–5.3)
RBC # BLD: 5.33 M/UL — SIGNIFICANT CHANGE UP (ref 4.2–5.8)
RBC # FLD: 16.2 % — HIGH (ref 10.3–14.5)
SODIUM SERPL-SCNC: 134 MMOL/L — LOW (ref 135–145)
T4 FREE SERPL-MCNC: 0.52 NG/DL — LOW (ref 0.93–1.7)
TSH SERPL-MCNC: 37.83 UIU/ML — HIGH (ref 0.27–4.2)
WBC # BLD: 4.99 K/UL — SIGNIFICANT CHANGE UP (ref 3.8–10.5)
WBC # FLD AUTO: 4.99 K/UL — SIGNIFICANT CHANGE UP (ref 3.8–10.5)

## 2023-08-29 PROCEDURE — 99497 ADVNCD CARE PLAN 30 MIN: CPT | Mod: 25

## 2023-08-29 PROCEDURE — 99233 SBSQ HOSP IP/OBS HIGH 50: CPT

## 2023-08-29 PROCEDURE — 99232 SBSQ HOSP IP/OBS MODERATE 35: CPT | Mod: GC

## 2023-08-29 PROCEDURE — 99232 SBSQ HOSP IP/OBS MODERATE 35: CPT

## 2023-08-29 RX ADMIN — Medication 150 MICROGRAM(S): at 06:59

## 2023-08-29 NOTE — PROGRESS NOTE ADULT - SUBJECTIVE AND OBJECTIVE BOX
**INCOMPLETE NOTE    OVERNIGHT EVENTS: None    SUBJECTIVE:  Patient seen and examined at bedside, comfortable, NAD. Denied fever, chest pain, dyspnea, abdominal pain.     Vital Signs Last 12 Hrs  T(F): 98.4 (08-29-23 @ 05:57), Max: 98.4 (08-29-23 @ 05:57)  HR: 71 (08-29-23 @ 05:57) (71 - 71)  BP: 118/83 (08-29-23 @ 05:57) (118/83 - 118/83)  BP(mean): --  RR: 18 (08-29-23 @ 05:57) (18 - 18)  SpO2: 97% (08-29-23 @ 05:57) (97% - 97%)  I&O's Summary    28 Aug 2023 07:01  -  29 Aug 2023 06:27  --------------------------------------------------------  IN: 0 mL / OUT: 100 mL / NET: -100 mL        PHYSICAL EXAM:  Constitutional: NAD, comfortable in bed.  HEENT: NC/AT, PERRLA, EOMI, no conjunctival pallor or scleral icterus, MMM  Neck: Supple, no JVD  Respiratory: CTA B/L. No w/r/r.   Cardiovascular: RRR, normal S1 and S2, no m/r/g.   Gastrointestinal: +BS, soft NTND, no guarding or rebound tenderness, no palpable masses   Extremities: wwp; no cyanosis, clubbing or edema.   Vascular: Pulses equal and strong throughout.   Neurological: AAOx3, no CN deficits, strength and sensation intact throughout.   Skin: No gross skin abnormalities or rashes        LABS:                        14.8   5.46  )-----------( 293      ( 28 Aug 2023 10:27 )             45.7     08-28    135  |  100  |  21  ----------------------------<  115<H>  4.1   |  27  |  1.69<H>    Ca    8.8      28 Aug 2023 10:27  Phos  2.9     08-28  Mg     2.0     08-28    TPro  6.3  /  Alb  3.3  /  TBili  0.3  /  DBili  x   /  AST  17  /  ALT  10  /  AlkPhos  68  08-28      Urinalysis Basic - ( 28 Aug 2023 10:27 )    Color: x / Appearance: x / SG: x / pH: x  Gluc: 115 mg/dL / Ketone: x  / Bili: x / Urobili: x   Blood: x / Protein: x / Nitrite: x   Leuk Esterase: x / RBC: x / WBC x   Sq Epi: x / Non Sq Epi: x / Bacteria: x          RADIOLOGY & ADDITIONAL TESTS:    MEDICATIONS  (STANDING):  acetaminophen     Tablet .. 975 milliGRAM(s) Oral every 8 hours  dextrose 5% + sodium chloride 0.9%. 1000 milliLiter(s) (100 mL/Hr) IV Continuous <Continuous>  enoxaparin Injectable 40 milliGRAM(s) SubCutaneous every 24 hours  levothyroxine 150 MICROGram(s) Oral daily  lidocaine   4% Patch 1 Patch Transdermal every 24 hours  melatonin 3 milliGRAM(s) Oral at bedtime  polyethylene glycol 3350 17 Gram(s) Oral two times a day  senna 2 Tablet(s) Oral at bedtime    MEDICATIONS  (PRN):  ondansetron    Tablet 4 milliGRAM(s) Oral every 8 hours PRN Nausea and/or Vomiting  oxyCODONE    IR 2.5 milliGRAM(s) Oral every 6 hours PRN moderate and severe pain   OVERNIGHT EVENTS: None    SUBJECTIVE:  Patient seen and examined at bedside, comfortable, NAD. Denied fever, chest pain, dyspnea, abdominal pain. Reports improved nausea, no vomiting.    Vital Signs Last 12 Hrs  T(F): 98.4 (08-29-23 @ 05:57), Max: 98.4 (08-29-23 @ 05:57)  HR: 71 (08-29-23 @ 05:57) (71 - 71)  BP: 118/83 (08-29-23 @ 05:57) (118/83 - 118/83)  BP(mean): --  RR: 18 (08-29-23 @ 05:57) (18 - 18)  SpO2: 97% (08-29-23 @ 05:57) (97% - 97%)  I&O's Summary    28 Aug 2023 07:01  -  29 Aug 2023 06:27  --------------------------------------------------------  IN: 0 mL / OUT: 100 mL / NET: -100 mL        PHYSICAL EXAM:  Constitutional: NAD, comfortable in bed  HEENT: normocephalic, atraumatic; mucous membranes pink, without erythema or exudates, poor oral hygiene and dentition - missing numerous upper & lower teeth.   Neck: supple, without adenopathy; carotid pulses 2+ bilaterally; no JVD.   Cardiac: regular heart rate and rhythm, without murmurs, rubs, or gallops.   Respiratory: chest wall symmetric and atraumatic; no signs of respiratory distress; lung sounds clear in all lobes, without wheezes, rales, or rhonchi.   Abdominal: soft, non-tender, non-distended, + BS  Back: Left percutaneous nephrolithotomy in place, covered by clean, dry bandage, light-red/orange fluid collection, fruit-punch-colored  Psych: normal affect  MSK: no joint swelling            LABS:                        14.8   5.46  )-----------( 293      ( 28 Aug 2023 10:27 )             45.7     08-28    135  |  100  |  21  ----------------------------<  115<H>  4.1   |  27  |  1.69<H>    Ca    8.8      28 Aug 2023 10:27  Phos  2.9     08-28  Mg     2.0     08-28    TPro  6.3  /  Alb  3.3  /  TBili  0.3  /  DBili  x   /  AST  17  /  ALT  10  /  AlkPhos  68  08-28      Urinalysis Basic - ( 28 Aug 2023 10:27 )    Color: x / Appearance: x / SG: x / pH: x  Gluc: 115 mg/dL / Ketone: x  / Bili: x / Urobili: x   Blood: x / Protein: x / Nitrite: x   Leuk Esterase: x / RBC: x / WBC x   Sq Epi: x / Non Sq Epi: x / Bacteria: x          RADIOLOGY & ADDITIONAL TESTS:    MEDICATIONS  (STANDING):  acetaminophen     Tablet .. 975 milliGRAM(s) Oral every 8 hours  dextrose 5% + sodium chloride 0.9%. 1000 milliLiter(s) (100 mL/Hr) IV Continuous <Continuous>  enoxaparin Injectable 40 milliGRAM(s) SubCutaneous every 24 hours  levothyroxine 150 MICROGram(s) Oral daily  lidocaine   4% Patch 1 Patch Transdermal every 24 hours  melatonin 3 milliGRAM(s) Oral at bedtime  polyethylene glycol 3350 17 Gram(s) Oral two times a day  senna 2 Tablet(s) Oral at bedtime    MEDICATIONS  (PRN):  ondansetron    Tablet 4 milliGRAM(s) Oral every 8 hours PRN Nausea and/or Vomiting  oxyCODONE    IR 2.5 milliGRAM(s) Oral every 6 hours PRN moderate and severe pain

## 2023-08-29 NOTE — PROGRESS NOTE ADULT - PROBLEM SELECTOR PLAN 1
improved  - upright KUB with substantial constipation - enema, bowel regimen  - CT abd/pelvis significant for L kidney inflammation & fat stranding - started standing anti-inflammatory (tylenol)

## 2023-08-29 NOTE — PROGRESS NOTE ADULT - PROBLEM SELECTOR PLAN 1
04/2023, TSH was 8.38 and Free T4 was 0.744.    08/21/2023, TSH 2613 and Free T4 <0.378. Levothyroxine 150mcg started on 8/22. He declined 3/9 of the doses since.  08/29/2023, TSH 37.8 and Free T4 0.517  - please continue levothyroxine 150mcg. Advised patient to not decline any further doses so we can determine appropriate dose at discharge.  - recheck TFTs outpatient   - Please take levothyroxine in the empty stomach in the morning at least one hour before other medications and two hours before breakfast.  - Discharge recommendations: reduce dose to levothyroxine 125mcg   - Patient can follow up at discharge with [facility] by calling [phone] to make an appointment.      Case discussed with Dr. Marsh. Primary team updated.

## 2023-08-29 NOTE — PROGRESS NOTE ADULT - PROBLEM SELECTOR PLAN 4
s/p attempted unsuccessful L URS on 8/21. Distal-mid ureter noted to be entirely obstructed vs obliterated with inability to pass wire or contrast retrograde beyond area of obstruction. Now s/p IR L PCN 8/22.

## 2023-08-29 NOTE — PROGRESS NOTE ADULT - PROBLEM SELECTOR PLAN 2
-pt reports nausea has resolved and no vomiting reported since admission.    -In the last 24hrs, he used no zofran 4mg q8hr PRN for Nausea/vomiting  - appetite slightly better.   -EKG -  on 8/23.

## 2023-08-29 NOTE — PROGRESS NOTE ADULT - PROBLEM SELECTOR PLAN 4
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 Patient underwent biopsy that did not yield bacterial data and refused discharge to [facility] for IV antibiotics while this was worked up. Patient with consistently low ESR/CRP/leukocytosis and no fevers.   Repeat MR lumbar also showing OM vs discitis, though no progression of disease. History of back injections for pain management. Planned for dual isotope bone/gallium scan with SPECT CT with ID for differentiation.   Bx Culture: Gram positive cocci in clusters, methicillin-resistant staph epi  - follow up outpatient for dual isotope bone/gallium scan with SPECT CT and work up for back pain.   - Would hold antibiotics at this time given prolonged duration off of antibiotic therapy without progression  - Continue home gabapentin dose  - pain control

## 2023-08-29 NOTE — PROGRESS NOTE ADULT - PROBLEM SELECTOR PLAN 3
-pt reports no pain improvement  -in last 24hrs used 0 prn of oxycodone 2.5mg q6hr PRN for moderate-severe pain   -c/w oxycodone PRN   - increased tylenol to 975mg q8hr ATC  -lidocaine patch.

## 2023-08-29 NOTE — PROGRESS NOTE ADULT - ASSESSMENT
Mr. Healy is a 63 year old male with a past medical history of chronic back pain with concerns for L4/L5 osteomyelitis, alcohol use without withdrawal history who was brought in to [facility] after being found sleeping on the ground intoxicated, admitted for further management of possible osteomyelitis and hydronephrosis, s/p L PCN placement.

## 2023-08-29 NOTE — PROGRESS NOTE ADULT - ASSESSMENT
REJI BLAKE is a 63y Male with a past medical history of chronic back pain, hypothyroidism, extensive alcohol use disorder was brought to [facility] after being found down on the ground, admitted to medicine service on 08/21 for concern of osteomyelitis and etoh intoxication.       Pt has a long standing of left hydronephrosis and was planned for outpatient left ureteroscopy with endopyelotomy which patient missed the appointment.   Patient again had severe chronic left hydronephrosis during this admission.  Urology was consulted and patient underwent Cystoscopy with left ureteroscopy - S/p attempted unsuccessful L URS on 8/21/23. Distal-mid ureter noted to be entirely obstructed vs obliterated with inability to pass wire or contrast retrograde beyond area of obstruction.  Pt has now been taken to IR for left nephrostomy tube placement.    On admission, patient found to have TSH was 26.13 and Free T4 <0.378 on 08/21.  During admission in 04/2023, TSH was 8.38 and Free T4 was 0.744.  Patient was discharged with Home dose synthroid 112mcg.  Endocrinology has been consulted for Management of Hypothyroidism. We restarted levothyroxine 150mcg, and he has declined 3 doses since. Free T4 has improved, but not as much as expected, possibly due to missed dose.  Weight: 85kg

## 2023-08-29 NOTE — PROGRESS NOTE ADULT - SUBJECTIVE AND OBJECTIVE BOX
[facility]   Palliative Care Fellow  Contact Info: Call [phone] (HEAL Line)     SUBJECTIVE AND OBJECTIVE: pt reports nausea has resolved. Pt has no pain.  no Bowel movement has occurred since  8/27. Appetite improved slightly more this morning. He has required no PRns for pain/nausea.     INTERVAL HPI/OVERNIGHT EVENTS: Interval events noted. See patient's PRN use for the past 24hrs noted below. Comprehensive symptom assessment and GOC exploration as noted below. Extensive time spent discussing plan of care with patient/family. No unexpected adverse effects of opiates noted: no sedation/dizziness/nausea.     ALLERGIES:  No Known Allergies    MEDICATIONS  (STANDING):  acetaminophen     Tablet .. 975 milliGRAM(s) Oral every 8 hours  dextrose 5% + sodium chloride 0.9%. 1000 milliLiter(s) (100 mL/Hr) IV Continuous <Continuous>  enoxaparin Injectable 40 milliGRAM(s) SubCutaneous every 24 hours  levothyroxine 150 MICROGram(s) Oral daily  lidocaine   4% Patch 1 Patch Transdermal every 24 hours  melatonin 3 milliGRAM(s) Oral at bedtime  polyethylene glycol 3350 17 Gram(s) Oral two times a day  senna 2 Tablet(s) Oral at bedtime    MEDICATIONS  (PRN):  ondansetron    Tablet 4 milliGRAM(s) Oral every 8 hours PRN Nausea and/or Vomiting  oxyCODONE    IR 2.5 milliGRAM(s) Oral every 6 hours PRN moderate and severe pain      Analgesic Use (Scheduled and PRNs) for past 24 hours:      ITEMS UNCHECKED ARE NOT PRESENT  PRESENT SYMPTOMS/REVIEW OF SYSTEMS: []Unable to obtain due to poor mentation   Source if other than patient:  []Family   []Team     Pain: [] yes [x] no  QOL impact -   Location -                    Aggravating Factors -  Quality -  Radiation -  Timing -  Severity (0-10 scale) -   Minimal Acceptable Level (0-10 scale) -      Dyspnea:                           []Mild  []Moderate []Severe  Anxiety:                             []Mild []Moderate []Severe  Fatigue:                             []Mild []Moderate []Severe  Nausea:                             []Mild []Moderate []Severe  Loss of Appetite:              []Mild []Moderate []Severe  Constipation:                    []Mild []Moderate []Severe    Other Symptoms:  [x]All other review of systems negative     Vital Signs Last 24 Hrs  T(C): 36.9 (29 Aug 2023 05:57), Max: 37 (28 Aug 2023 15:29)  T(F): 98.4 (29 Aug 2023 05:57), Max: 98.6 (28 Aug 2023 15:29)  HR: 71 (29 Aug 2023 05:57) (64 - 71)  BP: 118/83 (29 Aug 2023 05:57) (118/83 - 134/91)  BP(mean): --  RR: 18 (29 Aug 2023 05:57) (16 - 18)  SpO2: 97% (29 Aug 2023 05:57) (97% - 99%)    Parameters below as of 28 Aug 2023 15:29  Patient On (Oxygen Delivery Method): room air      GENERAL:  [x] NAD []Alert []Lethargic  []Cachexia  []Unarousable  [x]Verbal  []Non-Verbal  BEHAVIORAL:   []Anxiety  []Delirium []Agitation []Cooperative [x]Oriented x 3  HEENT:  [x]Normal  [] Moist Mucous Membranes []Dry mouth   []ET Tube/Trach  []Oral lesions  PULMONARY:   [x]Clear []Tachypnea  []Audible excessive secretions  []Normal Work of Breathing []Labored Breathing  []Rhonchi []Crackles []Wheezing  CARDIOVASCULAR:    [x]Regular Rate []Regular Rhythm []Irregular []Tachy  [][place]  GASTROINTESTINAL:  [x]Soft  []Distended   []+BS  [x]Non tender []Tender  []PEG []OGT/ NGT  Last BM: 8/27  GENITOURINARY:  []Normal [] Incontinent   []Oliguria/Anuria   [][place] [x] left Nephrostomy tube  MUSCULOSKELETAL:   []Normal Extremities  [x]Weakness  []Bed/Wheelchair bound []Edema  NEUROLOGIC:   []No focal deficits  []Cognitive impairment  []Dysphagia []Dysarthria []Paresis []Encephalopathic  SKIN:   []Normal   []Pressure ulcer(s)  []Rash    LABS:                         14.4   4.99  )-----------( 338      ( 29 Aug 2023 05:30 )             44.7   08-29    134<L>  |  100  |  17  ----------------------------<  104<H>  4.0   |  26  |  1.49<H>    Ca    8.6      29 Aug 2023 05:30  Phos  2.7     08-29  Mg     1.9     08-29    TPro  6.3  /  Alb  3.3  /  TBili  0.3  /  DBili  x   /  AST  17  /  ALT  10  /  AlkPhos  68  08-28      RADIOLOGY & ADDITIONAL STUDIES: None new    DISCUSSION OF CASE: Family - to provide updates and emotional support

## 2023-08-29 NOTE — PROGRESS NOTE ADULT - ASSESSMENT
64yo Male from assisted living facility h/o chronic back pain (? L4/L5 osteomyelitis), CKD w/ L hydronephrosis, alcohol use disorder, hypothyroidism p/w intoxication and admitted for management of hydronephrosis (s/p L PCN). palliative consulted for constipation and nausea.

## 2023-08-29 NOTE — PROGRESS NOTE ADULT - SUBJECTIVE AND OBJECTIVE BOX
SUBJECTIVE / INTERVAL HPI: Patient was seen and examined this morning. Repeat TFTs done. Pt had severe nausea, vomiting, and constipation, which is improving per pt. He received enemas with good stool return. Nausea and "retching" have since decreased, and he is tolerating some food. He has declined some levothyroxine doses since initiation.     CAPILLARY BLOOD GLUCOSE & INSULIN RECEIVED  115 mg/dL (08-28 @ 10:27)  104 mg/dL (08-29 @ 05:30)    REVIEW OF SYSTEMS  Constitutional:  Negative fever, chills or loss of appetite.  Eyes:  Negative blurry vision or double vision.  Cardiovascular:  Negative for chest pain or palpitations.  Respiratory:  Negative for cough, wheezing, or shortness of breath.    Gastrointestinal:  Negative for nausea, vomiting, diarrhea, constipation, or abdominal pain.  Genitourinary:  Negative frequency, urgency or dysuria.  Neurologic:  No headache, confusion, dizziness, lightheadedness.    PHYSICAL EXAM  Vital Signs Last 24 Hrs  T(C): 37 (29 Aug 2023 15:28), Max: 37 (29 Aug 2023 15:28)  T(F): 98.6 (29 Aug 2023 15:28), Max: 98.6 (29 Aug 2023 15:28)  HR: 68 (29 Aug 2023 15:28) (68 - 71)  BP: 163/80 (29 Aug 2023 15:28) (118/83 - 163/80)  BP(mean): --  RR: 18 (29 Aug 2023 15:28) (18 - 18)  SpO2: 98% (29 Aug 2023 15:28) (97% - 98%)    Parameters below as of 29 Aug 2023 15:28  Patient On (Oxygen Delivery Method): room air    Constitutional: Awake, alert, in no acute distress.   HEENT: Normocephalic, atraumatic, ZENOBIA.  Respiratory: Lungs clear to ausculation bilaterally.   Cardiovascular: regular rhythm, normal S1 and S2, no audible murmurs.   GI: soft, non-tender, non-distended, bowel sounds present. + left nephrostomy tube   Extremities: No lower extremity edema.  Psychiatric: AAO x 3. Normal affect/mood.     LABS  CBC - WBC/HGB/HTC/PLT: 4.99/14.4/44.7/338 (08-29-23)  BMP - Na/K/Cl/Bicarb/BUN/Cr/Gluc/AG/eGFR: 134/4.0/100/26/17/1.49/104/8/53 (08-29-23)  Ca - 8.6 (08-29-23)  Phos - 2.7 (08-29-23)  Mg - 1.9 (08-29-23)  LFT - Alb/Tprot/Tbili/Dbili/AlkPhos/ALT/AST: 3.3/--/0.3/--/68/10/17 (08-28-23)    Thyroid Stimulating Hormone, Serum: 37.830 (08-29-23)  Total T4/Free T4: --/0.517 (08-29-23)    MEDICATIONS  MEDICATIONS  (STANDING):  acetaminophen     Tablet .. 975 milliGRAM(s) Oral every 8 hours  dextrose 5% + sodium chloride 0.9%. 1000 milliLiter(s) (100 mL/Hr) IV Continuous <Continuous>  enoxaparin Injectable 40 milliGRAM(s) SubCutaneous every 24 hours  levothyroxine 150 MICROGram(s) Oral daily  lidocaine   4% Patch 1 Patch Transdermal every 24 hours  melatonin 3 milliGRAM(s) Oral at bedtime  polyethylene glycol 3350 17 Gram(s) Oral two times a day  senna 2 Tablet(s) Oral at bedtime    MEDICATIONS  (PRN):  ondansetron    Tablet 4 milliGRAM(s) Oral every 8 hours PRN Nausea and/or Vomiting  oxyCODONE    IR 2.5 milliGRAM(s) Oral every 6 hours PRN moderate and severe pain

## 2023-08-29 NOTE — PROGRESS NOTE ADULT - SUBJECTIVE AND OBJECTIVE BOX
63M with a past medical history of chronic back pain with concerns for L4/L5 osteomyelitis, alcohol abuse without withdrawal history who was brought in to [facility] after being found sleeping on the ground intoxicated, admitted for further management of possible osteomyelitis and hydronephrosis. S/p attempted unsuccessful L URS on 8/21/23. Distal-mid ureter noted to be entirely obstructed vs obliterated with inability to pass wire or contrast retrograde beyond area of obstruction. Urology following, recommending nephrostomy placement. IR consulted for left PCN placed 8/22/23.    L PCN with fruit punch colored output, 100ml. Dressing c/d/i.     IR will continue to follow. Please record drain outputs.

## 2023-08-29 NOTE — PROGRESS NOTE ADULT - PROBLEM SELECTOR PLAN 7
-pt is full code. hcp is mother.  -palliative consulted for assistance with nausea and constipation. Nausea resolved and last BM was on 8/27.   -Dispo: pt going to [facility] when medically cleared. when pt is Discharged he should  continue for pain - tylenol 975mg q8hr and should continue bowel regimen: miralax BID and senna 2 tabs HS.

## 2023-08-29 NOTE — PROGRESS NOTE ADULT - PROBLEM SELECTOR PLAN 5
Diagnosed in April on Synthroid 150 mcg daily, frequently refusing medication  - TSH elevated (37 on 8/29), i/s/o frequent medication refusal, discussed medications & tx objectives w pt, will continue to encourage medication adherence  - otherwise continue home synthroid as prescribed

## 2023-08-30 LAB
ALBUMIN SERPL ELPH-MCNC: 3.3 G/DL — SIGNIFICANT CHANGE UP (ref 3.3–5)
ALP SERPL-CCNC: 65 U/L — SIGNIFICANT CHANGE UP (ref 40–120)
ALT FLD-CCNC: 10 U/L — SIGNIFICANT CHANGE UP (ref 10–45)
ANION GAP SERPL CALC-SCNC: 6 MMOL/L — SIGNIFICANT CHANGE UP (ref 5–17)
APPEARANCE UR: ABNORMAL
AST SERPL-CCNC: 13 U/L — SIGNIFICANT CHANGE UP (ref 10–40)
BACTERIA # UR AUTO: ABNORMAL /HPF
BILIRUB SERPL-MCNC: 0.2 MG/DL — SIGNIFICANT CHANGE UP (ref 0.2–1.2)
BILIRUB UR-MCNC: NEGATIVE — SIGNIFICANT CHANGE UP
BUN SERPL-MCNC: 19 MG/DL — SIGNIFICANT CHANGE UP (ref 7–23)
CALCIUM SERPL-MCNC: 8.7 MG/DL — SIGNIFICANT CHANGE UP (ref 8.4–10.5)
CHLORIDE SERPL-SCNC: 100 MMOL/L — SIGNIFICANT CHANGE UP (ref 96–108)
CO2 SERPL-SCNC: 26 MMOL/L — SIGNIFICANT CHANGE UP (ref 22–31)
COLOR SPEC: YELLOW — SIGNIFICANT CHANGE UP
CREAT ?TM UR-MCNC: 108 MG/DL — SIGNIFICANT CHANGE UP
CREAT SERPL-MCNC: 1.63 MG/DL — HIGH (ref 0.5–1.3)
DIFF PNL FLD: NEGATIVE — SIGNIFICANT CHANGE UP
EGFR: 47 ML/MIN/1.73M2 — LOW
EPI CELLS # UR: SIGNIFICANT CHANGE UP /HPF (ref 0–5)
GLUCOSE SERPL-MCNC: 114 MG/DL — HIGH (ref 70–99)
GLUCOSE UR QL: NEGATIVE — SIGNIFICANT CHANGE UP
HCT VFR BLD CALC: 44.4 % — SIGNIFICANT CHANGE UP (ref 39–50)
HGB BLD-MCNC: 14.2 G/DL — SIGNIFICANT CHANGE UP (ref 13–17)
KETONES UR-MCNC: NEGATIVE — SIGNIFICANT CHANGE UP
LEUKOCYTE ESTERASE UR-ACNC: ABNORMAL
MAGNESIUM SERPL-MCNC: 2.1 MG/DL — SIGNIFICANT CHANGE UP (ref 1.6–2.6)
MCHC RBC-ENTMCNC: 27.4 PG — SIGNIFICANT CHANGE UP (ref 27–34)
MCHC RBC-ENTMCNC: 32 GM/DL — SIGNIFICANT CHANGE UP (ref 32–36)
MCV RBC AUTO: 85.5 FL — SIGNIFICANT CHANGE UP (ref 80–100)
NITRITE UR-MCNC: NEGATIVE — SIGNIFICANT CHANGE UP
NRBC # BLD: 0 /100 WBCS — SIGNIFICANT CHANGE UP (ref 0–0)
PH UR: 7 — SIGNIFICANT CHANGE UP (ref 5–8)
PHOSPHATE SERPL-MCNC: 2.8 MG/DL — SIGNIFICANT CHANGE UP (ref 2.5–4.5)
PLATELET # BLD AUTO: 312 K/UL — SIGNIFICANT CHANGE UP (ref 150–400)
POTASSIUM SERPL-MCNC: 3.9 MMOL/L — SIGNIFICANT CHANGE UP (ref 3.5–5.3)
POTASSIUM SERPL-SCNC: 3.9 MMOL/L — SIGNIFICANT CHANGE UP (ref 3.5–5.3)
PROT SERPL-MCNC: 6.2 G/DL — SIGNIFICANT CHANGE UP (ref 6–8.3)
PROT UR-MCNC: 30 MG/DL
RBC # BLD: 5.19 M/UL — SIGNIFICANT CHANGE UP (ref 4.2–5.8)
RBC # FLD: 16.3 % — HIGH (ref 10.3–14.5)
RBC CASTS # UR COMP ASSIST: < 5 /HPF — SIGNIFICANT CHANGE UP
SODIUM SERPL-SCNC: 132 MMOL/L — LOW (ref 135–145)
SODIUM UR-SCNC: 77 MMOL/L — SIGNIFICANT CHANGE UP
SP GR SPEC: 1.01 — SIGNIFICANT CHANGE UP (ref 1–1.03)
UROBILINOGEN FLD QL: 0.2 E.U./DL — SIGNIFICANT CHANGE UP
WBC # BLD: 5.94 K/UL — SIGNIFICANT CHANGE UP (ref 3.8–10.5)
WBC # FLD AUTO: 5.94 K/UL — SIGNIFICANT CHANGE UP (ref 3.8–10.5)
WBC UR QL: < 5 /HPF — SIGNIFICANT CHANGE UP

## 2023-08-30 PROCEDURE — 99232 SBSQ HOSP IP/OBS MODERATE 35: CPT | Mod: GC

## 2023-08-30 RX ADMIN — Medication 150 MICROGRAM(S): at 06:26

## 2023-08-30 NOTE — PROGRESS NOTE ADULT - ASSESSMENT
Mr. Healy is a 63 year old male with a past medical history of chronic back pain with concerns for L4/L5 osteomyelitis, alcohol use without withdrawal history who was brought in to [facility] after being found sleeping on the ground intoxicated, admitted for further management of possible osteomyelitis and hydronephrosis, s/p L PCN placement. Mr. Healy is a 63 year old male with a past medical history of chronic back pain with concerns for L4/L5 osteomyelitis, alcohol use without withdrawal history who was brought in to [facility] after being found sleeping on the ground intoxicated, admitted for further management of possible osteomyelitis (deemed stable, will f/u outpatient) and hydronephrosis (s/p L PCN placement) currently pending MOSHE placement

## 2023-08-30 NOTE — PROGRESS NOTE ADULT - PROBLEM SELECTOR PLAN 4
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 Patient underwent biopsy that did not yield bacterial data and refused discharge to [facility] for IV antibiotics while this was worked up. Patient with consistently low ESR/CRP/leukocytosis and no fevers.   Repeat MR lumbar also showing OM vs discitis, though no progression of disease. History of back injections for pain management. Planned for dual isotope bone/gallium scan with SPECT CT with ID for differentiation.   Bx Culture: Gram positive cocci in clusters, methicillin-resistant staph epi  - follow up outpatient for dual isotope bone/gallium scan with SPECT CT and work up for back pain.   - Would hold antibiotics at this time given prolonged duration off of antibiotic therapy without progression  - Continue home gabapentin dose  - pain control

## 2023-08-30 NOTE — PROGRESS NOTE ADULT - PROBLEM SELECTOR PLAN 1
improved  - upright KUB with substantial constipation - enema, bowel regimen  - CT abd/pelvis significant for L kidney inflammation & fat stranding - started standing anti-inflammatory (tylenol) improved  - upright KUB with substantial constipation - enema, bowel regimen  - CT abd/pelvis significant for L kidney inflammation & fat stranding - started anti-inflammatory (tylenol)

## 2023-08-30 NOTE — PROGRESS NOTE ADULT - SUBJECTIVE AND OBJECTIVE BOX
**INCOMPLETE NOTE    OVERNIGHT EVENTS: None    SUBJECTIVE:  Patient seen and examined at bedside, comfortable, NAD. Denied fever, chest pain, dyspnea, abdominal pain.     Vital Signs Last 12 Hrs  T(F): 98.7 (08-30-23 @ 05:45), Max: 98.9 (08-29-23 @ 21:00)  HR: 80 (08-30-23 @ 05:45) (80 - 89)  BP: 128/86 (08-30-23 @ 05:45) (101/80 - 128/86)  BP(mean): --  RR: 18 (08-30-23 @ 05:45) (18 - 18)  SpO2: 97% (08-30-23 @ 05:45) (97% - 98%)  I&O's Summary    29 Aug 2023 07:01  -  30 Aug 2023 07:00  --------------------------------------------------------  IN: 0 mL / OUT: 30 mL / NET: -30 mL        PHYSICAL EXAM:  Constitutional: NAD, comfortable in bed.  HEENT: NC/AT, PERRLA, EOMI, no conjunctival pallor or scleral icterus, MMM  Neck: Supple, no JVD  Respiratory: CTA B/L. No w/r/r.   Cardiovascular: RRR, normal S1 and S2, no m/r/g.   Gastrointestinal: +BS, soft NTND, no guarding or rebound tenderness, no palpable masses   Extremities: wwp; no cyanosis, clubbing or edema.   Vascular: Pulses equal and strong throughout.   Neurological: AAOx3, no CN deficits, strength and sensation intact throughout.   Skin: No gross skin abnormalities or rashes        LABS:                        14.2   5.94  )-----------( 312      ( 30 Aug 2023 06:37 )             44.4     08-30    132<L>  |  100  |  19  ----------------------------<  114<H>  3.9   |  26  |  1.63<H>    Ca    8.7      30 Aug 2023 06:37  Phos  2.8     08-30  Mg     2.1     08-30    TPro  6.2  /  Alb  3.3  /  TBili  0.2  /  DBili  x   /  AST  13  /  ALT  10  /  AlkPhos  65  08-30      Urinalysis Basic - ( 30 Aug 2023 06:37 )    Color: x / Appearance: x / SG: x / pH: x  Gluc: 114 mg/dL / Ketone: x  / Bili: x / Urobili: x   Blood: x / Protein: x / Nitrite: x   Leuk Esterase: x / RBC: x / WBC x   Sq Epi: x / Non Sq Epi: x / Bacteria: x          RADIOLOGY & ADDITIONAL TESTS:    MEDICATIONS  (STANDING):  acetaminophen     Tablet .. 975 milliGRAM(s) Oral every 8 hours  dextrose 5% + sodium chloride 0.9%. 1000 milliLiter(s) (100 mL/Hr) IV Continuous <Continuous>  enoxaparin Injectable 40 milliGRAM(s) SubCutaneous every 24 hours  levothyroxine 150 MICROGram(s) Oral daily  lidocaine   4% Patch 1 Patch Transdermal every 24 hours  melatonin 3 milliGRAM(s) Oral at bedtime  polyethylene glycol 3350 17 Gram(s) Oral two times a day  senna 2 Tablet(s) Oral at bedtime    MEDICATIONS  (PRN):  ondansetron    Tablet 4 milliGRAM(s) Oral every 8 hours PRN Nausea and/or Vomiting  oxyCODONE    IR 2.5 milliGRAM(s) Oral every 6 hours PRN moderate and severe pain   OVERNIGHT EVENTS: Pt received enema & had 1BM ovn    SUBJECTIVE:  Patient seen and examined at bedside, comfortable, NAD. Denied fever, chest pain, dyspnea, abdominal pain.  reports continued sx'atic improvedment, no nausea/vomiting    Vital Signs Last 12 Hrs  T(F): 98.7 (08-30-23 @ 05:45), Max: 98.9 (08-29-23 @ 21:00)  HR: 80 (08-30-23 @ 05:45) (80 - 89)  BP: 128/86 (08-30-23 @ 05:45) (101/80 - 128/86)  BP(mean): --  RR: 18 (08-30-23 @ 05:45) (18 - 18)  SpO2: 97% (08-30-23 @ 05:45) (97% - 98%)  I&O's Summary    29 Aug 2023 07:01  -  30 Aug 2023 07:00  --------------------------------------------------------  IN: 0 mL / OUT: 30 mL / NET: -30 mL        PHYSICAL EXAM:  Constitutional: pt curled up in bed, under covers  HEENT: normocephalic, atraumatic; mucous membranes pink, without erythema or exudates, poor oral hygiene and dentition - missing numerous upper & lower teeth.   Neck: supple, without adenopathy; carotid pulses 2+ bilaterally; no JVD.   Cardiac: regular heart rate and rhythm, without murmurs, rubs, or gallops.   Respiratory: chest wall symmetric and atraumatic; no signs of respiratory distress; lung sounds clear in all lobes, without wheezes, rales, or rhonchi.   Abdominal: soft, non-tender, non-distended, + BS  Back: Left percutaneous nephrolithotomy in place, covered by clean, dry bandage, light-red/orange fluid collection, fruit-punch-colored  Psych: normal affect  MSK: no joint swelling        LABS:                        14.2   5.94  )-----------( 312      ( 30 Aug 2023 06:37 )             44.4     08-30    132<L>  |  100  |  19  ----------------------------<  114<H>  3.9   |  26  |  1.63<H>    Ca    8.7      30 Aug 2023 06:37  Phos  2.8     08-30  Mg     2.1     08-30    TPro  6.2  /  Alb  3.3  /  TBili  0.2  /  DBili  x   /  AST  13  /  ALT  10  /  AlkPhos  65  08-30      Urinalysis Basic - ( 30 Aug 2023 06:37 )    Color: x / Appearance: x / SG: x / pH: x  Gluc: 114 mg/dL / Ketone: x  / Bili: x / Urobili: x   Blood: x / Protein: x / Nitrite: x   Leuk Esterase: x / RBC: x / WBC x   Sq Epi: x / Non Sq Epi: x / Bacteria: x          RADIOLOGY & ADDITIONAL TESTS:    MEDICATIONS  (STANDING):  acetaminophen     Tablet .. 975 milliGRAM(s) Oral every 8 hours  dextrose 5% + sodium chloride 0.9%. 1000 milliLiter(s) (100 mL/Hr) IV Continuous <Continuous>  enoxaparin Injectable 40 milliGRAM(s) SubCutaneous every 24 hours  levothyroxine 150 MICROGram(s) Oral daily  lidocaine   4% Patch 1 Patch Transdermal every 24 hours  melatonin 3 milliGRAM(s) Oral at bedtime  polyethylene glycol 3350 17 Gram(s) Oral two times a day  senna 2 Tablet(s) Oral at bedtime    MEDICATIONS  (PRN):  ondansetron    Tablet 4 milliGRAM(s) Oral every 8 hours PRN Nausea and/or Vomiting  oxyCODONE    IR 2.5 milliGRAM(s) Oral every 6 hours PRN moderate and severe pain

## 2023-08-30 NOTE — PROGRESS NOTE ADULT - PROBLEM SELECTOR PLAN 5
Diagnosed in April on Synthroid 150 mcg daily, frequently refusing medication  - TSH elevated (37 on 8/29), i/s/o frequent medication refusal, discussed medications & tx objectives w pt, will continue to encourage medication adherence  - otherwise continue home synthroid as prescribed Diagnosed in April on Synthroid 150 mcg daily, frequently refusing medication  - TSH elevated (37 on 8/29), i/s/o frequent medication refusal, discussed medications & tx objectives w pt, will continue to encourage medication adherence  - endocrine met with pt & discussed future rx administration for optimal medication function  - otherwise continue home synthroid as prescribed

## 2023-08-30 NOTE — PROGRESS NOTE ADULT - SUBJECTIVE AND OBJECTIVE BOX
63M with a past medical history of chronic back pain with concerns for L4/L5 osteomyelitis, alcohol abuse without withdrawal history who was brought in to [facility] after being found sleeping on the ground intoxicated, admitted for further management of possible osteomyelitis and hydronephrosis. S/p attempted unsuccessful L URS on 8/21/23. Distal-mid ureter noted to be entirely obstructed vs obliterated with inability to pass wire or contrast retrograde beyond area of obstruction. Urology following, recommending nephrostomy placement. IR consulted for left PCN placed 8/22/23.    L PCN with fruit punch colored output, 100ml. Dressing c/d/i.     IR will continue to follow. Please record drain outputs.

## 2023-08-31 LAB
ALBUMIN SERPL ELPH-MCNC: 3.3 G/DL — SIGNIFICANT CHANGE UP (ref 3.3–5)
ALP SERPL-CCNC: 60 U/L — SIGNIFICANT CHANGE UP (ref 40–120)
ALT FLD-CCNC: 10 U/L — SIGNIFICANT CHANGE UP (ref 10–45)
ANION GAP SERPL CALC-SCNC: 7 MMOL/L — SIGNIFICANT CHANGE UP (ref 5–17)
AST SERPL-CCNC: 11 U/L — SIGNIFICANT CHANGE UP (ref 10–40)
BASOPHILS # BLD AUTO: 0.06 K/UL — SIGNIFICANT CHANGE UP (ref 0–0.2)
BASOPHILS NFR BLD AUTO: 1.3 % — SIGNIFICANT CHANGE UP (ref 0–2)
BILIRUB SERPL-MCNC: 0.2 MG/DL — SIGNIFICANT CHANGE UP (ref 0.2–1.2)
BUN SERPL-MCNC: 27 MG/DL — HIGH (ref 7–23)
CALCIUM SERPL-MCNC: 8.6 MG/DL — SIGNIFICANT CHANGE UP (ref 8.4–10.5)
CHLORIDE SERPL-SCNC: 104 MMOL/L — SIGNIFICANT CHANGE UP (ref 96–108)
CO2 SERPL-SCNC: 27 MMOL/L — SIGNIFICANT CHANGE UP (ref 22–31)
CREAT SERPL-MCNC: 1.59 MG/DL — HIGH (ref 0.5–1.3)
EGFR: 49 ML/MIN/1.73M2 — LOW
EOSINOPHIL # BLD AUTO: 0.19 K/UL — SIGNIFICANT CHANGE UP (ref 0–0.5)
EOSINOPHIL NFR BLD AUTO: 4.1 % — SIGNIFICANT CHANGE UP (ref 0–6)
GLUCOSE SERPL-MCNC: 96 MG/DL — SIGNIFICANT CHANGE UP (ref 70–99)
HCT VFR BLD CALC: 40.8 % — SIGNIFICANT CHANGE UP (ref 39–50)
HGB BLD-MCNC: 13.2 G/DL — SIGNIFICANT CHANGE UP (ref 13–17)
IMM GRANULOCYTES NFR BLD AUTO: 0.4 % — SIGNIFICANT CHANGE UP (ref 0–0.9)
LYMPHOCYTES # BLD AUTO: 1.63 K/UL — SIGNIFICANT CHANGE UP (ref 1–3.3)
LYMPHOCYTES # BLD AUTO: 34.8 % — SIGNIFICANT CHANGE UP (ref 13–44)
MAGNESIUM SERPL-MCNC: 1.9 MG/DL — SIGNIFICANT CHANGE UP (ref 1.6–2.6)
MCHC RBC-ENTMCNC: 27.4 PG — SIGNIFICANT CHANGE UP (ref 27–34)
MCHC RBC-ENTMCNC: 32.4 GM/DL — SIGNIFICANT CHANGE UP (ref 32–36)
MCV RBC AUTO: 84.6 FL — SIGNIFICANT CHANGE UP (ref 80–100)
MONOCYTES # BLD AUTO: 0.5 K/UL — SIGNIFICANT CHANGE UP (ref 0–0.9)
MONOCYTES NFR BLD AUTO: 10.7 % — SIGNIFICANT CHANGE UP (ref 2–14)
NEUTROPHILS # BLD AUTO: 2.28 K/UL — SIGNIFICANT CHANGE UP (ref 1.8–7.4)
NEUTROPHILS NFR BLD AUTO: 48.7 % — SIGNIFICANT CHANGE UP (ref 43–77)
NRBC # BLD: 0 /100 WBCS — SIGNIFICANT CHANGE UP (ref 0–0)
PHOSPHATE SERPL-MCNC: 3.3 MG/DL — SIGNIFICANT CHANGE UP (ref 2.5–4.5)
PLATELET # BLD AUTO: 280 K/UL — SIGNIFICANT CHANGE UP (ref 150–400)
POTASSIUM SERPL-MCNC: 4.3 MMOL/L — SIGNIFICANT CHANGE UP (ref 3.5–5.3)
POTASSIUM SERPL-SCNC: 4.3 MMOL/L — SIGNIFICANT CHANGE UP (ref 3.5–5.3)
PROT SERPL-MCNC: 5.9 G/DL — LOW (ref 6–8.3)
RBC # BLD: 4.82 M/UL — SIGNIFICANT CHANGE UP (ref 4.2–5.8)
RBC # FLD: 16.4 % — HIGH (ref 10.3–14.5)
SODIUM SERPL-SCNC: 138 MMOL/L — SIGNIFICANT CHANGE UP (ref 135–145)
WBC # BLD: 4.68 K/UL — SIGNIFICANT CHANGE UP (ref 3.8–10.5)
WBC # FLD AUTO: 4.68 K/UL — SIGNIFICANT CHANGE UP (ref 3.8–10.5)

## 2023-08-31 PROCEDURE — 99232 SBSQ HOSP IP/OBS MODERATE 35: CPT | Mod: GC

## 2023-08-31 RX ADMIN — ENOXAPARIN SODIUM 40 MILLIGRAM(S): 100 INJECTION SUBCUTANEOUS at 18:32

## 2023-08-31 RX ADMIN — Medication 975 MILLIGRAM(S): at 04:40

## 2023-08-31 RX ADMIN — Medication 975 MILLIGRAM(S): at 04:19

## 2023-08-31 NOTE — PROGRESS NOTE ADULT - PROBLEM SELECTOR PLAN 1
improved  - upright KUB with substantial constipation - enema, bowel regimen  - CT abd/pelvis significant for L kidney inflammation & fat stranding - started anti-inflammatory (tylenol) Resolved  - 2/2 constipation, cont bowel regimen

## 2023-08-31 NOTE — PROGRESS NOTE ADULT - PROBLEM SELECTOR PLAN 4
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 Patient underwent biopsy that did not yield bacterial data and refused discharge to [facility] for IV antibiotics while this was worked up. Patient with consistently low ESR/CRP/leukocytosis and no fevers.   Repeat MR lumbar also showing OM vs discitis, though no progression of disease. History of back injections for pain management. Planned for dual isotope bone/gallium scan with SPECT CT with ID for differentiation.   Bx Culture: Gram positive cocci in clusters, methicillin-resistant staph epi  - follow up outpatient for dual isotope bone/gallium scan with SPECT CT and work up for back pain.   - Would hold antibiotics at this time given prolonged duration off of antibiotic therapy without progression  - Continue home gabapentin dose  - pain control Patient with chronic back pain and known destruction at L4-L5, with concerning imaging findings for osteomyelitis of the area. Patient underwent biopsy that did not yield bacterial data and refused discharge to [facility] for IV antibiotics while this was worked up. Patient with consistently low ESR/CRP/leukocytosis and no fevers.   Repeat MR lumbar also showing OM vs discitis, though no progression of disease. History of back injections for pain management. Planned for dual isotope bone/gallium scan with SPECT CT with ID for differentiation.   Bx Culture: Gram positive cocci in clusters, methicillin-resistant staph epi  - follow up outpatient for dual isotope bone/gallium scan with SPECT CT and work up for back pain.   - Would hold antibiotics at this time given prolonged duration off of antibiotic therapy without progression  - Continue home gabapentin dose  - pain adequately controlled

## 2023-08-31 NOTE — CHART NOTE - NSCHARTNOTEFT_GEN_A_CORE
Admitting Diagnosis:   Patient is a 63y old  Male who presents with a chief complaint of R/o OM, possible inpatient uretoscopy (31 Aug 2023 05:43)      PAST MEDICAL & SURGICAL HISTORY:  Arthritis  Thyroid disease  DJD (degenerative joint disease)  Chronic back pain  2019 novel coronavirus disease (COVID-19) w/intubation  History of kidney surgery      Current Nutrition Order: regular       PO Intake: Good (%) [   ]  Fair (50-75%) [  x ] Poor (<25%) [   ]    GI Issues: last BM 8/29 per EMR    Skin Integrity: no pressure injuries/edema documented    Labs:   08-31    138  |  104  |  27<H>  ----------------------------<  96  4.3   |  27  |  1.59<H>    Ca    8.6      31 Aug 2023 09:40  Phos  3.3     08-31  Mg     1.9     08-31    TPro  5.9<L>  /  Alb  3.3  /  TBili  0.2  /  DBili  x   /  AST  11  /  ALT  10  /  AlkPhos  60  08-31    Medications:  MEDICATIONS  (STANDING):  acetaminophen     Tablet .. 975 milliGRAM(s) Oral every 8 hours  enoxaparin Injectable 40 milliGRAM(s) SubCutaneous every 24 hours  levothyroxine 150 MICROGram(s) Oral daily  lidocaine   4% Patch 1 Patch Transdermal every 24 hours  melatonin 3 milliGRAM(s) Oral at bedtime  polyethylene glycol 3350 17 Gram(s) Oral two times a day  senna 2 Tablet(s) Oral at bedtime    MEDICATIONS  (PRN):  ondansetron    Tablet 4 milliGRAM(s) Oral every 8 hours PRN Nausea and/or Vomiting      Weight:  Height for BMI (FEET)	5 Feet  Height for BMI (INCHES)	10 Inch(s)  Height for BMI (CENTIMETERS)	177.18 Centimeter(s)  Weight for BMI (lbs)	187 lb  Weight for BMI (kg)	84.8 kg  Body Mass Index	27    Weight Change: no new weights to assess    Estimated energy needs:   Estimated Energy Needs Weight (lbs)	152 lb  Estimated Energy Needs Weight (kg)	68.9 kg  Estimated Energy Needs From (sirisha/kg)	25  Estimated Energy Needs To (sirisha/kg)	30  Estimated Energy Needs Calculated From (sirisha/kg)	1722  Estimated Energy Needs Calculated To (sirisha/kg)	2067    Estimated Protein Needs Weight (lbs)	152 lb  Estimated Protein Needs Weight (kg)	68.9 kg  Estimated Protein Needs From (g/kg)	1  Estimated Protein Needs To (g/kg)	1.2  Estimated Protein Needs Calculated From (g/kg)	68.9  Estimated Protein Needs Calculated To (g/kg)	82.68    Subjective: 63 year old male with a past medical history of chronic back pain with concerns for L4/L5 osteomyelitis, alcohol abuse without withdrawal history who was brought in to [facility] after being found sleeping on the ground intoxicated, admitted for further management of possible osteomyelitis and hydronephrosis.     Patient seen at bedside for follow up assessment. Current diet order: regular. Patient reports his appetite/PO intake has greatly improved since initial assessment and is no longer experiencing nausea/vomiting. Consumed eggs, fruit, juice at breakfast. Denies NVDC, last BM 8/29 per EMR. Elevated BUN, Cr. Meds: bowel regimen, synthroid. RD to f/u prn.    Previous Nutrition Diagnosis: Inadequate Oral Intake related to patient's condition as evidenced by nausea/vomiting.    Active [ x  ]  Resolved [   ]    Goal: Patient to meet at least 75% of nutritional needs consistently     Recommendations:  1. Continue with regular diet  2. Encourage pt to meet nutritional needs as able   3. Monitor labs: electrolytes, cmp  4. Monitor weights   5. Pain and bowel regimen per team   6. Will continue to assess/honor food preferences as able    Risk Level: High [   ] Moderate [ x  ] Low [   ]

## 2023-08-31 NOTE — PROGRESS NOTE ADULT - SUBJECTIVE AND OBJECTIVE BOX
63M with a past medical history of chronic back pain with concerns for L4/L5 osteomyelitis, alcohol abuse without withdrawal history who was brought in to [facility] after being found sleeping on the ground intoxicated, admitted for further management of possible osteomyelitis and hydronephrosis. S/p attempted unsuccessful L URS on 8/21/23. Distal-mid ureter noted to be entirely obstructed vs obliterated with inability to pass wire or contrast retrograde beyond area of obstruction. Urology following, recommending nephrostomy placement. IR consulted for left PCN placed 8/22/23.    L PCN with fruit punch colored output, 40ml. Dressing c/d/i.     IR will continue to follow. Please continue recording drain outputs.

## 2023-08-31 NOTE — PROGRESS NOTE ADULT - ASSESSMENT
Mr. Healy is a 63 year old male with a past medical history of chronic back pain with concerns for L4/L5 osteomyelitis, alcohol use without withdrawal history who was brought in to [facility] after being found sleeping on the ground intoxicated, admitted for further management of possible osteomyelitis (deemed stable, will f/u outpatient) and hydronephrosis (s/p L PCN placement) currently pending MOSHE placement

## 2023-08-31 NOTE — PROGRESS NOTE ADULT - PROBLEM SELECTOR PLAN 5
Diagnosed in April on Synthroid 150 mcg daily, frequently refusing medication  - TSH elevated (37 on 8/29), i/s/o frequent medication refusal, discussed medications & tx objectives w pt, will continue to encourage medication adherence  - endocrine met with pt & discussed future rx administration for optimal medication function  - otherwise continue home synthroid as prescribed

## 2023-08-31 NOTE — PROGRESS NOTE ADULT - SUBJECTIVE AND OBJECTIVE BOX
**INCOMPLETE NOTE    OVERNIGHT EVENTS: None    SUBJECTIVE:  Patient seen and examined at bedside, comfortable, NAD. Denied fever, chest pain, dyspnea, abdominal pain.     Vital Signs Last 12 Hrs  T(F): 97.6 (08-31-23 @ 05:33), Max: 98 (08-30-23 @ 20:37)  HR: 93 (08-31-23 @ 05:33) (91 - 93)  BP: 106/76 (08-31-23 @ 05:33) (106/76 - 122/80)  BP(mean): --  RR: 18 (08-31-23 @ 05:33) (18 - 18)  SpO2: 98% (08-31-23 @ 05:33) (98% - 98%)  I&O's Summary    29 Aug 2023 07:01  -  30 Aug 2023 07:00  --------------------------------------------------------  IN: 0 mL / OUT: 30 mL / NET: -30 mL        PHYSICAL EXAM:  Constitutional: NAD, comfortable in bed.  HEENT: NC/AT, PERRLA, EOMI, no conjunctival pallor or scleral icterus, MMM  Neck: Supple, no JVD  Respiratory: CTA B/L. No w/r/r.   Cardiovascular: RRR, normal S1 and S2, no m/r/g.   Gastrointestinal: +BS, soft NTND, no guarding or rebound tenderness, no palpable masses   Extremities: wwp; no cyanosis, clubbing or edema.   Vascular: Pulses equal and strong throughout.   Neurological: AAOx3, no CN deficits, strength and sensation intact throughout.   Skin: No gross skin abnormalities or rashes        LABS:                        14.2   5.94  )-----------( 312      ( 30 Aug 2023 06:37 )             44.4     08-30    132<L>  |  100  |  19  ----------------------------<  114<H>  3.9   |  26  |  1.63<H>    Ca    8.7      30 Aug 2023 06:37  Phos  2.8     08-30  Mg     2.1     08-30    TPro  6.2  /  Alb  3.3  /  TBili  0.2  /  DBili  x   /  AST  13  /  ALT  10  /  AlkPhos  65  08-30      Urinalysis Basic - ( 30 Aug 2023 06:37 )    Color: x / Appearance: x / SG: x / pH: x  Gluc: 114 mg/dL / Ketone: x  / Bili: x / Urobili: x   Blood: x / Protein: x / Nitrite: x   Leuk Esterase: x / RBC: x / WBC x   Sq Epi: x / Non Sq Epi: x / Bacteria: x          RADIOLOGY & ADDITIONAL TESTS:    MEDICATIONS  (STANDING):  acetaminophen     Tablet .. 975 milliGRAM(s) Oral every 8 hours  dextrose 5% + sodium chloride 0.9%. 1000 milliLiter(s) (100 mL/Hr) IV Continuous <Continuous>  enoxaparin Injectable 40 milliGRAM(s) SubCutaneous every 24 hours  levothyroxine 150 MICROGram(s) Oral daily  lidocaine   4% Patch 1 Patch Transdermal every 24 hours  melatonin 3 milliGRAM(s) Oral at bedtime  polyethylene glycol 3350 17 Gram(s) Oral two times a day  senna 2 Tablet(s) Oral at bedtime    MEDICATIONS  (PRN):  ondansetron    Tablet 4 milliGRAM(s) Oral every 8 hours PRN Nausea and/or Vomiting  oxyCODONE    IR 2.5 milliGRAM(s) Oral every 6 hours PRN moderate and severe pain   **INCOMPLETE NOTE    OVERNIGHT EVENTS: None    SUBJECTIVE:  Patient seen and examined at bedside, comfortable, NAD. Denied fever, chest pain, dyspnea, abdominal pain.     Vital Signs Last 12 Hrs  T(F): 97.9 (08-31-23 @ 20:33), Max: 98 (08-31-23 @ 15:38)  HR: 99 (08-31-23 @ 20:33) (94 - 99)  BP: 95/69 (08-31-23 @ 20:33) (95/69 - 99/66)  BP(mean): --  RR: 17 (08-31-23 @ 20:33) (17 - 18)  SpO2: 95% (08-31-23 @ 20:33) (95% - 97%)  I&O's Summary    30 Aug 2023 07:01  -  31 Aug 2023 07:00  --------------------------------------------------------  IN: 0 mL / OUT: 40 mL / NET: -40 mL        PHYSICAL EXAM:  Constitutional: NAD, comfortable in bed.  HEENT: NC/AT, PERRLA, EOMI, no conjunctival pallor or scleral icterus, MMM  Neck: Supple, no JVD  Respiratory: CTA B/L. No w/r/r.   Cardiovascular: RRR, normal S1 and S2, no m/r/g.   Gastrointestinal: +BS, soft NTND, no guarding or rebound tenderness, no palpable masses   Extremities: wwp; no cyanosis, clubbing or edema.   Vascular: Pulses equal and strong throughout.   Neurological: AAOx3, no CN deficits, strength and sensation intact throughout.   Skin: No gross skin abnormalities or rashes        LABS:                        13.2   4.68  )-----------( 280      ( 31 Aug 2023 09:40 )             40.8     08-31    138  |  104  |  27<H>  ----------------------------<  96  4.3   |  27  |  1.59<H>    Ca    8.6      31 Aug 2023 09:40  Phos  3.3     08-31  Mg     1.9     08-31    TPro  5.9<L>  /  Alb  3.3  /  TBili  0.2  /  DBili  x   /  AST  11  /  ALT  10  /  AlkPhos  60  08-31      Urinalysis Basic - ( 31 Aug 2023 09:40 )    Color: x / Appearance: x / SG: x / pH: x  Gluc: 96 mg/dL / Ketone: x  / Bili: x / Urobili: x   Blood: x / Protein: x / Nitrite: x   Leuk Esterase: x / RBC: x / WBC x   Sq Epi: x / Non Sq Epi: x / Bacteria: x          RADIOLOGY & ADDITIONAL TESTS:    MEDICATIONS  (STANDING):  acetaminophen     Tablet .. 975 milliGRAM(s) Oral every 8 hours  enoxaparin Injectable 40 milliGRAM(s) SubCutaneous every 24 hours  levothyroxine 150 MICROGram(s) Oral daily  lidocaine   4% Patch 1 Patch Transdermal every 24 hours  melatonin 3 milliGRAM(s) Oral at bedtime  polyethylene glycol 3350 17 Gram(s) Oral two times a day  senna 2 Tablet(s) Oral at bedtime    MEDICATIONS  (PRN):  ondansetron    Tablet 4 milliGRAM(s) Oral every 8 hours PRN Nausea and/or Vomiting

## 2023-08-31 NOTE — PROGRESS NOTE ADULT - PROBLEM SELECTOR PLAN 6
Patient with history of intermittent alcohol abuse, frequent falls while using alcohol. Currently p/w 6 alcoholic beverages today, Denies history of withdrawal, DTs, or seizure.     - Continue to monitor symptomatically.    # Protein gap with lytic lesions: Previously worked up with SPEP/UPEP and found to be within normal limits. Patient with history of intermittent alcohol abuse, frequent falls while using alcohol. Currently p/w 6 alcoholic beverages today, Denies history of withdrawal, DTs, or seizure.   - Continue to monitor symptomatically

## 2023-09-01 ENCOUNTER — TRANSCRIPTION ENCOUNTER (OUTPATIENT)
Age: 63
End: 2023-09-01

## 2023-09-01 LAB
ALBUMIN SERPL ELPH-MCNC: 3.2 G/DL — LOW (ref 3.3–5)
ALP SERPL-CCNC: 62 U/L — SIGNIFICANT CHANGE UP (ref 40–120)
ALT FLD-CCNC: 8 U/L — LOW (ref 10–45)
ANION GAP SERPL CALC-SCNC: 7 MMOL/L — SIGNIFICANT CHANGE UP (ref 5–17)
AST SERPL-CCNC: 12 U/L — SIGNIFICANT CHANGE UP (ref 10–40)
BASOPHILS # BLD AUTO: 0.07 K/UL — SIGNIFICANT CHANGE UP (ref 0–0.2)
BASOPHILS NFR BLD AUTO: 1.4 % — SIGNIFICANT CHANGE UP (ref 0–2)
BILIRUB SERPL-MCNC: 0.2 MG/DL — SIGNIFICANT CHANGE UP (ref 0.2–1.2)
BUN SERPL-MCNC: 32 MG/DL — HIGH (ref 7–23)
CALCIUM SERPL-MCNC: 8.8 MG/DL — SIGNIFICANT CHANGE UP (ref 8.4–10.5)
CHLORIDE SERPL-SCNC: 104 MMOL/L — SIGNIFICANT CHANGE UP (ref 96–108)
CO2 SERPL-SCNC: 25 MMOL/L — SIGNIFICANT CHANGE UP (ref 22–31)
CREAT SERPL-MCNC: 1.72 MG/DL — HIGH (ref 0.5–1.3)
EGFR: 44 ML/MIN/1.73M2 — LOW
EOSINOPHIL # BLD AUTO: 0.25 K/UL — SIGNIFICANT CHANGE UP (ref 0–0.5)
EOSINOPHIL NFR BLD AUTO: 5 % — SIGNIFICANT CHANGE UP (ref 0–6)
GLUCOSE SERPL-MCNC: 92 MG/DL — SIGNIFICANT CHANGE UP (ref 70–99)
HCT VFR BLD CALC: 41.5 % — SIGNIFICANT CHANGE UP (ref 39–50)
HGB BLD-MCNC: 13.1 G/DL — SIGNIFICANT CHANGE UP (ref 13–17)
IMM GRANULOCYTES NFR BLD AUTO: 0.4 % — SIGNIFICANT CHANGE UP (ref 0–0.9)
LYMPHOCYTES # BLD AUTO: 1.91 K/UL — SIGNIFICANT CHANGE UP (ref 1–3.3)
LYMPHOCYTES # BLD AUTO: 38.4 % — SIGNIFICANT CHANGE UP (ref 13–44)
MAGNESIUM SERPL-MCNC: 2 MG/DL — SIGNIFICANT CHANGE UP (ref 1.6–2.6)
MCHC RBC-ENTMCNC: 27.2 PG — SIGNIFICANT CHANGE UP (ref 27–34)
MCHC RBC-ENTMCNC: 31.6 GM/DL — LOW (ref 32–36)
MCV RBC AUTO: 86.3 FL — SIGNIFICANT CHANGE UP (ref 80–100)
MONOCYTES # BLD AUTO: 0.58 K/UL — SIGNIFICANT CHANGE UP (ref 0–0.9)
MONOCYTES NFR BLD AUTO: 11.7 % — SIGNIFICANT CHANGE UP (ref 2–14)
NEUTROPHILS # BLD AUTO: 2.14 K/UL — SIGNIFICANT CHANGE UP (ref 1.8–7.4)
NEUTROPHILS NFR BLD AUTO: 43.1 % — SIGNIFICANT CHANGE UP (ref 43–77)
NRBC # BLD: 0 /100 WBCS — SIGNIFICANT CHANGE UP (ref 0–0)
PHOSPHATE SERPL-MCNC: 2.8 MG/DL — SIGNIFICANT CHANGE UP (ref 2.5–4.5)
PLATELET # BLD AUTO: 236 K/UL — SIGNIFICANT CHANGE UP (ref 150–400)
POTASSIUM SERPL-MCNC: 4.8 MMOL/L — SIGNIFICANT CHANGE UP (ref 3.5–5.3)
POTASSIUM SERPL-SCNC: 4.8 MMOL/L — SIGNIFICANT CHANGE UP (ref 3.5–5.3)
PROT SERPL-MCNC: 6.2 G/DL — SIGNIFICANT CHANGE UP (ref 6–8.3)
RBC # BLD: 4.81 M/UL — SIGNIFICANT CHANGE UP (ref 4.2–5.8)
RBC # FLD: 16.8 % — HIGH (ref 10.3–14.5)
SODIUM SERPL-SCNC: 136 MMOL/L — SIGNIFICANT CHANGE UP (ref 135–145)
WBC # BLD: 4.97 K/UL — SIGNIFICANT CHANGE UP (ref 3.8–10.5)
WBC # FLD AUTO: 4.97 K/UL — SIGNIFICANT CHANGE UP (ref 3.8–10.5)

## 2023-09-01 PROCEDURE — 99231 SBSQ HOSP IP/OBS SF/LOW 25: CPT | Mod: GC

## 2023-09-01 RX ORDER — GABAPENTIN 400 MG/1
200 CAPSULE ORAL THREE TIMES A DAY
Refills: 0 | Status: DISCONTINUED | OUTPATIENT
Start: 2023-09-01 | End: 2023-09-02

## 2023-09-01 RX ORDER — LIDOCAINE 4 G/100G
1 CREAM TOPICAL
Qty: 0 | Refills: 0 | DISCHARGE
Start: 2023-09-01

## 2023-09-01 RX ORDER — FOLIC ACID 0.8 MG
1 TABLET ORAL DAILY
Refills: 0 | Status: DISCONTINUED | OUTPATIENT
Start: 2023-09-01 | End: 2023-09-02

## 2023-09-01 RX ORDER — LANOLIN ALCOHOL/MO/W.PET/CERES
1 CREAM (GRAM) TOPICAL
Qty: 0 | Refills: 0 | DISCHARGE
Start: 2023-09-01

## 2023-09-01 RX ORDER — THIAMINE MONONITRATE (VIT B1) 100 MG
100 TABLET ORAL DAILY
Refills: 0 | Status: DISCONTINUED | OUTPATIENT
Start: 2023-09-01 | End: 2023-09-02

## 2023-09-01 RX ORDER — TRAMADOL HYDROCHLORIDE 50 MG/1
0.5 TABLET ORAL
Refills: 0 | DISCHARGE

## 2023-09-01 RX ORDER — ACETAMINOPHEN 500 MG
3 TABLET ORAL
Qty: 0 | Refills: 0 | DISCHARGE
Start: 2023-09-01

## 2023-09-01 RX ORDER — ONDANSETRON 8 MG/1
1 TABLET, FILM COATED ORAL
Qty: 0 | Refills: 0 | DISCHARGE
Start: 2023-09-01

## 2023-09-01 RX ORDER — GABAPENTIN 400 MG/1
2 CAPSULE ORAL
Qty: 0 | Refills: 0 | DISCHARGE
Start: 2023-09-01

## 2023-09-01 RX ORDER — FOLIC ACID 0.8 MG
1 TABLET ORAL
Qty: 0 | Refills: 0 | DISCHARGE
Start: 2023-09-01

## 2023-09-01 RX ORDER — GABAPENTIN 400 MG/1
2 CAPSULE ORAL
Refills: 0 | DISCHARGE

## 2023-09-01 RX ORDER — THIAMINE MONONITRATE (VIT B1) 100 MG
1 TABLET ORAL
Qty: 0 | Refills: 0 | DISCHARGE
Start: 2023-09-01

## 2023-09-01 RX ADMIN — Medication 975 MILLIGRAM(S): at 14:14

## 2023-09-01 RX ADMIN — SENNA PLUS 2 TABLET(S): 8.6 TABLET ORAL at 22:39

## 2023-09-01 RX ADMIN — Medication 100 MILLIGRAM(S): at 11:35

## 2023-09-01 RX ADMIN — Medication 3 MILLIGRAM(S): at 22:39

## 2023-09-01 RX ADMIN — Medication 150 MICROGRAM(S): at 05:59

## 2023-09-01 RX ADMIN — Medication 1 MILLIGRAM(S): at 11:35

## 2023-09-01 RX ADMIN — Medication 975 MILLIGRAM(S): at 22:39

## 2023-09-01 RX ADMIN — GABAPENTIN 200 MILLIGRAM(S): 400 CAPSULE ORAL at 14:14

## 2023-09-01 RX ADMIN — Medication 975 MILLIGRAM(S): at 15:14

## 2023-09-01 RX ADMIN — GABAPENTIN 200 MILLIGRAM(S): 400 CAPSULE ORAL at 22:39

## 2023-09-01 NOTE — PROGRESS NOTE ADULT - ATTENDING COMMENTS
63 YOM with PMH of CKD, hypothyroidism, chronic LBP, AUD who presented for alcohol intoxication and acute on chronic low back pain, admitted for further evaluation of chronic left hydronephrosis now s/p PCN.     CHITO on CKD: improved from 1.7 --> 1.5 after IVFH, CTM, encourage PO    Nausea/vomiting, constipation- Resolved, tolerating PO. Continue bowel regimen.     started after PCN, no signs of obstruction on imaging. Had several bowel movements yesterday with improved symptoms. Tolerating PO. Cont bowel regimen.    Acute on chronic LBP - being evaluated outpatient for chronic OM vs discitis vs degenerative disease, bone biopsy negative and pending Bone-Gallium SPECT CT, afebrile without leukocytosis, monitor off antibiotics and outpatient follow up with ID. Pain better controlled.     Severe L hydronephrosis - ureteroscopy attempted 8/21 but unable to pass wire past obstruction so now patient s/p PCN with IR 8/22. Will need continued outpatient follow up with urology    Hypothyroidism - appreciate endo recs, will need outpatient follow up (patient intermittently refuses meds).    Dispo - [facility] to return to [facility] with [facility] and PT rec [facility], has acceptance and is pending authorization. Medically stable for discharge.
63 YOM with PMH of CKD, hypothyroidism, chronic LBP, AUD who presented for alcohol intoxication and acute on chronic low back pain, admitted for further evaluation of chronic left hydronephrosis now s/p PCN.     Nausea/vomiting - started after PCN, feels cramping but having normal BM. Did not respond to PO or IV ondansetron, will try metoclopramide today (monitor Qtc with ECG). Poor PO tolerance, will hydrate with IVF. Given two days of emesis with poor PO, obtain CTAP.    Acute on chronic LBP - being evaluated outpatient for chronic OM vs discitis vs degenerative disease, bone biopsy negative and pending Bone-Gallium SPECT CT, afebrile without leukocytosis, monitor off antibiotics and outpatient follow up with ID. Pain better controlled.     Severe L hydronephrosis - ureteroscopy attempted 8/21 but unable to pass wire past obstruction so now patient s/p PCN with IR 8/22. Continues to have punch colored output from drain but I/O not being documented - discussed with nursing. Creatinine down to 1.2. Will need continued outpatient follow up with urology    Hypothyroidism - appreciate endo recs    Dispo - unable to return to [facility] with nephrostomy and PT rec [facility], has acceptance and is pending authorization and PO tolerance
63 YOM with PMH of CKD, hypothyroidism, chronic LBP, AUD who presented for alcohol intoxication and acute on chronic low back pain, admitted for further evaluation of chronic left hydronephrosis now s/p PCN.     Nausea/vomiting - started after PCN, feels cramping but having normal BM. Less emesis overnight but persistent nausea despite switching ondansetron from PO to IV. Will trial metoclopramide today. Encourage PO hydration.     Acute on chronic LBP - being evaluated outpatient for chronic OM vs discitis vs degenerative disease, bone biopsy negative and pending Bone-Gallium SPECT CT, afebrile without leukocytosis, monitor off antibiotics and outpatient follow up with ID. Pain better controlled.     Severe L hydronephrosis - ureteroscopy attempted 8/21 but unable to pass wire past obstruction so now patient s/p PCN with IR 8/22, continued outpatient follow up with urology    Hypothyroidism - appreciate endo recs    Dispo - unable to return to [facility] with nephrostomy and PT rec [facility], has acceptance and is pending authorization
63 YOM with PMH of CKD, hypothyroidism, chronic LBP, AUD who presented for alcohol intoxication and acute on chronic low back pain, admitted for further evaluation of chronic left hydronephrosis.     Nausea/vomiting - several episodes NBNB emesis overnight and this morning, denies abdominal pain, diarrhea, or constipation (had BM today). Abd soft, NT, ND, no rebound or guarding. May be secondary to pain - will give anti-emetic IV and increase pain regimen. Obtain KUB.    Acute on chronic LBP - being evaluated outpatient for chronic OM vs discitis vs degenerative disease, bone biopsy negative and pending Bone-Gallium SPECT CT, afebrile without leukocytosis, monitor off antibiotics and outpatient follow up with ID. Cont with pain control - will increase regimen due to persistent uncontrolled pain.     Severe L hydronephrosis - initially noted 4/2023, ureteroscopy attempted 8/21 but unable to pass wire past obstruction so now patient s/p PCN with IR 8/22, continued outpatient follow up with urology    Hypothyroidism - TSH 26 and Collin T4 0.744, suspect is not taking levothyroxine, endocrine consulted, appreciate recs, cont 150mcg daily here and reduce to 125mcg on discharge to [facility] with instructions to give before breakfast    Alcohol use disorder - no s/s withdrawal (not daily drinker)    CKD - renally dose meds, avoid nephrotoxins, baseline 1.6-1.9    Dispo - unable to return to [facility] with nephrostomy and PT rec [facility], pending placement.
63 YOM with PMH of CKD, hypothyroidism, chronic LBP, alcohol use disorder who presented for alcohol intoxication and acute on chronic low back pain, admitted for further evaluation of chronic left hydronephrosis.     Acute on chronic LBP - being evaluated outpatient for chronic OM vs discitis vs degenerative disease, bone biopsy negative and pending Bone-Gallium SPECT CT, afebrile without leukocytosis, monitor off antibiotics and outpatient follow up with ID. Cont with pain control, PT eval.    Severe L hydronephrosis - initially noted 4/2023, missed outpatient L ureteroscopy (was admitted at OSH for CP), procedure was re-scheduled for today - discuss with urology.     Hypothyroidism - TSH 26, suspect is not taking levothyroxine but will check T4 and consult endocrine.    Alcohol use disorder - monitor for signs of withdrawal (does not drink daily)    Hypokalemia - replete PRN    CKD - renally dose meds, avoid nephrotoxins
63 YOM with PMH of CKD, hypothyroidism, chronic LBP, alcohol use disorder who presented for alcohol intoxication and acute on chronic low back pain, admitted for further evaluation of chronic left hydronephrosis.     Acute on chronic LBP - being evaluated outpatient for chronic OM vs discitis vs degenerative disease, bone biopsy negative and pending Bone-Gallium SPECT CT, afebrile without leukocytosis, monitor off antibiotics and outpatient follow up with ID. Cont with pain control, PT eval.    Severe L hydronephrosis - initially noted 4/2023, ureteroscopy attempted 8/21 but unable to pass wire past obstruction so now patient s/p PCN with IR today 8/22. Will need continued outpatient follow up with urology.    Hypothyroidism - TSH 26 and Collin T4 0.744, suspect is not taking levothyroxine, endocrine consulted regarding if need for medication adjustment    Alcohol use disorder - no s/s withdrawal (not daily drinker)    CKD - renally dose meds, avoid nephrotoxins, baseline 1.6-1.9    Dispo - pending PT eval but [facility], will need [facility] placement.
63 YOM with PMH of CKD, hypothyroidism, chronic LBP, AUD who presented for alcohol intoxication and acute on chronic low back pain, admitted for further evaluation of chronic left hydronephrosis /p PCN.     CHITO on CKD: resolved  Constipation - cont bowel regimen.   Chronic LBP - being evaluated outpatient for chronic OM vs discitis vs degenerative disease, bone biopsy negative and pending Bone-Gallium SPECT CT, outpatient follow up with ID. Pain resolved.  Severe L hydronephrosis - s/p PCN with IR 8/22. Will need continued outpatient follow up with urology.  Hypothyroidism - patient intermittently refuses medications, appreciate endo recs, will need outpatient follow up.    Dispo - [facility] pending authorization, medically stable for discharge .
63 YOM with PMH of CKD, hypothyroidism, chronic LBP, AUD who presented for alcohol intoxication and acute on chronic low back pain, admitted for further evaluation of chronic left hydronephrosis /p PCN.     CHITO on CKD: resolved  Constipation - cont bowel regimen.   Chronic LBP - being evaluated outpatient for chronic OM vs discitis vs degenerative disease, bone biopsy negative and pending Bone-Gallium SPECT CT, outpatient follow up with ID. Pain resolved.  Severe L hydronephrosis - s/p PCN with IR 8/22. Will need continued outpatient follow up with urology.  Hypothyroidism - patient intermittently refuses medications, appreciate endo recs, will need outpatient follow up.    Dispo - [facility] pending authorization, medically stable for discharge.
63 YOM with PMH of CKD, hypothyroidism, chronic LBP, AUD who presented for alcohol intoxication and acute on chronic low back pain, admitted for further evaluation of chronic left hydronephrosis now s/p PCN.     CHITO on CKD: Creat 1.7-2 --> 1.2 --> 1.69 today. Suspect pre-renal in setting of poor PO intake over last few days, will give IVF bolus and repeat BMP. Obtain UA and urine lytes.    Nausea/vomiting, constipation- started after PCN, no sigsn of obstruction on imaging. Had several bowel movements yesterday with improved symptoms. Tolerating PO. Cont bowel regimen.    Acute on chronic LBP - being evaluated outpatient for chronic OM vs discitis vs degenerative disease, bone biopsy negative and pending Bone-Gallium SPECT CT, afebrile without leukocytosis, monitor off antibiotics and outpatient follow up with ID. Pain better controlled.     Severe L hydronephrosis - ureteroscopy attempted 8/21 but unable to pass wire past obstruction so now patient s/p PCN with IR 8/22. Will need continued outpatient follow up with urology    Hypothyroidism - appreciate endo recs    Dispo - unable to return to [facility] with nephrostomy and PT rec [facility], has acceptance and is pending authorization.
Patient was seen and examined at bedside on 8/26/2023 at 11 am. Patient reports that he continues to feels nausea and reports continued vomiting. Denies SOB, CP, dizziness, abdominal pain. ROS is otherwise negative. Vitals, labwork and pertinent imaging reviewed. Physical exam - NAD, AAO x 4, PERRLA, EOMI, supple neck, chest - CTA b/l, CV - rrr, s1s2, no m/r/g, abd - soft, + BS, ext - WWP, psych - normal affect, skin - no rash, back - midline, L nephrostomy     Plan  -CT A/P, will consider GI consult if symptoms continue  -IR following
63 YOM with PMH of CKD, hypothyroidism, chronic LBP, AUD who presented for alcohol intoxication and acute on chronic low back pain, admitted for further evaluation of chronic left hydronephrosis now s/p PCN.     CHITO on CKD: resolved    Constipation - cont bowel regimen.     Chronic LBP - being evaluated outpatient for chronic OM vs discitis vs degenerative disease, bone biopsy negative and pending Bone-Gallium SPECT CT, afebrile without leukocytosis, monitor off antibiotics and outpatient follow up with ID. Pain resolved.    Severe L hydronephrosis - ureteroscopy attempted 8/21 but unable to pass wire past obstruction s/p PCN with IR 8/22. Will need continued outpatient follow up with urology.    Hypothyroidism - patient intermittently refuses medications, appreciate endo recs, will need outpatient follow up.    Dispo - [facility] pending authorization, medically stable for discharge

## 2023-09-01 NOTE — PROGRESS NOTE ADULT - PROBLEM SELECTOR PLAN 4
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 Patient underwent biopsy that did not yield bacterial data and refused discharge to [facility] for IV antibiotics while this was worked up. Patient with consistently low ESR/CRP/leukocytosis and no fevers.   Repeat MR lumbar also showing OM vs discitis, though no progression of disease. History of back injections for pain management. Planned for dual isotope bone/gallium scan with SPECT CT with ID for differentiation.   Bx Culture: Gram positive cocci in clusters, methicillin-resistant staph epi  - follow up outpatient for dual isotope bone/gallium scan with SPECT CT and work up for back pain.   - Would hold antibiotics at this time given prolonged duration off of antibiotic therapy without progression  - Continue home gabapentin dose  - pain adequately controlled

## 2023-09-01 NOTE — PROGRESS NOTE ADULT - ASSESSMENT
Mr. Healy is a 63 year old male with a past medical history of chronic back pain with concerns for L4/L5 osteomyelitis, alcohol use without withdrawal history who was brought in to [facility] after being found sleeping on the ground intoxicated, admitted for further management of possible osteomyelitis (deemed stable, will f/u outpatient) and hydronephrosis (s/p L PCN placement) currently pending [facility] placement

## 2023-09-01 NOTE — DISCHARGE NOTE NURSING/CASE MANAGEMENT/SOCIAL WORK - NSDCFUADDAPPT_GEN_ALL_CORE_FT
Please follow up with your Infectious disease specialist  on 08/29/2023.     Please follow up with your Nephrologist Dr. Aldana    Urology will call you to follow up with an appointment.

## 2023-09-01 NOTE — DISCHARGE NOTE NURSING/CASE MANAGEMENT/SOCIAL WORK - PATIENT PORTAL LINK FT
You can access the FollowMyHealth Patient Portal offered by [facility] by registering at the following website: [email or web address]. By joining [facility] [facility]’s FollowMyHealth portal, you will also be able to view your health information using other applications (apps) compatible with our system.

## 2023-09-01 NOTE — PROGRESS NOTE ADULT - PROBLEM SELECTOR PLAN 6
Patient with history of intermittent alcohol abuse, frequent falls while using alcohol. Currently p/w 6 alcoholic beverages today, Denies history of withdrawal, DTs, or seizure.   - Continue to monitor symptomatically

## 2023-09-01 NOTE — PROGRESS NOTE ADULT - SUBJECTIVE AND OBJECTIVE BOX
63M with a past medical history of chronic back pain with concerns for L4/L5 osteomyelitis, alcohol abuse without withdrawal history who was brought in to [facility] after being found sleeping on the ground intoxicated, admitted for further management of possible osteomyelitis and hydronephrosis. S/p attempted unsuccessful L URS on 8/21/23. Distal-mid ureter noted to be entirely obstructed vs obliterated with inability to pass wire or contrast retrograde beyond area of obstruction. Urology following, recommending nephrostomy placement. IR consulted for left PCN placed 8/22/23.    L PCN with fruit punch colored output, 10ml output over 24hr period. Dressing c/d/i.     IR will continue to follow. Please continue recording drain outputs.

## 2023-09-01 NOTE — PROGRESS NOTE ADULT - SUBJECTIVE AND OBJECTIVE BOX
INCOMPLETE NOTE    OVERNIGHT EVENTS: None    SUBJECTIVE:  Patient seen and examined at bedside, comfortable, NAD. Denied fever, chest pain, dyspnea, abdominal pain.     Vital Signs Last 12 Hrs  T(F): 98 (09-01-23 @ 05:49), Max: 98 (09-01-23 @ 05:49)  HR: 86 (09-01-23 @ 05:49) (86 - 86)  BP: 106/71 (09-01-23 @ 05:49) (106/71 - 106/71)  BP(mean): --  RR: 18 (09-01-23 @ 05:49) (18 - 18)  SpO2: 100% (09-01-23 @ 05:49) (100% - 100%)  I&O's Summary    31 Aug 2023 07:01  -  01 Sep 2023 07:00  --------------------------------------------------------  IN: 0 mL / OUT: 10 mL / NET: -10 mL        PHYSICAL EXAM:  Constitutional: NAD, comfortable in bed.  HEENT: NC/AT, PERRLA, EOMI, no conjunctival pallor or scleral icterus, MMM  Neck: Supple, no JVD  Respiratory: CTA B/L. No w/r/r.   Cardiovascular: RRR, normal S1 and S2, no m/r/g.   Gastrointestinal: +BS, soft NTND, no guarding or rebound tenderness, no palpable masses   Extremities: wwp; no cyanosis, clubbing or edema.   Vascular: Pulses equal and strong throughout.   Neurological: AAOx3, no CN deficits, strength and sensation intact throughout.   Skin: No gross skin abnormalities or rashes        LABS:                        13.2   4.68  )-----------( 280      ( 31 Aug 2023 09:40 )             40.8     08-31    138  |  104  |  27<H>  ----------------------------<  96  4.3   |  27  |  1.59<H>    Ca    8.6      31 Aug 2023 09:40  Phos  3.3     08-31  Mg     1.9     08-31    TPro  5.9<L>  /  Alb  3.3  /  TBili  0.2  /  DBili  x   /  AST  11  /  ALT  10  /  AlkPhos  60  08-31      Urinalysis Basic - ( 31 Aug 2023 09:40 )    Color: x / Appearance: x / SG: x / pH: x  Gluc: 96 mg/dL / Ketone: x  / Bili: x / Urobili: x   Blood: x / Protein: x / Nitrite: x   Leuk Esterase: x / RBC: x / WBC x   Sq Epi: x / Non Sq Epi: x / Bacteria: x          RADIOLOGY & ADDITIONAL TESTS:    MEDICATIONS  (STANDING):  acetaminophen     Tablet .. 975 milliGRAM(s) Oral every 8 hours  enoxaparin Injectable 40 milliGRAM(s) SubCutaneous every 24 hours  levothyroxine 150 MICROGram(s) Oral daily  lidocaine   4% Patch 1 Patch Transdermal every 24 hours  melatonin 3 milliGRAM(s) Oral at bedtime  polyethylene glycol 3350 17 Gram(s) Oral two times a day  senna 2 Tablet(s) Oral at bedtime    MEDICATIONS  (PRN):  ondansetron    Tablet 4 milliGRAM(s) Oral every 8 hours PRN Nausea and/or Vomiting

## 2023-09-02 VITALS
OXYGEN SATURATION: 100 % | TEMPERATURE: 98 F | SYSTOLIC BLOOD PRESSURE: 100 MMHG | RESPIRATION RATE: 17 BRPM | DIASTOLIC BLOOD PRESSURE: 65 MMHG | HEART RATE: 82 BPM

## 2023-09-02 PROCEDURE — 99231 SBSQ HOSP IP/OBS SF/LOW 25: CPT | Mod: GC

## 2023-09-02 RX ADMIN — Medication 150 MICROGRAM(S): at 06:04

## 2023-09-02 RX ADMIN — GABAPENTIN 200 MILLIGRAM(S): 400 CAPSULE ORAL at 06:03

## 2023-09-02 RX ADMIN — Medication 975 MILLIGRAM(S): at 06:04

## 2023-09-02 NOTE — PROGRESS NOTE ADULT - PROBLEM SELECTOR PROBLEM 8
Discharge planning issues
Prophylactic measure
Prophylactic measure
Discharge planning issues
Discharge planning issues
Prophylactic measure
Prophylactic measure
Discharge planning issues

## 2023-09-02 NOTE — PROGRESS NOTE ADULT - PROBLEM SELECTOR PLAN 8
seen by palliative, recommend standing tylenol for pain & no further recommendations given improving functional status  pt following, recommend d/c to [facility]
seen by palliative, recommend standing tylenol for pain & no further recommendations given improving functional status  pt following, recommend d/c to [facility]
Pending discharge to [facility] (transport for this AM)
F: 2.3L NS.   E: Replete PRN.   N: Regular diet.   DVT ppx: Holding pending possible procedure.   Dispo:
F: 2.3L NS.   E: Replete PRN.   N: Regular diet.   DVT ppx: Holding pending possible procedure.   Dispo:
seen by palliative, recommend standing tylenol for pain & no further recommendations given improving functional status  pt following, recommend d/c to [facility]
F: 2.3L NS.   E: Replete PRN.   N: Regular diet.   DVT ppx: Holding pending possible procedure.   Dispo:
seen by palliative, recommend standing tylenol for pain & no further recommendations given improving functional status  pt following, recommend d/c to [facility]
seen by palliative this am, recommend standing tylenol for pain & no further recommendations given improving functional status, pending pt evaluation  - pt will see pt again & evaluate function status & disposition today, will f/u recommendations
F: 2.3L NS.   E: Replete PRN.   N: Regular diet.   DVT ppx: Holding pending possible procedure.   Dispo:

## 2023-09-02 NOTE — PROGRESS NOTE ADULT - SUBJECTIVE AND OBJECTIVE BOX
Patient was planned for DC to [facility] yesterday but transportation arrived too late. Transportation set up for this morning. Otherwise, NAEON. No headache, fevers, chest pain, SOB, abd pain, N/V/D.     SUBJECTIVE:  Patient was seen and examined at bedside.    Review of systems: No CP, dyspnea, nausea or vomiting, dysuria, LE edema.     Diet, Regular (08-22-23 @ 13:47) [Active]      MEDICATIONS:  MEDICATIONS  (STANDING):  acetaminophen     Tablet .. 975 milliGRAM(s) Oral every 8 hours  enoxaparin Injectable 40 milliGRAM(s) SubCutaneous every 24 hours  folic acid 1 milliGRAM(s) Oral daily  gabapentin 200 milliGRAM(s) Oral three times a day  levothyroxine 150 MICROGram(s) Oral daily  lidocaine   4% Patch 1 Patch Transdermal every 24 hours  melatonin 3 milliGRAM(s) Oral at bedtime  polyethylene glycol 3350 17 Gram(s) Oral two times a day  senna 2 Tablet(s) Oral at bedtime  thiamine 100 milliGRAM(s) Oral daily    MEDICATIONS  (PRN):  ondansetron    Tablet 4 milliGRAM(s) Oral every 8 hours PRN Nausea and/or Vomiting      Allergies    No Known Allergies    Intolerances        OBJECTIVE:  Vital Signs Last 24 Hrs  T(C): 36.6 (02 Sep 2023 04:59), Max: 36.7 (01 Sep 2023 15:35)  T(F): 97.9 (02 Sep 2023 04:59), Max: 98 (01 Sep 2023 15:35)  HR: 82 (02 Sep 2023 04:59) (82 - 93)  BP: 100/65 (02 Sep 2023 04:59) (98/65 - 109/78)  BP(mean): 76 (01 Sep 2023 15:35) (76 - 76)  RR: 17 (02 Sep 2023 04:59) (17 - 18)  SpO2: 100% (02 Sep 2023 04:59) (96% - 100%)    Parameters below as of 01 Sep 2023 22:52  Patient On (Oxygen Delivery Method): room air    I&O's Summary    PHYSICAL EXAM:  Gen: Reclining in bed at time of exam, appears stated age  HEENT: NCAT, MMM, clear OP, poor dentition  Neck: supple, trachea at midline  CV: RRR, +S1/S2  Pulm: adequate respiratory effort, no increase in work of breathing  Abd: soft, ND. L nephrostomy in place  Skin: warm and dry,   Ext: non-tender, no edema, chronic arthritic changes to BL hands  Neuro: AOx3, speaking in full sentences  Psych: affect and behavior appropriate, pleasant at time of interview    LABS:                        13.1   4.97  )-----------( 236      ( 01 Sep 2023 10:45 )             41.5     09-01    136  |  104  |  32<H>  ----------------------------<  92  4.8   |  25  |  1.72<H>    Ca    8.8      01 Sep 2023 10:45  Phos  2.8     09-01  Mg     2.0     09-01    TPro  6.2  /  Alb  3.2<L>  /  TBili  0.2  /  DBili  x   /  AST  12  /  ALT  8<L>  /  AlkPhos  62  09-01    LIVER FUNCTIONS - ( 01 Sep 2023 10:45 )  Alb: 3.2 g/dL / Pro: 6.2 g/dL / ALK PHOS: 62 U/L / ALT: 8 U/L / AST: 12 U/L / GGT: x           CAPILLARY BLOOD GLUCOSE    Urinalysis Basic - ( 01 Sep 2023 10:45 )    Color: x / Appearance: x / SG: x / pH: x  Gluc: 92 mg/dL / Ketone: x  / Bili: x / Urobili: x   Blood: x / Protein: x / Nitrite: x   Leuk Esterase: x / RBC: x / WBC x   Sq Epi: x / Non Sq Epi: x / Bacteria: x    MICRODATA:  Reviewed    RADIOLOGY/OTHER STUDIES:  REviewed

## 2023-09-02 NOTE — PROGRESS NOTE ADULT - REASON FOR ADMISSION
R/o OM, percutaneous nephrostomy tube placement
R/o OM, possible inpatient uretoscopy
R/o OM, possible inpatient uteroscopy
R/o OM, possible inpatient uretoscopy

## 2023-09-02 NOTE — PROGRESS NOTE ADULT - NSPROGADDITIONALINFOA_GEN_ALL_CORE
CHITO on CKD: resolved  Constipation - cont bowel regimen.   Chronic LBP - being evaluated outpatient for chronic OM vs discitis vs degenerative disease, bone biopsy negative and pending Bone-Gallium SPECT CT, outpatient follow up with ID. Pain resolved.  Severe L hydronephrosis - s/p PCN with IR 8/22. Will need continued outpatient follow up with urology.  Hypothyroidism - patient intermittently refuses medications, appreciate endo recs, will need outpatient follow up.

## 2023-09-02 NOTE — PROGRESS NOTE ADULT - PROBLEM SELECTOR PROBLEM 1
Hypothyroidism
Chronic back pain
Chronic back pain
Hydronephrosis, left
Hydronephrosis, left
Nausea & vomiting
Hydronephrosis, left
Constipation
Nausea & vomiting
Hydronephrosis, left
Nausea & vomiting
Nausea & vomiting

## 2023-09-02 NOTE — PROGRESS NOTE ADULT - PROBLEM SELECTOR PROBLEM 3
Chronic back pain
Fall
Nausea & vomiting
Nausea & vomiting
Stage 3 chronic kidney disease
Stage 3 chronic kidney disease
Nausea & vomiting
Stage 3 chronic kidney disease
Stage 3 chronic kidney disease
Fall
Stage 3 chronic kidney disease
Nausea & vomiting

## 2023-09-02 NOTE — PROGRESS NOTE ADULT - ASSESSMENT
63 YOM with PMH of CKD, hypothyroidism, chronic LBP, AUD who presented for alcohol intoxication and acute on chronic low back pain, admitted for further evaluation of chronic left hydronephrosis /p PCN.

## 2023-09-02 NOTE — PROGRESS NOTE ADULT - PROBLEM SELECTOR PROBLEM 2
Hydronephrosis, left
Hydronephrosis, left
Nausea & vomiting
Hydronephrosis, left
Stage 3 chronic kidney disease
Stage 3 chronic kidney disease
Hydronephrosis, left
Stage 3 chronic kidney disease
Hydronephrosis, left
Hydronephrosis, left
Stage 3 chronic kidney disease

## 2023-09-02 NOTE — PROGRESS NOTE ADULT - PROBLEM SELECTOR PLAN 4
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 Patient underwent biopsy that did not yield bacterial data and refused discharge to [facility] for IV antibiotics while this was worked up. Patient with consistently low ESR/CRP/leukocytosis and no fevers.   Repeat MR lumbar also showing OM vs discitis, though no progression of disease. History of back injections for pain management. Planned for dual isotope bone/gallium scan with SPECT CT with ID for differentiation.   Bx Culture: Gram positive cocci in clusters, methicillin-resistant staph epi  - follow up outpatient for dual isotope bone/gallium scan with SPECT CT and work up for back pain.   - Would hold antibiotics at this time given prolonged duration off of antibiotic therapy without progression  - Continue home gabapentin dose  - pain adequately controlled

## 2023-09-02 NOTE — PROGRESS NOTE ADULT - PROBLEM SELECTOR PROBLEM 4
Chronic back pain
Chronic back pain
Hypothyroidism
Chronic back pain
Chronic back pain
Hypothyroidism
Hydronephrosis, left
Chronic back pain

## 2023-09-02 NOTE — PROGRESS NOTE ADULT - PROVIDER SPECIALTY LIST ADULT
Internal Medicine
Intervent Radiology
Urology
Internal Medicine
Intervent Radiology
Intervent Radiology
Internal Medicine
Intervent Radiology
Urology
Endocrinology
Hospitalist
Hospitalist
Internal Medicine
Palliative Care
Internal Medicine
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
## 2023-09-09 DIAGNOSIS — N17.9 ACUTE KIDNEY FAILURE, UNSPECIFIED: ICD-10-CM

## 2023-09-09 DIAGNOSIS — N13.1 HYDRONEPHROSIS WITH URETERAL STRICTURE, NOT ELSEWHERE CLASSIFIED: ICD-10-CM

## 2023-09-09 DIAGNOSIS — Z91.81 HISTORY OF FALLING: ICD-10-CM
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
## 2023-09-09 DIAGNOSIS — M54.50 LOW BACK PAIN, UNSPECIFIED: ICD-10-CM

## 2023-09-09 DIAGNOSIS — F10.129 ALCOHOL ABUSE WITH INTOXICATION, UNSPECIFIED: ICD-10-CM

## 2023-09-09 DIAGNOSIS — G89.29 OTHER CHRONIC PAIN: ICD-10-CM

## 2023-09-09 DIAGNOSIS — E87.6 HYPOKALEMIA: ICD-10-CM

## 2023-09-09 DIAGNOSIS — K59.00 CONSTIPATION, UNSPECIFIED: ICD-10-CM

## 2023-09-09 DIAGNOSIS — E03.9 HYPOTHYROIDISM, UNSPECIFIED: ICD-10-CM

## 2023-09-09 DIAGNOSIS — R29.6 REPEATED FALLS: ICD-10-CM

## 2023-09-09 DIAGNOSIS — N18.30 CHRONIC KIDNEY DISEASE, STAGE 3 UNSPECIFIED: ICD-10-CM

## 2023-09-09 DIAGNOSIS — Z91.148 PATIENT'S OTHER NONCOMPLIANCE WITH MEDICATION REGIMEN FOR OTHER REASON: ICD-10-CM

## 2023-09-11 ENCOUNTER — APPOINTMENT (OUTPATIENT)
Dept: INTERNAL MEDICINE | Facility: CLINIC | Age: 63
End: 2023-09-11

## 2023-09-13 ENCOUNTER — APPOINTMENT (OUTPATIENT)
Dept: INTERNAL MEDICINE | Facility: CLINIC | Age: 63
End: 2023-09-13
Payer: MEDICARE

## 2023-09-13 VITALS
OXYGEN SATURATION: 97 % | DIASTOLIC BLOOD PRESSURE: 83 MMHG | TEMPERATURE: 97.8 F | HEART RATE: 70 BPM | BODY MASS INDEX: 21.56 KG/M2 | WEIGHT: 154 LBS | SYSTOLIC BLOOD PRESSURE: 124 MMHG | HEIGHT: 71 IN

## 2023-09-13 DIAGNOSIS — M54.30 SCIATICA, UNSPECIFIED SIDE: ICD-10-CM

## 2023-09-13 DIAGNOSIS — Z93.6 OTHER ARTIFICIAL OPENINGS OF URINARY TRACT STATUS: ICD-10-CM

## 2023-09-13 DIAGNOSIS — E03.9 HYPOTHYROIDISM, UNSPECIFIED: ICD-10-CM

## 2023-09-13 PROCEDURE — 99214 OFFICE O/P EST MOD 30 MIN: CPT

## 2023-09-13 RX ORDER — GABAPENTIN 400 MG/1
CAPSULE ORAL
Refills: 0 | Status: DISCONTINUED | COMMUNITY
End: 2023-09-13

## 2023-09-13 RX ORDER — LEVOTHYROXINE SODIUM 112 UG/1
112 CAPSULE ORAL
Refills: 0 | Status: DISCONTINUED | COMMUNITY
End: 2023-09-13

## 2023-09-13 RX ORDER — FOLIC ACID 1 MG/1
1 TABLET ORAL DAILY
Refills: 0 | Status: ACTIVE | COMMUNITY
Start: 2023-09-13

## 2023-09-13 RX ORDER — CHROMIUM 200 MCG
TABLET ORAL
Refills: 0 | Status: DISCONTINUED | COMMUNITY
End: 2023-09-13

## 2023-09-13 RX ORDER — LEVOTHYROXINE SODIUM 0.15 MG/1
150 TABLET ORAL DAILY
Refills: 0 | Status: ACTIVE | COMMUNITY
Start: 2023-09-13

## 2023-09-13 RX ORDER — ACETAMINOPHEN 325 MG/1
325 TABLET ORAL
Refills: 0 | Status: ACTIVE | COMMUNITY
Start: 2023-09-13

## 2023-09-13 RX ORDER — GABAPENTIN 300 MG/1
300 CAPSULE ORAL 3 TIMES DAILY
Refills: 0 | Status: ACTIVE | COMMUNITY
Start: 2023-09-13

## 2023-09-13 RX ORDER — TRAMADOL HYDROCHLORIDE 25 MG/1
TABLET, COATED ORAL
Refills: 0 | Status: DISCONTINUED | COMMUNITY
End: 2023-09-13

## 2023-09-19 ENCOUNTER — APPOINTMENT (OUTPATIENT)
Dept: UROLOGY | Facility: CLINIC | Age: 63
End: 2023-09-19

## 2023-09-29 ENCOUNTER — APPOINTMENT (OUTPATIENT)
Dept: UROLOGY | Facility: CLINIC | Age: 63
End: 2023-09-29
Payer: MEDICARE

## 2023-09-29 ENCOUNTER — APPOINTMENT (OUTPATIENT)
Dept: UROLOGY | Facility: CLINIC | Age: 63
End: 2023-09-29

## 2023-09-29 VITALS
HEIGHT: 71 IN | WEIGHT: 154 LBS | HEART RATE: 74 BPM | BODY MASS INDEX: 21.56 KG/M2 | DIASTOLIC BLOOD PRESSURE: 101 MMHG | SYSTOLIC BLOOD PRESSURE: 149 MMHG | TEMPERATURE: 98.2 F

## 2023-09-29 DIAGNOSIS — N13.30 UNSPECIFIED HYDRONEPHROSIS: ICD-10-CM

## 2023-09-29 DIAGNOSIS — N13.5 CROSSING VESSEL AND STRICTURE OF URETER W/OUT HYDRONEPHROSIS: ICD-10-CM

## 2023-09-29 PROCEDURE — 99214 OFFICE O/P EST MOD 30 MIN: CPT

## 2023-10-02 LAB
ANION GAP SERPL CALC-SCNC: 13 MMOL/L
APPEARANCE: CLEAR
APTT BLD: 35 SEC
BACTERIA UR CULT: NORMAL
BACTERIA: NEGATIVE /HPF
BILIRUBIN URINE: NEGATIVE
BLOOD URINE: NEGATIVE
BUN SERPL-MCNC: 28 MG/DL
CALCIUM SERPL-MCNC: 9.3 MG/DL
CAST: 0 /LPF
CHLORIDE SERPL-SCNC: 103 MMOL/L
CO2 SERPL-SCNC: 24 MMOL/L
COLOR: YELLOW
CREAT SERPL-MCNC: 1.58 MG/DL
EGFR: 49 ML/MIN/1.73M2
EPITHELIAL CELLS: 0 /HPF
GLUCOSE QUALITATIVE U: NEGATIVE MG/DL
GLUCOSE SERPL-MCNC: 93 MG/DL
HCT VFR BLD CALC: 40.6 %
HGB BLD-MCNC: 12.7 G/DL
KETONES URINE: NEGATIVE MG/DL
LEUKOCYTE ESTERASE URINE: NEGATIVE
MCHC RBC-ENTMCNC: 27 PG
MCHC RBC-ENTMCNC: 31.3 GM/DL
MCV RBC AUTO: 86.2 FL
MICROSCOPIC-UA: NORMAL
NITRITE URINE: NEGATIVE
PH URINE: 5.5
PLATELET # BLD AUTO: 299 K/UL
POTASSIUM SERPL-SCNC: 5.1 MMOL/L
PROTEIN URINE: NORMAL MG/DL
RBC # BLD: 4.71 M/UL
RBC # FLD: 17.2 %
RED BLOOD CELLS URINE: 0 /HPF
SODIUM SERPL-SCNC: 140 MMOL/L
SPECIFIC GRAVITY URINE: 1.01
UROBILINOGEN URINE: 0.2 MG/DL
WBC # FLD AUTO: 4.57 K/UL
WHITE BLOOD CELLS URINE: 0 /HPF

## 2023-10-20 VITALS
RESPIRATION RATE: 16 BRPM | TEMPERATURE: 98 F | HEIGHT: 71.5 IN | WEIGHT: 16.31 LBS | SYSTOLIC BLOOD PRESSURE: 112 MMHG | HEART RATE: 89 BPM | DIASTOLIC BLOOD PRESSURE: 72 MMHG | OXYGEN SATURATION: 100 %

## 2023-10-20 NOTE — ASU PREOP CHECKLIST - 3.
Spoke to Nat, NURSE MANAGER [phone] @ [facility] regarding time and instructions, Paperwork will be provided DOS

## 2023-10-22 ENCOUNTER — TRANSCRIPTION ENCOUNTER (OUTPATIENT)
Age: 63
End: 2023-10-22

## 2023-10-23 ENCOUNTER — OUTPATIENT (OUTPATIENT)
Dept: OUTPATIENT SERVICES | Facility: HOSPITAL | Age: 63
LOS: 1 days | Discharge: HOME | End: 2023-10-23
Payer: MEDICARE

## 2023-10-23 ENCOUNTER — APPOINTMENT (OUTPATIENT)
Dept: UROLOGY | Facility: HOSPITAL | Age: 63
End: 2023-10-23

## 2023-10-23 ENCOUNTER — TRANSCRIPTION ENCOUNTER (OUTPATIENT)
Age: 63
End: 2023-10-23

## 2023-10-23 VITALS
RESPIRATION RATE: 16 BRPM | HEART RATE: 62 BPM | OXYGEN SATURATION: 100 % | DIASTOLIC BLOOD PRESSURE: 67 MMHG | TEMPERATURE: 97 F | SYSTOLIC BLOOD PRESSURE: 139 MMHG

## 2023-10-23 DIAGNOSIS — Z98.890 OTHER SPECIFIED POSTPROCEDURAL STATES: Chronic | ICD-10-CM

## 2023-10-23 PROCEDURE — 76000 FLUOROSCOPY <1 HR PHYS/QHP: CPT

## 2023-10-23 PROCEDURE — C1758: CPT

## 2023-10-23 PROCEDURE — 74420 UROGRAPHY RTRGR +-KUB: CPT | Mod: 26,LT

## 2023-10-23 PROCEDURE — 50436 DILAT XST TRC NDURLGC PX: CPT | Mod: XU

## 2023-10-23 PROCEDURE — C1769: CPT

## 2023-10-23 PROCEDURE — 50435 EXCHANGE NEPHROSTOMY CATH: CPT | Mod: LT

## 2023-10-23 PROCEDURE — 50080 PERQ NL/PL LITHOTRP SMPL<2CM: CPT | Mod: LT

## 2023-10-23 PROCEDURE — 52351 CYSTOURETERO & OR PYELOSCOPE: CPT | Mod: LT

## 2023-10-23 PROCEDURE — C1729: CPT

## 2023-10-23 PROCEDURE — C1889: CPT

## 2023-10-23 DEVICE — CATH PCNL NC/8LL NC1008: Type: IMPLANTABLE DEVICE | Status: FUNCTIONAL

## 2023-10-23 DEVICE — GUIDEWIRE SENSOR DUAL-FLEX NITINOL STRAIGHT .035" X 150CM: Type: IMPLANTABLE DEVICE | Status: FUNCTIONAL

## 2023-10-23 DEVICE — URETERAL SHEATH NAVIGATOR HD 12/14FR X 36CM: Type: IMPLANTABLE DEVICE | Status: FUNCTIONAL

## 2023-10-23 DEVICE — URETERAL CATH DUAL LUMEN 10FR 54CM: Type: IMPLANTABLE DEVICE | Status: FUNCTIONAL

## 2023-10-23 RX ORDER — PHENAZOPYRIDINE HCL 100 MG
200 TABLET ORAL ONCE
Refills: 0 | Status: COMPLETED | OUTPATIENT
Start: 2023-10-23 | End: 2023-10-23

## 2023-10-23 RX ORDER — PHENAZOPYRIDINE HCL 100 MG
200 TABLET ORAL ONCE
Refills: 0 | Status: COMPLETED | OUTPATIENT
Start: 2023-10-23 | End: 2024-09-20

## 2023-10-23 RX ADMIN — Medication 200 MILLIGRAM(S): at 18:19

## 2023-10-23 NOTE — BRIEF OPERATIVE NOTE - OPERATION/FINDINGS
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 L PCN exchanged. No complications. See dictation.

## 2023-10-23 NOTE — H&P ADULT - NSHPREVIEWOFSYSTEMS_GEN_ALL_CORE
CONSTITUTIONAL: No fever, chills, or weakness.   EYES/ENT: No visual changes;  No ear pain, runny nose, or sore throat.   NECK: No pain or stiffness.  RESPIRATORY: No cough, wheezing, hemoptysis; No shortness of breath.  CARDIOVASCULAR: No chest pain, dyspnea on exertion, or palpitations.  GASTROINTESTINAL: No abdominal or epigastric pain. No nausea, vomiting, or hematemesis; No diarrhea or constipation. No melena or hematochezia.  GENITOURINARY: No dysuria, frequency or hematuria.  NEUROLOGICAL: (+) back pain.   SKIN: No itching, rashes.

## 2023-10-23 NOTE — BRIEF OPERATIVE NOTE - NSICDXBRIEFPROCEDURE_GEN_ALL_CORE_FT
PROCEDURES:  Diagnostic ureteroscopy 23-Oct-2023 16:23:51  Stefan Black  Exchange, nephrostomy or pyelostomy tube 23-Oct-2023 16:24:15  Stefan Black

## 2023-10-23 NOTE — H&P ADULT - HISTORY OF PRESENT ILLNESS
62 y/o male s/p attempted unsuccessful L URS on 8/21. Distal-mid ureter noted to be entirely obstructed vs obliterated with inability to pass wire or contrast retrograde beyond area of obstruction. Now s/p IR L PCN 8/22. Presenting today for antegrade ureteroscopy with possible endopyelotomy.

## 2023-10-23 NOTE — ASU DISCHARGE PLAN (ADULT/PEDIATRIC) - ASU DC SPECIAL INSTRUCTIONSFT
URETEROSCOPY    GENERAL: It is common to have blood in your urine after your procedure. It may be pink or even red; and it is important to increase fluid intake to 2-3L of water per day to keep the urine as clear as possible. Please inform your doctor if you have a significant amount of clot in the urine or if you are unable to void at all. The urine may clear and then become bloody again especially as you are more physically active.    PERCUTANEOUS NEPHROSTOMY: Your PCN may drain bloody urine for several weeks after the procedure. If the PCN stops draining please contact Dr. Jiménez or go to the nearest ER for evaluation.    PAIN: You may take Tylenol (acetaminophen) 650-975mg and/or Motrin (ibuprofen) 400-600mg, both available over the counter, for pain every 6 hours as needed. Do not exceed 4000mg of Tylenol (acetaminophen) daily. You may alternate these medications such that you take one or the other every 3 hours for around the clock pain coverage.    STOOL SOFTENERS: Do not allow yourself to become constipated as straining may cause bleeding. Take stool softeners or a laxative (ex. Miralax, Colace, Senokot, ExLax, etc), available over the counter, if needed.    ANTICOAGULATION: If you are taking any blood thinning medications, please discuss with your urologist prior to restarting these medications unless otherwise specified.    BATHING: You may shower or bathe.    DIET: You may resume your regular diet and regular medication regimen.    ACTIVITY: No heavy lifting or strenuous exercise until you are evaluated at your post-operative appointment. Otherwise, you may return to your usual level of physical activity.    FOLLOW-UP: If you did not already schedule your post-operative appointment, please call your urologist to schedule and follow-up appointment.    CALL YOUR UROLOGIST IF: You have any bleeding that does not stop, inability to void >8 hours, fever over 100.4 F, chills, persistent nausea/vomiting, changes in your incision concerning for infection, or if your pain is not controlled on your discharge pain medications.

## 2023-10-23 NOTE — H&P ADULT - ASSESSMENT
64 y/o male s/p attempted unsuccessful L URS on 8/21. Distal-mid ureter noted to be entirely obstructed vs obliterated with inability to pass wire or contrast retrograde beyond area of obstruction. Now s/p IR L PCN 8/22. Presenting today for antegrade ureteroscopy with possible endopyelotomy.    OR today for above procedure

## 2023-10-23 NOTE — PRE-ANESTHESIA EVALUATION ADULT - NSANTHBPHIGHRD_ENT_A_CORE
No lesions,  no deformities, chest wall non-tender,  no masses present, breathing is unlabored without, normal breath sounds. 
No

## 2023-10-23 NOTE — H&P ADULT - NSHPPHYSICALEXAM_GEN_ALL_CORE
General: NAD, resting comfortably in bed  C/V: NSR  Pulm: Nonlabored breathing, no respiratory distress on room air  Abd: soft, ND/NT, no rebound tenderness, no guarding  : L PCN draining serosanguineous  Extrem: WWP, no edema, SCDs in place

## 2023-10-23 NOTE — ASU DISCHARGE PLAN (ADULT/PEDIATRIC) - NS MD DC FALL RISK RISK
For information on Fall & Injury Prevention, visit: [email or web address] OR  [email or web address] OR  https://www.cdc.gov/steadi/patient.html

## 2023-10-23 NOTE — ASU DISCHARGE PLAN (ADULT/PEDIATRIC) - CARE PROVIDER_API CALL
Jose Manuel Jiménez.  Urology  [address]  [place], [place] [id number]  Phone: [phone]  Fax: [phone]  Follow Up Time:

## 2023-10-26 ENCOUNTER — NON-APPOINTMENT (OUTPATIENT)
Age: 63
End: 2023-10-26

## 2023-10-26 PROBLEM — I82.409 ACUTE EMBOLISM AND THROMBOSIS OF UNSPECIFIED DEEP VEINS OF UNSPECIFIED LOWER EXTREMITY: Chronic | Status: ACTIVE | Noted: 2023-10-20

## 2023-10-26 PROBLEM — I10 ESSENTIAL (PRIMARY) HYPERTENSION: Chronic | Status: ACTIVE | Noted: 2023-10-20

## 2023-10-26 PROBLEM — J44.9 CHRONIC OBSTRUCTIVE PULMONARY DISEASE, UNSPECIFIED: Chronic | Status: ACTIVE | Noted: 2023-10-20

## 2023-10-26 PROBLEM — Z86.39 PERSONAL HISTORY OF OTHER ENDOCRINE, NUTRITIONAL AND METABOLIC DISEASE: Chronic | Status: ACTIVE | Noted: 2023-10-20

## 2023-10-31 ENCOUNTER — APPOINTMENT (OUTPATIENT)
Dept: UROLOGY | Facility: CLINIC | Age: 63
End: 2023-10-31
Payer: MEDICARE

## 2023-10-31 VITALS
SYSTOLIC BLOOD PRESSURE: 128 MMHG | HEIGHT: 71 IN | WEIGHT: 154 LBS | DIASTOLIC BLOOD PRESSURE: 87 MMHG | HEART RATE: 69 BPM | TEMPERATURE: 97.8 F | BODY MASS INDEX: 21.56 KG/M2 | OXYGEN SATURATION: 97 % | RESPIRATION RATE: 14 BRPM

## 2023-10-31 PROCEDURE — 99215 OFFICE O/P EST HI 40 MIN: CPT | Mod: 24

## 2023-10-31 PROCEDURE — G2212 PROLONG OUTPT/OFFICE VIS: CPT

## 2023-11-03 ENCOUNTER — APPOINTMENT (OUTPATIENT)
Dept: INTERNAL MEDICINE | Facility: CLINIC | Age: 63
End: 2023-11-03

## 2023-11-08 ENCOUNTER — OUTPATIENT (OUTPATIENT)
Dept: OUTPATIENT SERVICES | Facility: HOSPITAL | Age: 63
LOS: 1 days | End: 2023-11-08
Payer: MEDICARE

## 2023-11-08 ENCOUNTER — APPOINTMENT (OUTPATIENT)
Dept: NUCLEAR MEDICINE | Facility: HOSPITAL | Age: 63
End: 2023-11-08
Payer: MEDICARE

## 2023-11-08 DIAGNOSIS — Z98.890 OTHER SPECIFIED POSTPROCEDURAL STATES: Chronic | ICD-10-CM

## 2023-11-08 DIAGNOSIS — N13.5 CROSSING VESSEL AND STRICTURE OF URETER WITHOUT HYDRONEPHROSIS: ICD-10-CM

## 2023-11-08 PROCEDURE — A9562: CPT

## 2023-11-08 PROCEDURE — 51702 INSERT TEMP BLADDER CATH: CPT

## 2023-11-08 PROCEDURE — 78708 K FLOW/FUNCT IMAGE W/DRUG: CPT | Mod: 26,MH

## 2023-11-08 PROCEDURE — 78708 K FLOW/FUNCT IMAGE W/DRUG: CPT

## 2023-11-08 NOTE — ED ADULT NURSE NOTE - CAS ELECT INFOMATION PROVIDED
Continue with current treatment plan this is working very well for her and is well controlled and unlikely to be causing her sleeping difficulties at this time   DC instructions

## 2023-11-10 ENCOUNTER — NON-APPOINTMENT (OUTPATIENT)
Age: 63
End: 2023-11-10

## 2023-11-13 PROCEDURE — 84484 ASSAY OF TROPONIN QUANT: CPT

## 2023-11-13 PROCEDURE — 87077 CULTURE AEROBIC IDENTIFY: CPT

## 2023-11-13 PROCEDURE — 86140 C-REACTIVE PROTEIN: CPT

## 2023-11-13 PROCEDURE — 81001 URINALYSIS AUTO W/SCOPE: CPT

## 2023-11-13 PROCEDURE — 83605 ASSAY OF LACTIC ACID: CPT

## 2023-11-13 PROCEDURE — 97161 PT EVAL LOW COMPLEX 20 MIN: CPT

## 2023-11-13 PROCEDURE — 86850 RBC ANTIBODY SCREEN: CPT

## 2023-11-13 PROCEDURE — C1729: CPT

## 2023-11-13 PROCEDURE — 85025 COMPLETE CBC W/AUTO DIFF WBC: CPT

## 2023-11-13 PROCEDURE — 74018 RADEX ABDOMEN 1 VIEW: CPT

## 2023-11-13 PROCEDURE — 85730 THROMBOPLASTIN TIME PARTIAL: CPT

## 2023-11-13 PROCEDURE — 36415 COLL VENOUS BLD VENIPUNCTURE: CPT

## 2023-11-13 PROCEDURE — 86900 BLOOD TYPING SEROLOGIC ABO: CPT

## 2023-11-13 PROCEDURE — 82570 ASSAY OF URINE CREATININE: CPT

## 2023-11-13 PROCEDURE — 87086 URINE CULTURE/COLONY COUNT: CPT

## 2023-11-13 PROCEDURE — 97530 THERAPEUTIC ACTIVITIES: CPT

## 2023-11-13 PROCEDURE — 87150 DNA/RNA AMPLIFIED PROBE: CPT

## 2023-11-13 PROCEDURE — 87635 SARS-COV-2 COVID-19 AMP PRB: CPT

## 2023-11-13 PROCEDURE — 85610 PROTHROMBIN TIME: CPT

## 2023-11-13 PROCEDURE — 84300 ASSAY OF URINE SODIUM: CPT

## 2023-11-13 PROCEDURE — 80053 COMPREHEN METABOLIC PANEL: CPT

## 2023-11-13 PROCEDURE — 74176 CT ABD & PELVIS W/O CONTRAST: CPT

## 2023-11-13 PROCEDURE — 86901 BLOOD TYPING SEROLOGIC RH(D): CPT

## 2023-11-13 PROCEDURE — 80048 BASIC METABOLIC PNL TOTAL CA: CPT

## 2023-11-13 PROCEDURE — 93005 ELECTROCARDIOGRAM TRACING: CPT

## 2023-11-13 PROCEDURE — 99285 EMERGENCY DEPT VISIT HI MDM: CPT

## 2023-11-13 PROCEDURE — C1769: CPT

## 2023-11-13 PROCEDURE — 96372 THER/PROPH/DIAG INJ SC/IM: CPT

## 2023-11-13 PROCEDURE — 85652 RBC SED RATE AUTOMATED: CPT

## 2023-11-13 PROCEDURE — 74019 RADEX ABDOMEN 2 VIEWS: CPT

## 2023-11-13 PROCEDURE — 84443 ASSAY THYROID STIM HORMONE: CPT

## 2023-11-13 PROCEDURE — 82962 GLUCOSE BLOOD TEST: CPT

## 2023-11-13 PROCEDURE — 83735 ASSAY OF MAGNESIUM: CPT

## 2023-11-13 PROCEDURE — 84100 ASSAY OF PHOSPHORUS: CPT

## 2023-11-13 PROCEDURE — 87040 BLOOD CULTURE FOR BACTERIA: CPT

## 2023-11-13 PROCEDURE — 84439 ASSAY OF FREE THYROXINE: CPT

## 2023-11-13 PROCEDURE — 85027 COMPLETE CBC AUTOMATED: CPT

## 2023-11-13 PROCEDURE — 76000 FLUOROSCOPY <1 HR PHYS/QHP: CPT

## 2023-11-13 PROCEDURE — 50432 PLMT NEPHROSTOMY CATHETER: CPT

## 2023-11-13 PROCEDURE — 84436 ASSAY OF TOTAL THYROXINE: CPT

## 2023-11-13 PROCEDURE — 96374 THER/PROPH/DIAG INJ IV PUSH: CPT

## 2023-11-13 PROCEDURE — 80307 DRUG TEST PRSMV CHEM ANLYZR: CPT

## 2023-11-13 PROCEDURE — 96375 TX/PRO/DX INJ NEW DRUG ADDON: CPT

## 2023-11-13 PROCEDURE — 97116 GAIT TRAINING THERAPY: CPT

## 2023-11-27 ENCOUNTER — APPOINTMENT (OUTPATIENT)
Dept: UROLOGY | Facility: CLINIC | Age: 63
End: 2023-11-27

## 2023-11-27 DIAGNOSIS — N13.5 CROSSING VESSEL AND STRICTURE OF URETER W/OUT HYDRONEPHROSIS: ICD-10-CM

## 2023-11-29 ENCOUNTER — TRANSCRIPTION ENCOUNTER (OUTPATIENT)
Age: 63
End: 2023-11-29

## 2023-11-30 ENCOUNTER — RESULT REVIEW (OUTPATIENT)
Age: 63
End: 2023-11-30

## 2023-11-30 ENCOUNTER — INPATIENT (INPATIENT)
Facility: HOSPITAL | Age: 63
LOS: 0 days | Discharge: NURSING, REHAB OR LONG-TERM CARE | DRG: 659 | End: 2023-12-01
Attending: STUDENT IN AN ORGANIZED HEALTH CARE EDUCATION/TRAINING PROGRAM | Admitting: STUDENT IN AN ORGANIZED HEALTH CARE EDUCATION/TRAINING PROGRAM
Payer: MEDICARE

## 2023-11-30 VITALS
SYSTOLIC BLOOD PRESSURE: 112 MMHG | HEIGHT: 71 IN | RESPIRATION RATE: 16 BRPM | TEMPERATURE: 98 F | HEART RATE: 76 BPM | WEIGHT: 160.94 LBS | DIASTOLIC BLOOD PRESSURE: 78 MMHG | OXYGEN SATURATION: 99 %

## 2023-11-30 DIAGNOSIS — I12.9 HYPERTENSIVE CHRONIC KIDNEY DISEASE WITH STAGE 1 THROUGH STAGE 4 CHRONIC KIDNEY DISEASE, OR UNSPECIFIED CHRONIC KIDNEY DISEASE: ICD-10-CM

## 2023-11-30 DIAGNOSIS — G62.9 POLYNEUROPATHY, UNSPECIFIED: ICD-10-CM

## 2023-11-30 DIAGNOSIS — Z87.891 PERSONAL HISTORY OF NICOTINE DEPENDENCE: ICD-10-CM

## 2023-11-30 DIAGNOSIS — E03.9 HYPOTHYROIDISM, UNSPECIFIED: ICD-10-CM

## 2023-11-30 DIAGNOSIS — Z93.0 TRACHEOSTOMY STATUS: ICD-10-CM

## 2023-11-30 DIAGNOSIS — K68.2 RETROPERITONEAL FIBROSIS: ICD-10-CM

## 2023-11-30 DIAGNOSIS — N13.1 HYDRONEPHROSIS WITH URETERAL STRICTURE, NOT ELSEWHERE CLASSIFIED: ICD-10-CM

## 2023-11-30 DIAGNOSIS — K66.0 PERITONEAL ADHESIONS (POSTPROCEDURAL) (POSTINFECTION): ICD-10-CM

## 2023-11-30 DIAGNOSIS — Z79.890 HORMONE REPLACEMENT THERAPY: ICD-10-CM

## 2023-11-30 DIAGNOSIS — Z86.718 PERSONAL HISTORY OF OTHER VENOUS THROMBOSIS AND EMBOLISM: ICD-10-CM

## 2023-11-30 DIAGNOSIS — N28.89 OTHER SPECIFIED DISORDERS OF KIDNEY AND URETER: ICD-10-CM

## 2023-11-30 DIAGNOSIS — N18.9 CHRONIC KIDNEY DISEASE, UNSPECIFIED: ICD-10-CM

## 2023-11-30 DIAGNOSIS — Z93.6 OTHER ARTIFICIAL OPENINGS OF URINARY TRACT STATUS: ICD-10-CM

## 2023-11-30 DIAGNOSIS — N28.9 DISORDER OF KIDNEY AND URETER, UNSPECIFIED: ICD-10-CM

## 2023-11-30 DIAGNOSIS — J44.9 CHRONIC OBSTRUCTIVE PULMONARY DISEASE, UNSPECIFIED: ICD-10-CM

## 2023-11-30 DIAGNOSIS — G89.29 OTHER CHRONIC PAIN: ICD-10-CM

## 2023-11-30 DIAGNOSIS — Z93.1 GASTROSTOMY STATUS: ICD-10-CM

## 2023-11-30 DIAGNOSIS — M19.90 UNSPECIFIED OSTEOARTHRITIS, UNSPECIFIED SITE: ICD-10-CM

## 2023-11-30 DIAGNOSIS — Z86.16 PERSONAL HISTORY OF COVID-19: ICD-10-CM

## 2023-11-30 DIAGNOSIS — Z98.890 OTHER SPECIFIED POSTPROCEDURAL STATES: Chronic | ICD-10-CM

## 2023-11-30 LAB
BLD GP AB SCN SERPL QL: NEGATIVE — SIGNIFICANT CHANGE UP
BLD GP AB SCN SERPL QL: NEGATIVE — SIGNIFICANT CHANGE UP
RH IG SCN BLD-IMP: POSITIVE — SIGNIFICANT CHANGE UP
RH IG SCN BLD-IMP: POSITIVE — SIGNIFICANT CHANGE UP

## 2023-11-30 PROCEDURE — 88307 TISSUE EXAM BY PATHOLOGIST: CPT | Mod: 26

## 2023-11-30 PROCEDURE — 50225 REMOVAL KIDNEY OPEN COMPLEX: CPT | Mod: LT,22,79

## 2023-11-30 DEVICE — SURGICEL FIBRILLAR 4 X 4": Type: IMPLANTABLE DEVICE | Status: FUNCTIONAL

## 2023-11-30 DEVICE — LIGATING CLIPS WECK HEMOLOK POLYMER MEDIUM-LARGE (GREEN) 6: Type: IMPLANTABLE DEVICE | Status: FUNCTIONAL

## 2023-11-30 DEVICE — STAPLER COVIDIEN TRI-STAPLE 45MM TAN RELOAD: Type: IMPLANTABLE DEVICE | Status: FUNCTIONAL

## 2023-11-30 DEVICE — STAPLER COVIDIEN TRI-STAPLE 60MM TAN RELOAD: Type: IMPLANTABLE DEVICE | Status: FUNCTIONAL

## 2023-11-30 DEVICE — LIGATING CLIPS WECK HEMOLOK POLYMER LARGE (PURPLE) 6: Type: IMPLANTABLE DEVICE | Status: FUNCTIONAL

## 2023-11-30 RX ORDER — LANOLIN ALCOHOL/MO/W.PET/CERES
3 CREAM (GRAM) TOPICAL AT BEDTIME
Refills: 0 | Status: DISCONTINUED | OUTPATIENT
Start: 2023-11-30 | End: 2023-12-01

## 2023-11-30 RX ORDER — INFLUENZA VIRUS VACCINE 15; 15; 15; 15 UG/.5ML; UG/.5ML; UG/.5ML; UG/.5ML
0.5 SUSPENSION INTRAMUSCULAR ONCE
Refills: 0 | Status: DISCONTINUED | OUTPATIENT
Start: 2023-11-30 | End: 2023-12-01

## 2023-11-30 RX ORDER — OXYCODONE HYDROCHLORIDE 5 MG/1
5 TABLET ORAL EVERY 6 HOURS
Refills: 0 | Status: DISCONTINUED | OUTPATIENT
Start: 2023-11-30 | End: 2023-12-01

## 2023-11-30 RX ORDER — ONDANSETRON 8 MG/1
4 TABLET, FILM COATED ORAL EVERY 6 HOURS
Refills: 0 | Status: DISCONTINUED | OUTPATIENT
Start: 2023-11-30 | End: 2023-12-01

## 2023-11-30 RX ORDER — HEPARIN SODIUM 5000 [USP'U]/ML
5000 INJECTION INTRAVENOUS; SUBCUTANEOUS ONCE
Refills: 0 | Status: COMPLETED | OUTPATIENT
Start: 2023-11-30 | End: 2023-11-30

## 2023-11-30 RX ORDER — FOLIC ACID 0.8 MG
1 TABLET ORAL DAILY
Refills: 0 | Status: DISCONTINUED | OUTPATIENT
Start: 2023-11-30 | End: 2023-12-01

## 2023-11-30 RX ORDER — LEVOTHYROXINE SODIUM 125 MCG
1 TABLET ORAL
Refills: 0 | DISCHARGE

## 2023-11-30 RX ORDER — LEVOTHYROXINE SODIUM 125 MCG
150 TABLET ORAL DAILY
Refills: 0 | Status: DISCONTINUED | OUTPATIENT
Start: 2023-11-30 | End: 2023-12-01

## 2023-11-30 RX ORDER — HYDROMORPHONE HYDROCHLORIDE 2 MG/ML
0.5 INJECTION INTRAMUSCULAR; INTRAVENOUS; SUBCUTANEOUS EVERY 4 HOURS
Refills: 0 | Status: DISCONTINUED | OUTPATIENT
Start: 2023-11-30 | End: 2023-12-01

## 2023-11-30 RX ORDER — GABAPENTIN 400 MG/1
1 CAPSULE ORAL
Refills: 0 | DISCHARGE

## 2023-11-30 RX ORDER — HYDROMORPHONE HYDROCHLORIDE 2 MG/ML
0.2 INJECTION INTRAMUSCULAR; INTRAVENOUS; SUBCUTANEOUS EVERY 4 HOURS
Refills: 0 | Status: DISCONTINUED | OUTPATIENT
Start: 2023-11-30 | End: 2023-12-01

## 2023-11-30 RX ORDER — ACETAMINOPHEN 500 MG
1000 TABLET ORAL ONCE
Refills: 0 | Status: COMPLETED | OUTPATIENT
Start: 2023-11-30 | End: 2023-11-30

## 2023-11-30 RX ORDER — HEPARIN SODIUM 5000 [USP'U]/ML
5000 INJECTION INTRAVENOUS; SUBCUTANEOUS EVERY 8 HOURS
Refills: 0 | Status: DISCONTINUED | OUTPATIENT
Start: 2023-11-30 | End: 2023-12-01

## 2023-11-30 RX ORDER — THIAMINE MONONITRATE (VIT B1) 100 MG
100 TABLET ORAL DAILY
Refills: 0 | Status: DISCONTINUED | OUTPATIENT
Start: 2023-11-30 | End: 2023-12-01

## 2023-11-30 RX ORDER — ACETAMINOPHEN 500 MG
975 TABLET ORAL EVERY 6 HOURS
Refills: 0 | Status: DISCONTINUED | OUTPATIENT
Start: 2023-11-30 | End: 2023-12-01

## 2023-11-30 RX ORDER — SODIUM CHLORIDE 9 MG/ML
500 INJECTION INTRAMUSCULAR; INTRAVENOUS; SUBCUTANEOUS
Refills: 0 | Status: DISCONTINUED | OUTPATIENT
Start: 2023-11-30 | End: 2023-12-01

## 2023-11-30 RX ORDER — CEFTRIAXONE 500 MG/1
1000 INJECTION, POWDER, FOR SOLUTION INTRAMUSCULAR; INTRAVENOUS ONCE
Refills: 0 | Status: COMPLETED | OUTPATIENT
Start: 2023-11-30 | End: 2023-11-30

## 2023-11-30 RX ADMIN — HYDROMORPHONE HYDROCHLORIDE 0.2 MILLIGRAM(S): 2 INJECTION INTRAMUSCULAR; INTRAVENOUS; SUBCUTANEOUS at 22:11

## 2023-11-30 RX ADMIN — HYDROMORPHONE HYDROCHLORIDE 0.2 MILLIGRAM(S): 2 INJECTION INTRAMUSCULAR; INTRAVENOUS; SUBCUTANEOUS at 22:45

## 2023-11-30 RX ADMIN — HEPARIN SODIUM 5000 UNIT(S): 5000 INJECTION INTRAVENOUS; SUBCUTANEOUS at 11:50

## 2023-11-30 RX ADMIN — CEFTRIAXONE 100 MILLIGRAM(S): 500 INJECTION, POWDER, FOR SOLUTION INTRAMUSCULAR; INTRAVENOUS at 23:26

## 2023-11-30 RX ADMIN — Medication 975 MILLIGRAM(S): at 23:26

## 2023-11-30 RX ADMIN — SODIUM CHLORIDE 70 MILLILITER(S): 9 INJECTION INTRAMUSCULAR; INTRAVENOUS; SUBCUTANEOUS at 23:12

## 2023-11-30 RX ADMIN — HYDROMORPHONE HYDROCHLORIDE 0.5 MILLIGRAM(S): 2 INJECTION INTRAMUSCULAR; INTRAVENOUS; SUBCUTANEOUS at 23:11

## 2023-11-30 RX ADMIN — HYDROMORPHONE HYDROCHLORIDE 0.5 MILLIGRAM(S): 2 INJECTION INTRAMUSCULAR; INTRAVENOUS; SUBCUTANEOUS at 23:41

## 2023-11-30 RX ADMIN — Medication 1000 MILLIGRAM(S): at 11:49

## 2023-11-30 RX ADMIN — HEPARIN SODIUM 5000 UNIT(S): 5000 INJECTION INTRAVENOUS; SUBCUTANEOUS at 22:11

## 2023-11-30 NOTE — PRE-ANESTHESIA EVALUATION ADULT - NSANTHPMHFT_GEN_ALL_CORE
walks with walker/cane  denies sob, cp walks with walker/cane  denies sob,cp  hx of covid prolonged ICU stay/ trach and peg  poor Exercise tolerance due to severe degenerative arthritis

## 2023-11-30 NOTE — PRE-OP CHECKLIST - ALLERGY BAND ON
done Banner Transposition Flap Text: The defect edges were debeveled with a #15 scalpel blade.  Given the location of the defect and the proximity to free margins a Banner transposition flap was deemed most appropriate.  Using a sterile surgical marker, an appropriate flap drawn around the defect. The area thus outlined was incised deep to adipose tissue with a #15 scalpel blade.  The skin margins were undermined to an appropriate distance in all directions utilizing iris scissors.

## 2023-11-30 NOTE — PATIENT PROFILE ADULT - FALL HARM RISK - RISK INTERVENTIONS
Assistance OOB with selected safe patient handling equipment/Assistance with ambulation/Communicate Fall Risk and Risk Factors to all staff, patient, and family/Discuss with provider need for PT consult/Monitor gait and stability/Provide patient with walking aids - walker, cane, crutches/Reinforce activity limits and safety measures with patient and family/Sit up slowly, dangle for a short time, stand at bedside before walking/Use of alarms - bed, chair and/or voice tab/Visual Cue: Yellow wristband/Bed in lowest position, wheels locked, appropriate side rails in place/Call bell, personal items and telephone in reach/Instruct patient to call for assistance before getting out of bed or chair/Non-slip footwear when patient is out of bed/[place] to call system/Physically safe environment - no spills, clutter or unnecessary equipment/Purposeful Proactive Rounding/Room/bathroom lighting operational, light cord in reach

## 2023-11-30 NOTE — PROGRESS NOTE ADULT - SUBJECTIVE AND OBJECTIVE BOX
UROLOGY POST OP NOTE (PAGER # [phone])    PROCEDURE: robotic L simple nephrectomy converted to open    T(C): 36.1 (11-30-23 @ 21:18), Max: 36.6 (11-30-23 @ 11:34)  HR: 74 (11-30-23 @ 22:18) (72 - 86)  BP: 153/97 (11-30-23 @ 22:18) (112/78 - 160/83)  RR: 16 (11-30-23 @ 22:18) (16 - 18)  SpO2: 96% (11-30-23 @ 22:18) (94% - 99%)  Wt(kg): --  UO: adequate    SUBJECTIVE: c/o some incisional discomfort. No N/V. No CP/SOB.    ON PE: alert and awake    Abdomen: soft, incision site clean and dry    : Fc intact, urine clear                  UROLOGY POST OP NOTE (PAGER # [phone])    PROCEDURE: robotic L simple nephrectomy converted to open    T(C): 36.1 (11-30-23 @ 21:18), Max: 36.6 (11-30-23 @ 11:34)  HR: 74 (11-30-23 @ 22:18) (72 - 86)  BP: 153/97 (11-30-23 @ 22:18) (112/78 - 160/83)  RR: 16 (11-30-23 @ 22:18) (16 - 18)  SpO2: 96% (11-30-23 @ 22:18) (94% - 99%)  Wt(kg): --  UO: adequate    SUBJECTIVE: c/o some incisional discomfort. No N/V. No CP/SOB.    ON PE: alert and awake    Abdomen: soft, incision site clean and dry    : Fc intact, urine clear                  UROLOGY POST OP NOTE (PAGER # [phone])    PROCEDURE: robotic L simple nephrectomy converted to open    T(C): 36.1 (11-30-23 @ 21:18), Max: 36.6 (11-30-23 @ 11:34)  HR: 74 (11-30-23 @ 22:18) (72 - 86)  BP: 153/97 (11-30-23 @ 22:18) (112/78 - 160/83)  RR: 16 (11-30-23 @ 22:18) (16 - 18)  SpO2: 96% (11-30-23 @ 22:18) (94% - 99%)  Wt(kg): --  UO: adequate    SUBJECTIVE: c/o some incisional discomfort. No N/V. No CP/SOB.    ON PE: alert and awake    Abdomen: soft, incision site clean and dry    : Fc intact, urine clear

## 2023-11-30 NOTE — H&P ADULT - HISTORY OF PRESENT ILLNESS
63M here for discussion of hydronephrosis and ureteral stricture. He had severe covid and ferny, had a G tube placed and trach, and significant rehab. At some point in that course a left ureteral stent was placed. He believes this was at [facility] and transferred to [facility]. This was removed around early 2021 prior to seeing Dr. Jiménez. He is emptying the bag about once a day.    PMH: CKD, Osteomyelitis, COPD, hypothyroidism, polyneuropathy  Meds: levothyroxine, neurontin, tramadol  All: nka  FHx: No  malignancies  SocHx: former smoker since young man, etoh occasional, former drug use  ?  PSH: Trach, PEG, unknown if additional surgery while on ventilator for severe covid-19    Cr 1.40  Hgb 13.8 63M here for discussion of hydronephrosis and ureteral stricture. He had severe covid and ferny, had a G tube placed and trach, and significant rehab. At some point in that course a left ureteral stent was placed. He believes this was at [facility] and transferred to [facility]. This was removed around early 2021 prior to seeing Dr. Jiménez. He is emptying the bag about once a day.    PMH: CKD, Osteomyelitis, COPD, hypothyroidism, polyneuropathy  Meds: levothyroxine, neurontin, tramadol  All: nka  FHx: No  malignancies  SocHx: former smoker since young man, etoh occasional, former drug use  ?  PSH: Trach, PEG, unknown if additional surgery while on ventilator for severe covid-19    Cr 1.40  Hgb 13.8 63M here for discussion of hydronephrosis and ureteral stricture. He had severe covid and ferny, had a G tube placed and trach, and significant rehab. At some point in that course a left ureteral stent was placed. He believes this was at [facility] and transferred to [facility]. This was removed around early 2021 prior to seeing Dr. Jiménez. He is emptying the bag about once a day.    PMH: CKD, Osteomyelitis, COPD, hypothyroidism, polyneuropathy  Meds: levothyroxine, neurontin, tramadol  All: nka  FHx: No  malignancies  SocHx: former smoker since young man, etoh occasional, former drug use  ?  PSH: Trach, PEG, unknown if additional surgery while on ventilator for severe covid-19    Cr 1.40  Hgb 13.8 (0) awake

## 2023-11-30 NOTE — PROGRESS NOTE ADULT - ASSESSMENT
64 yo male s/p robotic L simple nephrectomy converted to open     62 yo male s/p robotic L simple nephrectomy converted to open

## 2023-12-01 ENCOUNTER — TRANSCRIPTION ENCOUNTER (OUTPATIENT)
Age: 63
End: 2023-12-01

## 2023-12-01 VITALS
DIASTOLIC BLOOD PRESSURE: 74 MMHG | RESPIRATION RATE: 18 BRPM | OXYGEN SATURATION: 98 % | HEART RATE: 80 BPM | TEMPERATURE: 99 F | SYSTOLIC BLOOD PRESSURE: 117 MMHG

## 2023-12-01 LAB
ANION GAP SERPL CALC-SCNC: 10 MMOL/L — SIGNIFICANT CHANGE UP (ref 5–17)
ANION GAP SERPL CALC-SCNC: 10 MMOL/L — SIGNIFICANT CHANGE UP (ref 5–17)
BASOPHILS # BLD AUTO: 0.01 K/UL — SIGNIFICANT CHANGE UP (ref 0–0.2)
BASOPHILS # BLD AUTO: 0.01 K/UL — SIGNIFICANT CHANGE UP (ref 0–0.2)
BASOPHILS NFR BLD AUTO: 0.1 % — SIGNIFICANT CHANGE UP (ref 0–2)
BASOPHILS NFR BLD AUTO: 0.1 % — SIGNIFICANT CHANGE UP (ref 0–2)
BUN SERPL-MCNC: 22 MG/DL — SIGNIFICANT CHANGE UP (ref 7–23)
BUN SERPL-MCNC: 22 MG/DL — SIGNIFICANT CHANGE UP (ref 7–23)
CALCIUM SERPL-MCNC: 8.6 MG/DL — SIGNIFICANT CHANGE UP (ref 8.4–10.5)
CALCIUM SERPL-MCNC: 8.6 MG/DL — SIGNIFICANT CHANGE UP (ref 8.4–10.5)
CHLORIDE SERPL-SCNC: 103 MMOL/L — SIGNIFICANT CHANGE UP (ref 96–108)
CHLORIDE SERPL-SCNC: 103 MMOL/L — SIGNIFICANT CHANGE UP (ref 96–108)
CO2 SERPL-SCNC: 26 MMOL/L — SIGNIFICANT CHANGE UP (ref 22–31)
CO2 SERPL-SCNC: 26 MMOL/L — SIGNIFICANT CHANGE UP (ref 22–31)
CREAT SERPL-MCNC: 1.41 MG/DL — HIGH (ref 0.5–1.3)
CREAT SERPL-MCNC: 1.41 MG/DL — HIGH (ref 0.5–1.3)
EGFR: 56 ML/MIN/1.73M2 — LOW
EGFR: 56 ML/MIN/1.73M2 — LOW
EOSINOPHIL # BLD AUTO: 0 K/UL — SIGNIFICANT CHANGE UP (ref 0–0.5)
EOSINOPHIL # BLD AUTO: 0 K/UL — SIGNIFICANT CHANGE UP (ref 0–0.5)
EOSINOPHIL NFR BLD AUTO: 0 % — SIGNIFICANT CHANGE UP (ref 0–6)
EOSINOPHIL NFR BLD AUTO: 0 % — SIGNIFICANT CHANGE UP (ref 0–6)
GLUCOSE SERPL-MCNC: 101 MG/DL — HIGH (ref 70–99)
GLUCOSE SERPL-MCNC: 101 MG/DL — HIGH (ref 70–99)
HCT VFR BLD CALC: 36.5 % — LOW (ref 39–50)
HCT VFR BLD CALC: 36.5 % — LOW (ref 39–50)
HGB BLD-MCNC: 11.5 G/DL — LOW (ref 13–17)
HGB BLD-MCNC: 11.5 G/DL — LOW (ref 13–17)
IMM GRANULOCYTES NFR BLD AUTO: 0.5 % — SIGNIFICANT CHANGE UP (ref 0–0.9)
IMM GRANULOCYTES NFR BLD AUTO: 0.5 % — SIGNIFICANT CHANGE UP (ref 0–0.9)
LYMPHOCYTES # BLD AUTO: 1.18 K/UL — SIGNIFICANT CHANGE UP (ref 1–3.3)
LYMPHOCYTES # BLD AUTO: 1.18 K/UL — SIGNIFICANT CHANGE UP (ref 1–3.3)
LYMPHOCYTES # BLD AUTO: 9.8 % — LOW (ref 13–44)
LYMPHOCYTES # BLD AUTO: 9.8 % — LOW (ref 13–44)
MCHC RBC-ENTMCNC: 26.7 PG — LOW (ref 27–34)
MCHC RBC-ENTMCNC: 26.7 PG — LOW (ref 27–34)
MCHC RBC-ENTMCNC: 31.5 GM/DL — LOW (ref 32–36)
MCHC RBC-ENTMCNC: 31.5 GM/DL — LOW (ref 32–36)
MCV RBC AUTO: 84.9 FL — SIGNIFICANT CHANGE UP (ref 80–100)
MCV RBC AUTO: 84.9 FL — SIGNIFICANT CHANGE UP (ref 80–100)
MONOCYTES # BLD AUTO: 0.93 K/UL — HIGH (ref 0–0.9)
MONOCYTES # BLD AUTO: 0.93 K/UL — HIGH (ref 0–0.9)
MONOCYTES NFR BLD AUTO: 7.8 % — SIGNIFICANT CHANGE UP (ref 2–14)
MONOCYTES NFR BLD AUTO: 7.8 % — SIGNIFICANT CHANGE UP (ref 2–14)
NEUTROPHILS # BLD AUTO: 9.8 K/UL — HIGH (ref 1.8–7.4)
NEUTROPHILS # BLD AUTO: 9.8 K/UL — HIGH (ref 1.8–7.4)
NEUTROPHILS NFR BLD AUTO: 81.8 % — HIGH (ref 43–77)
NEUTROPHILS NFR BLD AUTO: 81.8 % — HIGH (ref 43–77)
NRBC # BLD: 0 /100 WBCS — SIGNIFICANT CHANGE UP (ref 0–0)
NRBC # BLD: 0 /100 WBCS — SIGNIFICANT CHANGE UP (ref 0–0)
PLATELET # BLD AUTO: 239 K/UL — SIGNIFICANT CHANGE UP (ref 150–400)
PLATELET # BLD AUTO: 239 K/UL — SIGNIFICANT CHANGE UP (ref 150–400)
POTASSIUM SERPL-MCNC: 5.2 MMOL/L — SIGNIFICANT CHANGE UP (ref 3.5–5.3)
POTASSIUM SERPL-MCNC: 5.2 MMOL/L — SIGNIFICANT CHANGE UP (ref 3.5–5.3)
POTASSIUM SERPL-SCNC: 5.2 MMOL/L — SIGNIFICANT CHANGE UP (ref 3.5–5.3)
POTASSIUM SERPL-SCNC: 5.2 MMOL/L — SIGNIFICANT CHANGE UP (ref 3.5–5.3)
RBC # BLD: 4.3 M/UL — SIGNIFICANT CHANGE UP (ref 4.2–5.8)
RBC # BLD: 4.3 M/UL — SIGNIFICANT CHANGE UP (ref 4.2–5.8)
RBC # FLD: 14.9 % — HIGH (ref 10.3–14.5)
RBC # FLD: 14.9 % — HIGH (ref 10.3–14.5)
SODIUM SERPL-SCNC: 139 MMOL/L — SIGNIFICANT CHANGE UP (ref 135–145)
SODIUM SERPL-SCNC: 139 MMOL/L — SIGNIFICANT CHANGE UP (ref 135–145)
WBC # BLD: 11.98 K/UL — HIGH (ref 3.8–10.5)
WBC # BLD: 11.98 K/UL — HIGH (ref 3.8–10.5)
WBC # FLD AUTO: 11.98 K/UL — HIGH (ref 3.8–10.5)
WBC # FLD AUTO: 11.98 K/UL — HIGH (ref 3.8–10.5)

## 2023-12-01 PROCEDURE — 85025 COMPLETE CBC W/AUTO DIFF WBC: CPT

## 2023-12-01 PROCEDURE — 86900 BLOOD TYPING SEROLOGIC ABO: CPT

## 2023-12-01 PROCEDURE — 36415 COLL VENOUS BLD VENIPUNCTURE: CPT

## 2023-12-01 PROCEDURE — S2900: CPT

## 2023-12-01 PROCEDURE — 80048 BASIC METABOLIC PNL TOTAL CA: CPT

## 2023-12-01 PROCEDURE — 88307 TISSUE EXAM BY PATHOLOGIST: CPT

## 2023-12-01 PROCEDURE — 87086 URINE CULTURE/COLONY COUNT: CPT

## 2023-12-01 PROCEDURE — C1889: CPT

## 2023-12-01 PROCEDURE — 87186 SC STD MICRODIL/AGAR DIL: CPT

## 2023-12-01 PROCEDURE — 86901 BLOOD TYPING SEROLOGIC RH(D): CPT

## 2023-12-01 PROCEDURE — 86850 RBC ANTIBODY SCREEN: CPT

## 2023-12-01 RX ADMIN — Medication 100 MILLIGRAM(S): at 06:08

## 2023-12-01 RX ADMIN — Medication 975 MILLIGRAM(S): at 13:04

## 2023-12-01 RX ADMIN — HEPARIN SODIUM 5000 UNIT(S): 5000 INJECTION INTRAVENOUS; SUBCUTANEOUS at 05:57

## 2023-12-01 RX ADMIN — OXYCODONE HYDROCHLORIDE 5 MILLIGRAM(S): 5 TABLET ORAL at 04:35

## 2023-12-01 RX ADMIN — Medication 150 MICROGRAM(S): at 05:57

## 2023-12-01 RX ADMIN — OXYCODONE HYDROCHLORIDE 5 MILLIGRAM(S): 5 TABLET ORAL at 05:36

## 2023-12-01 RX ADMIN — Medication 1 MILLIGRAM(S): at 05:57

## 2023-12-01 RX ADMIN — Medication 975 MILLIGRAM(S): at 05:56

## 2023-12-01 RX ADMIN — SODIUM CHLORIDE 70 MILLILITER(S): 9 INJECTION INTRAMUSCULAR; INTRAVENOUS; SUBCUTANEOUS at 06:45

## 2023-12-01 RX ADMIN — Medication 975 MILLIGRAM(S): at 00:10

## 2023-12-01 RX ADMIN — Medication 975 MILLIGRAM(S): at 14:04

## 2023-12-01 RX ADMIN — Medication 975 MILLIGRAM(S): at 17:56

## 2023-12-01 RX ADMIN — HEPARIN SODIUM 5000 UNIT(S): 5000 INJECTION INTRAVENOUS; SUBCUTANEOUS at 13:05

## 2023-12-01 NOTE — DISCHARGE NOTE PROVIDER - CARE PROVIDER_API CALL
Saul Peña  Urology  [address], [address]  [place] [address]  Phone: [phone]  Fax: [phone]  Follow Up Time: 2 weeks   Saul Peña  Urology  [address], [address]  [place] [address]  Phone: [phone]  Fax: [phone]  Follow Up Time: 2 weeks   Saul Peña  Urology  [address], [address]  [place] [address]  Phone: [phone]  Fax: [phone]  Follow Up Time: 2 weeks

## 2023-12-01 NOTE — DISCHARGE NOTE PROVIDER - NSDCFUADDINST_GEN_ALL_CORE_FT
If sudden onset of fever >100.4F please call your Dr. or go to your closest ER.    Take your Augmentin until completed.    Please hydrate and ambulate as much as tolerated, you will need to walk around to help with your recovery.  Use Tylenol 650mg every 6-8hrs as needed for pain.

## 2023-12-01 NOTE — DISCHARGE NOTE NURSING/CASE MANAGEMENT/SOCIAL WORK - NSDCPEFALRISK_GEN_ALL_CORE
For information on Fall & Injury Prevention, visit: [email or web address] OR  [email or web address] OR  https://www.cdc.gov/steadi/patient.html

## 2023-12-01 NOTE — DISCHARGE NOTE PROVIDER - NSDCFUSCHEDAPPT_GEN_ALL_CORE_FT
Roberto Moseley  [facility] PreAdmits  Scheduled Appointment: 12/07/2023     Roberto Moseley  [facility] PreAdmits  Scheduled Appointment: 12/07/2023     Roberto Moseley  [facility] PreAdmits  Scheduled Appointment: 12/07/2023

## 2023-12-01 NOTE — PROGRESS NOTE ADULT - SUBJECTIVE AND OBJECTIVE BOX
INTERVAL HPI/OVERNIGHT EVENTS:  No acute events overnight.    VITALS:    T(F): 97.9 (12-01-23 @ 04:12), Max: 98.1 (12-01-23 @ 00:06)  HR: 62 (12-01-23 @ 04:12) (62 - 103)  BP: 118/77 (12-01-23 @ 04:12) (109/80 - 160/83)  RR: 18 (12-01-23 @ 04:12) (15 - 18)  SpO2: 98% (12-01-23 @ 04:12) (94% - 99%)  Wt(kg): --    I&O's Detail    30 Nov 2023 07:01  -  01 Dec 2023 05:15  --------------------------------------------------------  IN:    sodium chloride 0.9%: 420 mL  Total IN: 420 mL    OUT:    Indwelling Catheter - Urethral (mL): 500 mL  Total OUT: 500 mL    Total NET: -80 mL          MEDICATIONS:    ANTIBIOTICS:      PAIN CONTROL:  acetaminophen     Tablet .. 975 milliGRAM(s) Oral every 6 hours  HYDROmorphone  Injectable 0.2 milliGRAM(s) IV Push every 4 hours PRN  HYDROmorphone  Injectable 0.5 milliGRAM(s) IV Push every 4 hours PRN  melatonin 3 milliGRAM(s) Oral at bedtime PRN  ondansetron Injectable 4 milliGRAM(s) IV Push every 6 hours PRN  oxyCODONE    IR 5 milliGRAM(s) Oral every 6 hours PRN       MEDS:      HEME/ONC  heparin   Injectable 5000 Unit(s) SubCutaneous every 8 hours        PHYSICAL EXAM:  General: No acute distress.  Alert and Oriented  Abdominal Exam: soft, incision site clean and dry   Exam: FC intact, urine clear      LABS:                RADIOLOGY & ADDITIONAL TESTS:

## 2023-12-01 NOTE — DISCHARGE NOTE PROVIDER - PROVIDER TOKENS
PROVIDER:[TOKEN:[[id number]],FOLLOWUP:[2 weeks]] PROVIDER:[TOKEN:[[id number]],FOLLOWUP:[2 weeks]] PROVIDER:[TOKEN:[[id number]],FOLLOWUP:[2 weeks]]

## 2023-12-01 NOTE — DISCHARGE NOTE PROVIDER - NSDCMRMEDTOKEN_GEN_ALL_CORE_FT
acetaminophen 325 mg oral tablet: 3 tab(s) orally every 8 hours as needed for  mild pain  amoxicillin-clavulanate 500 mg-125 mg oral tablet: 500 milligram(s) orally 2 times a day MDD: 2  folic acid 1 mg oral tablet: 1 tab(s) orally once a day  gabapentin 300 mg oral capsule: 1 cap(s) orally once a day  levothyroxine 150 mcg (0.15 mg) oral capsule: 1 orally once a day  Lipotropic with Multivitamins oral capsule: 1 cap(s) orally once a day  melatonin 3 mg oral tablet: 1 tab(s) orally once a day (at bedtime)  Multiple Vitamins oral tablet: 1 tab(s) orally once a day  thiamine 100 mg oral tablet: 1 tab(s) orally once a day

## 2023-12-01 NOTE — PROGRESS NOTE ADULT - PROBLEM SELECTOR PLAN 1
-stable  -OOB  -IS, SCD's  -Diet: clears  -Antibx: ceftriaxone  -I's & O's  -pain control  -IVF's  -continue tate  -tele bed
-stable  -OOB  -SCD's, IS  -f/u labs  -d/c planning  -TOV this am  -continue present care

## 2023-12-01 NOTE — DISCHARGE NOTE NURSING/CASE MANAGEMENT/SOCIAL WORK - PATIENT PORTAL LINK FT
You can access the FollowMyHealth Patient Portal offered by [facility] by registering at the following website: [email or web address]. By joining [employer]’s FollowMyHealth portal, you will also be able to view your health information using other applications (apps) compatible with our system.

## 2023-12-01 NOTE — DISCHARGE NOTE PROVIDER - NSDCCPCAREPLAN_GEN_ALL_CORE_FT
PRINCIPAL DISCHARGE DIAGNOSIS  Diagnosis: NFK (non functioning kidney)  Assessment and Plan of Treatment:

## 2023-12-01 NOTE — DISCHARGE NOTE PROVIDER - HOSPITAL COURSE
Patient is a 63y old  Male who presents with a chief complaint of L simple nephrectomy (01 Dec 2023 05:15)      HPI:  63M here for discussion of hydronephrosis and ureteral stricture. He had severe covid and ferny, had a G tube placed and trach, and significant rehab. At some point in that course a left ureteral stent was placed. He believes this was at [facility] and transferred to [facility]. This was removed around early 2021 prior to seeing Dr. Jiménez. He is emptying the bag about once a day.    PMH: CKD, Osteomyelitis, COPD, hypothyroidism, polyneuropathy  Meds: levothyroxine, neurontin, tramadol  All: nka  FHx: No  malignancies  SocHx: former smoker since young man, etoh occasional, former drug use  ?  PSH: Trach, PEG, unknown if additional surgery while on ventilator for severe covid-19    12/1: Patient had no acute overnight events.  Patient able to tolerate PO and pain is controlled.  Patient cleared fo discharge to home.  Patient passed TOV voiding approx 200cc w/ PVR of 43cc. Patient is a 63y old  Male who presents with a chief complaint of L simple nephrectomy (01 Dec 2023 05:15)      HPI:  63M here for discussion of hydronephrosis and ureteral stricture. He had severe covid and ferny, had a G tube placed and trach, and significant rehab. At some point in that course a left ureteral stent was placed. He believes this was at [facility] and transferred to [facility]. This was removed around early 2021 prior to seeing Dr. Jiménez. He is emptying the bag about once a day.    PMH: CKD, Osteomyelitis, COPD, hypothyroidism, polyneuropathy  Meds: levothyroxine, neurontin, tramadol  All: nka  FHx: No  malignancies  SocHx: former smoker since young man, etoh occasional, former drug use  ?  PSH: Trach, PEG, unknown if additional surgery while on ventilator for severe covid-19    12/1: Patient had no acute overnight events.  Patient able to tolerate PO and pain is controlled.  Patient cleared fo discharge to home.  Patient passed TOV voiding approx 200cc w/ PVR of 43cc. Patient is a 63y old  Male who presents with a chief complaint of L simple nephrectomy (01 Dec 2023 05:15)      HPI:  63M here for discussion of hydronephrosis and ureteral stricture. He had severe covid and ferny, had a G tube placed and trach, and significant rehab. At some point in that course a left ureteral stent was placed. He believes this was at [facility] and transferred to [facility]. This was removed around early 2021 prior to seeing Dr. Jiménez. He is emptying the bag about once a day.    PMH: CKD, Osteomyelitis, COPD, hypothyroidism, polyneuropathy  Meds: levothyroxine, neurontin, tramadol  All: nka  FHx: No  malignancies  SocHx: former smoker since young man, etoh occasional, former drug use  ?  PSH: Trach, PEG, unknown if additional surgery while on ventilator for severe covid-19    12/1: Patient had no acute overnight events.  Patient able to tolerate PO and pain is controlled.  Patient cleared fo discharge to home.  Patient passed TOV voiding approx 200cc w/ PVR of 43cc.

## 2023-12-02 LAB
-  AMPICILLIN/SULBACTAM: SIGNIFICANT CHANGE UP
-  AMPICILLIN: SIGNIFICANT CHANGE UP
-  CEFAZOLIN: SIGNIFICANT CHANGE UP
-  CEFEPIME: SIGNIFICANT CHANGE UP
-  CEFTRIAXONE: SIGNIFICANT CHANGE UP
-  CIPROFLOXACIN: SIGNIFICANT CHANGE UP
-  ERTAPENEM: SIGNIFICANT CHANGE UP
-  GENTAMICIN: SIGNIFICANT CHANGE UP
-  PIPERACILLIN/TAZOBACTAM: SIGNIFICANT CHANGE UP
-  TOBRAMYCIN: SIGNIFICANT CHANGE UP
-  TRIMETHOPRIM/SULFAMETHOXAZOLE: SIGNIFICANT CHANGE UP
METHOD TYPE: SIGNIFICANT CHANGE UP

## 2023-12-03 LAB
-  AMIKACIN: SIGNIFICANT CHANGE UP
-  AMIKACIN: SIGNIFICANT CHANGE UP
-  AZTREONAM: SIGNIFICANT CHANGE UP
-  AZTREONAM: SIGNIFICANT CHANGE UP
-  CEFEPIME: SIGNIFICANT CHANGE UP
-  CEFEPIME: SIGNIFICANT CHANGE UP
-  CEFTRIAXONE: SIGNIFICANT CHANGE UP
-  CEFTRIAXONE: SIGNIFICANT CHANGE UP
-  CIPROFLOXACIN: SIGNIFICANT CHANGE UP
-  CIPROFLOXACIN: SIGNIFICANT CHANGE UP
-  GENTAMICIN: SIGNIFICANT CHANGE UP
-  GENTAMICIN: SIGNIFICANT CHANGE UP
-  LEVOFLOXACIN: SIGNIFICANT CHANGE UP
-  LEVOFLOXACIN: SIGNIFICANT CHANGE UP
-  MEROPENEM: SIGNIFICANT CHANGE UP
-  MEROPENEM: SIGNIFICANT CHANGE UP
-  PIPERACILLIN/TAZOBACTAM: SIGNIFICANT CHANGE UP
-  PIPERACILLIN/TAZOBACTAM: SIGNIFICANT CHANGE UP
-  TOBRAMYCIN: SIGNIFICANT CHANGE UP
-  TOBRAMYCIN: SIGNIFICANT CHANGE UP
-  TRIMETHOPRIM/SULFAMETHOXAZOLE: SIGNIFICANT CHANGE UP
-  TRIMETHOPRIM/SULFAMETHOXAZOLE: SIGNIFICANT CHANGE UP
CULTURE RESULTS: ABNORMAL
CULTURE RESULTS: ABNORMAL
METHOD TYPE: SIGNIFICANT CHANGE UP
METHOD TYPE: SIGNIFICANT CHANGE UP
ORGANISM # SPEC MICROSCOPIC CNT: ABNORMAL
ORGANISM # SPEC MICROSCOPIC CNT: SIGNIFICANT CHANGE UP
ORGANISM # SPEC MICROSCOPIC CNT: SIGNIFICANT CHANGE UP
SPECIMEN SOURCE: SIGNIFICANT CHANGE UP
SPECIMEN SOURCE: SIGNIFICANT CHANGE UP

## 2023-12-05 LAB
SURGICAL PATHOLOGY STUDY: SIGNIFICANT CHANGE UP
SURGICAL PATHOLOGY STUDY: SIGNIFICANT CHANGE UP

## 2023-12-07 ENCOUNTER — APPOINTMENT (OUTPATIENT)
Dept: UROLOGY | Facility: HOSPITAL | Age: 63
End: 2023-12-07

## 2024-01-05 ENCOUNTER — APPOINTMENT (OUTPATIENT)
Dept: UROLOGY | Facility: CLINIC | Age: 64
End: 2024-01-05

## 2024-01-05 NOTE — HISTORY OF PRESENT ILLNESS
[FreeTextEntry1] : REJI BLAKE Aug 13 1960  Language: English Date of First visit: 10/31/2023 Accompanied by: self Contact info: Referring Provider/PCP: Dr. Jiménez, PCP Dr. Diaz Fax: [phone]  CC/ Problem List: Left Nephrectomy (simple - nonfunctioning with complex proximal ureteral stricture)  =============================================================================== FIRST VISIT / Summary: Very pleasant 63 year old M here for discussion of hydronephrosis and ureteral stricture. He had severe covid and ferny, had a G tube placed and trach, and significant rehab. At some point in that course a left ureteral stent was placed. He believes this was at [facility] and transferred to [facility]. This was removed around early 2021 prior to seeing Dr. Jiménez. He is emptying the bag about once a day.   ------------------------------------------------------------------------------------------- INTERVAL VISITS: The patient's medications and allergies were reviewed and edited below. Dated 01/05/2024    ===============================================================================  PMH: CKD, Osteomyelitis, COPD, hypothyroidism, polyneuropathy Meds: levothyroxine, neurontin, tramadol All: nka FHx: No  malignancies SocHx: former smoker since young man, etoh occasional, former drug use  PSH: Trach, PEG, unknown if additional surgery while on ventilator for severe covid-19  ROS: Review of Systems is as per HPI unless otherwise denoted below  =============================================================================== DATA: LABS (SELECTED):---------------------------------------------------------------------------------------------------   RADS:------------------------------------------------------------------------------------------------------------------- 6/7/23: [facility] NM lasix scan with ?28% function of left kidney, but no drainage and photopenic collecting system / hydronephrosis. This scan done prior to PCN placement. ?splenic activity detected? The patient's imaging study prior to this visit was personally reviewed and the above is my independent interpretation for the  organ systems.  Fluoroscopy with Dr. Jiménez reviewed, appears approximately 3cm defect based on relative size of scope and gap.  PATHOLOGY/CYTOLOGY:-------------------------------------------------------------------------------------------   VOIDING STUDIES: ----------------------------------------------------------------------------------------------------   STONE STUDIES: (Analysis/LLSA)----------------------------------------------------------------------------------   PROCEDURES: ----------------------------------------------------------------------------------------------- 11/30/23: Left simple nephrectomy. Pathology benign  =============================================================================== PHYSICAL EXAM:  Abdomen: small g-tube scar, no additional incisions. No palpable coils in G tube tract  FOCUSED: ----------------------------------------------------------------------------------------------------------------   ======================================================================================= DISCUSSION:  ======================================================================================= ASSESSMENT and PLAN  1. Left ureteral stricture s/p Nephrectomy  - recovering as expected, RTC in 1 year renal US for solitary kidney  ======================================================================================= POA  Thank you for allowing me to assist in the care of your patient. Should you have any questions please do not hesitate to reach out to me.   Saul Peña MD       [facility] Physician [facility]  [place] Office: [address], [address] [place] [address] T: [phone] F: [phone]  [place] Office: [address], 1[address] [place] [address] T: [phone] F: [phone]

## 2024-01-08 NOTE — PHYSICAL THERAPY INITIAL EVALUATION ADULT - ASSISTIVE DEVICE FOR TRANSFER: SIT/STAND, REHAB EVAL
A wound care referral was sent for the patient. Writer called [facility] in [place] to confirm whether the patient is to be seen at [facility] in [place] or if the order was meant to be placed for home care. Writer is awaiting response from Dr. Silva' office. The patient will be following up with Dr. Silva on 1/31/24.   rolling walker/straight cane

## 2024-01-19 ENCOUNTER — APPOINTMENT (OUTPATIENT)
Dept: UROLOGY | Facility: CLINIC | Age: 64
End: 2024-01-19
Payer: MEDICARE

## 2024-01-19 VITALS
TEMPERATURE: 98.3 F | SYSTOLIC BLOOD PRESSURE: 121 MMHG | OXYGEN SATURATION: 97 % | HEART RATE: 75 BPM | DIASTOLIC BLOOD PRESSURE: 88 MMHG

## 2024-01-19 PROCEDURE — 99213 OFFICE O/P EST LOW 20 MIN: CPT | Mod: 24

## 2024-01-19 NOTE — HISTORY OF PRESENT ILLNESS
[FreeTextEntry1] : Name REJI BLAKE MRN [id number]  Aug 13 1960 ------------------------------------------------------------------------------------------------------------------------------------------- Date of First Visit: 23 Referring Provider/PCP: [facility] / ******** ------------------------------------------------------------------------------------------------------------------------------------------- CC: Hydronephrosis  History of Present Illness: REJI BLAKE is a 62 year male who presents for evaluation for left hydronephrosis. Was recently admitted to [facility] with head injury and lower back pain after sustaining a fall earlier from drinking too much alcohol. Underwent a CT lumbar/spine on 23, the results severe left-sided hydronephrosis with thinning of the renal parenchyma. Appearance suggests a chronic UPJ obstruction. Cr 1.92 upon discharge (1.7-2.1 during hospitalization). Unknown baseline renal function. While admitted to the hospital, was treated for chronic osteomyelitis while hospitalized s/p IR bone biopsy of L4-L5 OM/discitis 23.  Patient states he had a ureteral stent in  and it was removed after a few months, though he is unsure why he had the stent or where it was inserted.  He has no urinary symptoms at baseline. No urinary frequency, urgency, dysuria, sensation of incomplete emptying, nocturia, hematuria.  PVR 0 cc  RBUS 23: The prostate measures 3.3 x 3.0 x 4.0 cm, with volume of 21 mL. IMPRESSION: Severe left hydronephrosis. Left ureteral jet not definitively visualized on this examination. Increased echogenicity of the kidneys bilaterally, consistent with medical renal disease. CT LUMBAR SPINE from 2023. IMPRESSION: 1. No acute fracture. 2. Sclerotic endplate changes at L4-L5 with marked erosive changes. Could represent chronic sequelae from advanced degenerative disease or remote discitis, but can not exclude acute discitis osteomyelitis based on this imaging appearance alone. Correlation with prior imaging and medical history is recommended. Consider further evaluation with MRI lumbar spine with and without contrast, if clinically indicated. 3. Innumerable subtle lytic lesions seen throughout the bones. Could represent chronic changes from osteoporosis or multiple myeloma.4. Severe left-sided hydronephrosis with thinning of the renal parenchyma. Appearance suggests a chronic UPJ obstruction. Correlation with prior imaging is recommended. ------------------------------------------------------------------------------------------------------------------------------------------- Interval History (2023): Here for follow up for hydronephrosis and to discuss renal scan results which demonstrate obstruction of the left kidney at the level of the UPJ with 28% ipsilateral function. Has CKD with eGFR ~30 - upcoming consultation with Dr. Aldana of nephrology.  NUCLEAR MEDICINE RENAL SCAN WITH LASIX from 2023. IMPRESSION: 1. Minimally delayed arterial perfusion and functioning of the right kidney, without evidence of right-sided obstruction. 2. Minimal functioning of the obstructed left kidney with quantitative split function measuring 28% left/72% right. ------------------------------------------------------------------------------------------------------------------------------------------- Interval History (2023): We discussed findings of MAG3 and recent CT. Transition point is lower than UPJ, with unclear length of obstruction. Study also limited by lack of contrast (CKD). Patient recently saw Dr. Aldana for co-management of CKD. Cr 2.01/eGFR 37 ------------------------------------------------------------------------------------------------------------------------------------------- Interval History (2023): patient follows up for scheduling of surgery. He has a left nephrostomy tube in place since late August, following left URS and RPG on 23 which demonstrated a completely obliterated left ureter. ------------------------------------------------------------------------------------------------------------------------------------------- Interval History (2024): s/p open left nephrectomy with Dr. Peña 23. Has yet to have follow up with Dr. Aldana for CKD management.

## 2024-01-19 NOTE — ASSESSMENT
[FreeTextEntry1] : Assessment: REJI BLAKE is a 63-year-old M with CKD severe left hydronephrosis noted on recent CT scan with unknown baseline kidney function (1.7-2.1 during hospitalization). Renal scan demonstrated impaired functioning of the obstructed left kidney with quantitative split function measuring 28% left / 72% right. Left URS and RPG with attempted endopyelotomy on 9/21 was notable for complete ureteral obliteration necessitating left PCN insertion. After failed endopyelotomy he was offered laparoscopic pyeloplasty vs nephrectomy and opted for nephrectomy with understanding of risks and loss of nephrons.    Plan: -Scheduled appt wt Dr. Aldana for 2/15/24 -Patient expressed understanding of importance of regular nephrology follow-up to monitor CKD with solitary kidney

## 2024-02-08 NOTE — PROGRESS NOTE ADULT - PROBLEM SELECTOR PLAN 6
Follow up with PCP in 48-72 hours. Return to ED if any new or worsening of symptoms occur.    Patient with history of intermittent alcohol abuse, frequent falls while using alcohol. Currently p/w 6 alcoholic beverages today, Denies history of withdrawal, DTs, or seizure.   - Continue to monitor symptomatically

## 2024-02-15 ENCOUNTER — APPOINTMENT (OUTPATIENT)
Dept: NEPHROLOGY | Facility: CLINIC | Age: 64
End: 2024-02-15
Payer: MEDICAID

## 2024-02-15 VITALS — RESPIRATION RATE: 14 BRPM | HEART RATE: 80 BPM | DIASTOLIC BLOOD PRESSURE: 70 MMHG | SYSTOLIC BLOOD PRESSURE: 110 MMHG

## 2024-02-15 DIAGNOSIS — F17.200 NICOTINE DEPENDENCE, UNSPECIFIED, UNCOMPLICATED: ICD-10-CM

## 2024-02-15 DIAGNOSIS — N18.30 CHRONIC KIDNEY DISEASE, STAGE 3 UNSPECIFIED: ICD-10-CM

## 2024-02-15 DIAGNOSIS — N17.9 ACUTE KIDNEY FAILURE, UNSPECIFIED: ICD-10-CM

## 2024-02-15 DIAGNOSIS — Z90.5 ACQUIRED ABSENCE OF KIDNEY: ICD-10-CM

## 2024-02-15 DIAGNOSIS — N18.9 ACUTE KIDNEY FAILURE, UNSPECIFIED: ICD-10-CM

## 2024-02-15 PROCEDURE — 99215 OFFICE O/P EST HI 40 MIN: CPT

## 2024-02-15 PROCEDURE — G2211 COMPLEX E/M VISIT ADD ON: CPT

## 2024-02-16 NOTE — PHYSICAL EXAM
[General Appearance - Alert] : alert [General Appearance - In No Acute Distress] : in no acute distress [Sclera] : the sclera and conjunctiva were normal [PERRL With Normal Accommodation] : pupils were equal in size, round, and reactive to light [Extraocular Movements] : extraocular movements were intact [Outer Ear] : the ears and nose were normal in appearance [Oropharynx] : the oropharynx was normal [Neck Appearance] : the appearance of the neck was normal [Neck Cervical Mass (___cm)] : no neck mass was observed [Jugular Venous Distention Increased] : there was no jugular-venous distention [Respiration, Rhythm And Depth] : normal respiratory rhythm and effort [Exaggerated Use Of Accessory Muscles For Inspiration] : no accessory muscle use [Auscultation Breath Sounds / Voice Sounds] : lungs were clear to auscultation bilaterally [Heart Rate And Rhythm] : heart rate was normal and rhythm regular [Heart Sounds] : normal S1 and S2 [Heart Sounds Gallop] : no gallops [Murmurs] : no murmurs [Heart Sounds Pericardial Friction Rub] : no pericardial rub [Edema] : there was no peripheral edema [Veins - Varicosity Changes] : there were no varicosital changes [Bowel Sounds] : normal bowel sounds [Abdomen Soft] : soft [Abdomen Tenderness] : non-tender [Abdomen Mass (___ Cm)] : no abdominal mass palpated [No CVA Tenderness] : no ~M costovertebral angle tenderness [No Spinal Tenderness] : no spinal tenderness [Involuntary Movements] : no involuntary movements were seen [FreeTextEntry1] : Arthritic deformity bilateral hands [Skin Color & Pigmentation] : normal skin color and pigmentation [Skin Turgor] : normal skin turgor [] : no rash [Cranial Nerves] : cranial nerves 2-12 were intact [No Focal Deficits] : no focal deficits [Affect] : the affect was normal [Mood] : the mood was normal

## 2024-02-16 NOTE — HISTORY OF PRESENT ILLNESS
[FreeTextEntry1] : Patient is a 62 yo M with CKD, L sided hydronephrosis s/p nephrectomy, osteomyelitis, COPD current smoker, hypothyroidism, polyneuropathy, presenting to followup CKD  Still smoking, down from 2ppd to now 4 cigarettes per day, still a crutch from prior drug use Discussed kidney function and importance at length, regular blood work, life expectancy, dialysis.   Living at [facility].

## 2024-02-16 NOTE — ASSESSMENT
[FreeTextEntry1] : Patient is a 62 yo M with CKD, L sided hydronephrosis s/p nephrectomy, osteomyelitis, COPD current smoker, hypothyroidism, polyneuropathy, presenting to followup CKD  CHITO on CKD - CKD 2/2 obstruction, smoking, microvascular disease, now s/p nephrectomy. Will have labs done today to check   Anemia-CKD - labs today  MBD-CKD - labs today  Cigarette smoking - discussed at length, patient precontemplative  RTC in 3 months

## 2024-02-20 LAB
25(OH)D3 SERPL-MCNC: 39.3 NG/ML
ALBUMIN SERPL ELPH-MCNC: 4.1 G/DL
ALP BLD-CCNC: 96 U/L
ALT SERPL-CCNC: 9 U/L
ANION GAP SERPL CALC-SCNC: 11 MMOL/L
APPEARANCE: CLEAR
AST SERPL-CCNC: 11 U/L
BACTERIA: NEGATIVE /HPF
BASOPHILS # BLD AUTO: 0.04 K/UL
BASOPHILS NFR BLD AUTO: 1 %
BILIRUB SERPL-MCNC: 0.3 MG/DL
BILIRUBIN URINE: NEGATIVE
BLOOD URINE: NEGATIVE
BUN SERPL-MCNC: 17 MG/DL
CALCIUM SERPL-MCNC: 9.7 MG/DL
CALCIUM SERPL-MCNC: 9.7 MG/DL
CAST: 0 /LPF
CHLORIDE SERPL-SCNC: 103 MMOL/L
CO2 SERPL-SCNC: 26 MMOL/L
COLOR: YELLOW
CREAT SERPL-MCNC: 1.58 MG/DL
CREAT SPEC-SCNC: 92 MG/DL
CREAT SPEC-SCNC: 92 MG/DL
CREAT/PROT UR: 0.2 RATIO
CYSTATIN C SERPL-MCNC: 1.77 MG/L
EGFR: 49 ML/MIN/1.73M2
EOSINOPHIL # BLD AUTO: 0.2 K/UL
EOSINOPHIL NFR BLD AUTO: 4.9 %
EPITHELIAL CELLS: 0 /HPF
ESTIMATED AVERAGE GLUCOSE: 114 MG/DL
GFR/BSA.PRED SERPLBLD CYS-BASED-ARV: 36 ML/MIN/1.73M2
GLUCOSE QUALITATIVE U: NEGATIVE MG/DL
GLUCOSE SERPL-MCNC: 95 MG/DL
HBA1C MFR BLD HPLC: 5.6 %
HCT VFR BLD CALC: 43.6 %
HGB BLD-MCNC: 13.7 G/DL
IMM GRANULOCYTES NFR BLD AUTO: 0.2 %
KETONES URINE: NEGATIVE MG/DL
LEUKOCYTE ESTERASE URINE: NEGATIVE
LYMPHOCYTES # BLD AUTO: 1.61 K/UL
LYMPHOCYTES NFR BLD AUTO: 39.8 %
MAN DIFF?: NORMAL
MCHC RBC-ENTMCNC: 26.3 PG
MCHC RBC-ENTMCNC: 31.4 GM/DL
MCV RBC AUTO: 83.7 FL
MICROALBUMIN 24H UR DL<=1MG/L-MCNC: 3.5 MG/DL
MICROALBUMIN/CREAT 24H UR-RTO: 38 MG/G
MICROSCOPIC-UA: NORMAL
MONOCYTES # BLD AUTO: 0.48 K/UL
MONOCYTES NFR BLD AUTO: 11.9 %
NEUTROPHILS # BLD AUTO: 1.71 K/UL
NEUTROPHILS NFR BLD AUTO: 42.2 %
NITRITE URINE: NEGATIVE
PARATHYROID HORMONE INTACT: 36 PG/ML
PH URINE: 6
PHOSPHATE SERPL-MCNC: 3.4 MG/DL
PLATELET # BLD AUTO: 262 K/UL
POTASSIUM SERPL-SCNC: 5.5 MMOL/L
PROT SERPL-MCNC: 7.2 G/DL
PROT UR-MCNC: 15 MG/DL
PROTEIN URINE: NEGATIVE MG/DL
RBC # BLD: 5.21 M/UL
RBC # FLD: 17.2 %
RED BLOOD CELLS URINE: 0 /HPF
SODIUM ?TM SUB UR QN: 77 MMOL/L
SODIUM SERPL-SCNC: 140 MMOL/L
SPECIFIC GRAVITY URINE: 1.01
UROBILINOGEN URINE: 0.2 MG/DL
WBC # FLD AUTO: 4.05 K/UL
WHITE BLOOD CELLS URINE: 0 /HPF

## 2024-03-12 ENCOUNTER — APPOINTMENT (OUTPATIENT)
Dept: INFECTIOUS DISEASE | Facility: CLINIC | Age: 64
End: 2024-03-12
Payer: MEDICARE

## 2024-03-12 VITALS
WEIGHT: 148 LBS | HEIGHT: 71.5 IN | DIASTOLIC BLOOD PRESSURE: 69 MMHG | HEART RATE: 40 BPM | TEMPERATURE: 98.3 F | OXYGEN SATURATION: 98 % | SYSTOLIC BLOOD PRESSURE: 98 MMHG | BODY MASS INDEX: 20.27 KG/M2

## 2024-03-12 DIAGNOSIS — M46.26 OSTEOMYELITIS OF VERTEBRA, LUMBAR REGION: ICD-10-CM

## 2024-03-12 DIAGNOSIS — M54.9 DORSALGIA, UNSPECIFIED: ICD-10-CM

## 2024-03-12 PROCEDURE — 36415 COLL VENOUS BLD VENIPUNCTURE: CPT

## 2024-03-12 PROCEDURE — 99214 OFFICE O/P EST MOD 30 MIN: CPT | Mod: 25

## 2024-03-12 NOTE — ASSESSMENT
[FreeTextEntry1] : 63 year old M h/o COVID 2020 with prolonged illness, chronic LBP since ~2010, CKD (follows with Dr. Aldana) admitted 4/14/23 s/p fall with head injury and LBP, found to have OM/discitis at L4-L5. He had bone biopsy on 4/21/23. Cultures negative. He refused MOSHE so was discharged off antibiotics. Was then lost to follow up due to multiple admissions for kidney issues. Plan:  - Check ESR, CRP - MR lumbar spine w/wo IV contrast - depending upon result, will send dual isotope gallium/bone scan with SPECT - F/u with pain management Will contact patient with results or above and determine plan.

## 2024-03-12 NOTE — REVIEW OF SYSTEMS
[As Noted in HPI] : as noted in HPI [Fever] : no fever [Chills] : no chills [Eye Pain] : no eye pain [Nasal Discharge] : no nasal discharge [Sore Throat] : no sore throat [Chest Pain] : no chest pain [Shortness Of Breath] : no shortness of breath [Cough] : no cough [Abdominal Pain] : no abdominal pain [Diarrhea] : no diarrhea [Dysuria] : no dysuria [Skin Lesions] : no skin lesions

## 2024-03-12 NOTE — HISTORY OF PRESENT ILLNESS
[FreeTextEntry1] : 63 year old M h/o COVID 2020 with prolonged illness, chronic LBP since ~2010, CKD (follows with Dr. Aldana) admitted 4/14/23 s/p fall with head injury and LBP, found to have OM/discitis at L4-L5. He had bone biopsy on 4/21/23. Cultures negative. He refused MOSHE so was discharged off antibiotics.  Was readmitted 8/20/23 - 9/1/23 after being found on the ground 2/2 alcohol intoxication. Found to have hydronephrosis s/p left PCN 8/22/23. Repeat MRI done while inpatient showed no progression of disease. He had L nephrostomy tube placed in 08/2023. Now s/p L simple nephrectomy 11/30/23.   He has ongoing LBP that radiates to buttocks. Pain is not well controlled - has not seen pain management.

## 2024-03-12 NOTE — PHYSICAL EXAM
[General Appearance - Alert] : alert [General Appearance - In No Acute Distress] : in no acute distress [Sclera] : the sclera and conjunctiva were normal [Outer Ear] : the ears and nose were normal in appearance [Examination Of The Oral Cavity] : the lips and gums were normal [Oropharynx] : the oropharynx was normal with no thrush [Auscultation Breath Sounds / Voice Sounds] : lungs were clear to auscultation bilaterally [Heart Rate And Rhythm] : heart rate was normal and rhythm regular [Heart Sounds] : normal S1 and S2 [Murmurs] : no murmurs [Bowel Sounds] : normal bowel sounds [Edema] : there was no peripheral edema [Abdomen Soft] : soft [Abdomen Tenderness] : non-tender [Costovertebral Angle Tenderness] : no CVA tenderness [No Palpable Adenopathy] : no palpable adenopathy [Musculoskeletal - Swelling] : no joint swelling [] : no rash [Motor Exam] : the motor exam was normal [FreeTextEntry1] : Back:  Lower lumbar tenderness to palpation

## 2024-03-13 LAB
CRP SERPL-MCNC: 27 MG/L
ERYTHROCYTE [SEDIMENTATION RATE] IN BLOOD BY WESTERGREN METHOD: 71 MM/HR

## 2024-05-10 NOTE — ASU PATIENT PROFILE, ADULT - PROVIDER NOTIFICATION
Request for Controlled Substance      Date of Request: 5/10/2024  Medication: Lyrica   Last OV: 02/20/2024  Next OV: 06/24/2024  Last UDS: 02/20/2024  Last Narcotic Consent: 10/10/2023    Declines

## 2024-05-16 ENCOUNTER — APPOINTMENT (OUTPATIENT)
Dept: NEPHROLOGY | Facility: CLINIC | Age: 64
End: 2024-05-16

## 2024-06-18 NOTE — ED ADULT NURSE NOTE - RESPIRATORY ASSESSMENT
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 Today's visit is a co-visit with Dr. Mercado.     Reason for visit: kidney transplant follow up.    Medication Adherence/Access:   Patient takes medications directly from bottles. Dario does not want to use pillbox as this has not worked in the past for them.   Patient takes medications 2 time(s) per day (9AM/9PM-bicarb).   Reports getting all her doses in  Medication barriers: none.   The patient fills medications at [place]: NO, fills medications at [facility].    Kidney Transplant:  Patient is on the steroid avoidance protocol. Dario received induction therapy with thymoglobulin total cumulative dose of 6 mg/kg (Last infusion 23). Her post transplant course was complicated by UTI, positive culture for E. Coli and bacteroides fragilis (treatment now completed). She was also treated prolong for positive culture for actinomyces with 6 months post-transplant of amoxicillin 875 mg twice daily, now completed.     Current immunosuppressants  Envarsus (tacrolimus XR) 8 mg daily (goal level 6-8)  Azathioprine 100 mg daily  (~2 mg/kg)     Pt reports No current side effects. 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 No current diarrhea reported    Last tacrolimus level: 10.1 on 24     Estimated Creatinine Clearance: 71.3 mL/min (A) (based on SCr of 1 mg/dL (H)).  CMV prophylaxis: Completed- x 6 months post transplant  Donor (+), Recipient (+)  PCP prophylaxis:Bactrim 80 mg daily- will continue past 1 year post transplant for UTI prevention   Antifungal Prophylaxis: Completed  Thrombosis  "prophylaxis: Completed x 3 months post transplant- no side effects reported  PPI use: completed  Current supplements for electrolyte replacement: sodium bicarbonate 1950 mg twice daily, last CO2  21 on 6/17/21  Tx Coordinator: Tio Todd MD: Jess  Recent Infections:  none reported  Recent Hospitalizations: none in past 30 days  Immunizations(defer until 6 months post transplant): annual flu shot 10/24/23, Pneumovax 23:  6/16/15; Prevnar 13: 2/27/15, DTap/TDaP:  2/27/15    Hypertension:   Amlodipine 10 mg daily  Patient reports the following medication side effects:none.  Patient does not self-monitor blood pressure.    BP Readings from Last 3 Encounters:   06/18/24 105/69   05/14/24 102/69   04/16/24 102/64     Supplements:  Cholecalciferol 100 mcg daily -increased dose from 50 mcg to 100 mcg in march 2024    No current issues reported.     BP Readings from Last 1 Encounters:   06/18/24 105/69     Pulse Readings from Last 1 Encounters:   06/18/24 77     Wt Readings from Last 1 Encounters:   06/18/24 111 lb 12.4 oz (50.7 kg) (18%, Z= -0.92)*     * Growth percentiles are based on CDC (Girls, 2-20 Years) data.     Ht Readings from Last 1 Encounters:   06/18/24 4' 10.58\" (1.488 m) (1%, Z= -2.23)*     * Growth percentiles are based on CDC (Girls, 2-20 Years) data.     Estimated body mass index is 22.9 kg/m  as calculated from the following:    Height as of an earlier encounter on 6/18/24: 4' 10.58\" (1.488 m).    Weight as of an earlier encounter on 6/18/24: 111 lb 12.4 oz (50.7 kg).    Temp Readings from Last 1 Encounters:   11/09/23 99.2  F (37.3  C) (Oral)         Assessment/Plan:    Decrease tacro (Envarsus) to 6 mg daily   Due for follow up vitamin D level     Follow-up:  3-6 months with transplant office visit     I spent 30 minutes with this patient today. All changes were made via verbal approval with Dr. Mercado.     A summary of these recommendations was declined by the patient.    Lisa Thompson, " PharmD, BCPS  Pediatric Medication Therapy Management Pharmacist-Solid Organ Transplant     Medication Therapy Recommendations  Kidney replaced by transplant    Current Medication: tacrolimus (ENVARSUS XR) 1 MG 24 hr tablet   Rationale: Dose too high - Dosage too high - Safety   Recommendation: Decrease Dose - Envarsus XR 1 MG Tb24 - decrease to 6 mg daily   Status: Accepted per Provider         Takes dietary supplements    Current Medication: vitamin D3 (CHOLECALCIFEROL) 50 mcg (2000 units) tablet   Rationale: Medication requires monitoring - Needs additional monitoring   Recommendation: Order Lab - cholecalciferol 50 MCG (2000 UT) tablet - vitamin d follow up   Status: Accepted per Provider                - - -

## 2024-07-03 NOTE — ED ADULT NURSE NOTE - GASTROINTESTINAL ASSESSMENT
Medication requesting: Lyrica 150 mg    Reason why patient is requesting refill:    No follow up appointment made- PSRs please call patient to schedule follow up visit with anyone on Dr. Eller team    Is refill request a controlled substance medication:  Yes. The patient has had telephone or emergency/urgent care pain encounters since the patient's last visit on 23. PDMP Reviewed: Appropriate *copy & paste PDMP back to last office visit if on controlled substance*  Pregabalin  150MG / Capsule  Rx# [id number] Other Qty: 28  Days: 28  Refills: 2 Prescribed: 2024  Dispensed: 2024  Sold: 2024 BASILIO GARCIA 'NATHANAEL [facility] [id number]  [address] [facility] [address] ARINA CAPELLAN  : 1979 [address] [facility] [address]  Pay Type: Medicare     Next Office Visit Date: Pain pump refill on 8/15/24 with Kylah MURPHY    Last Office Visit Plan of Care: 23    ASSESSMENT AND PLAN:  Arina Capellan is a 44 year old female with history of chronic low back pain that radiates to her bilateral buttocks L>R. She historically had a left SI joint fusion which initially provided relief. However, the efficacy waned and she had failed to response to conservative measures leading to intrathecal pain pump placement. Initially, she reported ~50% improvement in her pain though since her revision in 2022 she reports minimal (25%) relief with in her pain. At this juncture, she is unsure whether her chronic pain is improved with her intrathecal pain pump despite numerous adjustments and medication rotations.      Her primary concern today is axial low back pain particularly worse on the left hand side. She feels that this pain has been exacerbated with increased activity as she has been caring for her sick dog (lifting >35 lbs regularly). She continues to display evidence of mechanical low back pain on examination as well as left sided myofascial pain. We discussed targeted left sided trigger point  injections for the muscular component of her pain. Additionally, We discussed options surrounding the management of her chronic low back pain with her intrathecal pain pump. At this time she would like to try one further dose increase to see if efficacy improves, however, should she continue to fail to appreciate benefit I would recommend weaning her dose for consideration for explantation. Pending her response we discussed possible treatment options for her mechanical low back pain including medial branch SPR versus Reactiv8. She is agreeable to our plan as below and denies further questions or concerns at this time.      All pertinent diagnostic imaging was reviewed and discussed with the patient during this visit.    Based on today's evaluation, I informed patient that the likely generators of her pain are sacroiliac joint dysfunction, mechanical low back pain, lumbar spondylosis, myofascial pain.  We discussed the risks and benefits of medication management and interventional treatment.  Our multimodal treatment options are outlined below.     DIAGNOSIS:   1. Sacroiliac joint dysfunction    2. Mechanical low back pain    3. Lumbar spondylosis    4. Myofascial pain    5. Presence of intrathecal pump             PLAN:  Medications:   Refill and continueContinue Lyrica 150 mg daily #30 R2  Taking Ativan 0.5 mg daily   Taking Celexa 30 mg daily   Taking Emgality 120 mg/mL q monthly   Taking Imitrex 25 mg PRN migraine  Refill and Continue Tizanidine 2-4 mg q 8 hrs PRN #90 R1  Diagnostics:   None indicated at this time.  Interventions:   None indicated at this time.   In the future consider lumbar medial branch sprint versus reactiv8  Advanced/Devices:   Intrathecal Pump:  Company (size): SYLLETA 20 ml  Drug Concentration: Fentanyl 2,000 mcg/mL + bupivacaine 20 mg/mL (concentration increased from fentanyl 1,000 mcg/mL + bupivacaine 10.0 mg/mL)  Infusion: Simple Continuous + PTM   Daily Dose: Fentanyl 500.1 mcg/day +  bupivacaine 5.001 mg/day (increased from  Fentanyl 451.2 mcg/day + bupivacaine 4.512 mg/day)  PTM:  Fentanyl 40 mcg/2 min/2 hour lockout/5 per day   Bridge bolus: 17 hours and 14 mins  Refill date: 9/21/23  Consults: None indicated at this time.  Physical Therapy: Recommend ongoing activity as tolerated.      - - -

## 2024-10-07 ENCOUNTER — EMERGENCY (EMERGENCY)
Facility: HOSPITAL | Age: 64
LOS: 1 days | Discharge: HOME | End: 2024-10-07
Attending: EMERGENCY MEDICINE | Admitting: EMERGENCY MEDICINE
Payer: MEDICARE

## 2024-10-07 VITALS
HEART RATE: 71 BPM | RESPIRATION RATE: 16 BRPM | TEMPERATURE: 98 F | OXYGEN SATURATION: 97 % | SYSTOLIC BLOOD PRESSURE: 121 MMHG | DIASTOLIC BLOOD PRESSURE: 75 MMHG

## 2024-10-07 VITALS
TEMPERATURE: 98 F | OXYGEN SATURATION: 100 % | DIASTOLIC BLOOD PRESSURE: 80 MMHG | RESPIRATION RATE: 18 BRPM | HEART RATE: 75 BPM | WEIGHT: 169.98 LBS | SYSTOLIC BLOOD PRESSURE: 117 MMHG

## 2024-10-07 DIAGNOSIS — Z98.890 OTHER SPECIFIED POSTPROCEDURAL STATES: Chronic | ICD-10-CM

## 2024-10-07 PROCEDURE — 99284 EMERGENCY DEPT VISIT MOD MDM: CPT

## 2024-10-07 RX ORDER — TRAMADOL HYDROCHLORIDE 50 MG/1
50 TABLET, COATED ORAL ONCE
Refills: 0 | Status: DISCONTINUED | OUTPATIENT
Start: 2024-10-07 | End: 2024-10-07

## 2024-10-07 RX ADMIN — Medication 600 MILLIGRAM(S): at 20:44

## 2024-10-07 RX ADMIN — TRAMADOL HYDROCHLORIDE 50 MILLIGRAM(S): 50 TABLET, COATED ORAL at 20:45

## 2024-10-07 NOTE — ED ADULT NURSE NOTE - OBJECTIVE STATEMENT
pt c/o  back pain and admits to smoking marijuana. was given 15mg Toradol IM by [facility]. a+ox4, resp even and unlabored.

## 2024-10-07 NOTE — ED ADULT TRIAGE NOTE - ESI TRIAGE ACUITY LEVEL, MLM
Covid/flu swab negative.  Left message on number provided at visit.  Number verified on answering machine.     2

## 2024-10-07 NOTE — ED ADULT NURSE REASSESSMENT NOTE - NS ED NURSE REASSESS COMMENT FT1
Report received from Jesus ALVAREZ for continuity of care  Pt in bed, alert to verbal pain  Pt reports back pain  Reports he hasn't taken his medication in a while, cannot remember what meds  Environment safe for pt  Plan of care ongoing

## 2024-10-07 NOTE — ED PROVIDER NOTE - NSFOLLOWUPINSTRUCTIONS_ED_ALL_ED_FT
Altered Mental Status    WHAT YOU NEED TO KNOW:    What is altered mental status? Altered mental status (AMS) is a disruption in how your brain works that causes a change in behavior. This change can happen suddenly or over days. AMS ranges from slight confusion to total disorientation and increased sleepiness to coma.    What causes AMS? AMS can be caused by physical, psychological, and environmental factors. In older adults, AMS may be caused by a combination of these factors. The following are some of the most common causes of AMS:    Diseases or conditions in the body that can affect your brain and nervous system:  Hypoxia (low oxygen levels)    Low or high blood sugar levels, or diabetic ketoacidosis    Heart attack    Dehydration, low or high blood sodium levels    Thyroid or adrenal gland disease    Urinary tract infection or renal failure    Diseases or conditions within your skull:  Seizures    Head traumas, concussions    Brain tumors or strokes    Brain disease, such as encephalopathy    High altitude cerebral edema (brain tissue swelling at high altitude)    Other factors:  Burns    Hypothermia (low body temperature), hyperthermia (high body temperature), or heat stroke    Toxic plants, such as mushrooms    Carbon monoxide    Drug or alcohol withdrawal    Mental health conditions  What factors increase the risk for AMS?    Older adults may have AMS after changes in their medicines, heart attacks, or hip fractures. Infections, such as urinary tract infections or pneumonia, can also increase the risk for AMS.    A medical history of high blood pressure, heart problems, diabetes, or psychiatric illness increases the risk for AMS.  What are the signs and symptoms of AMS? You, or those close to you, will notice changes in how you think and in your awareness, movements, and routines. The following are some of the more common signs:    Lack of concentration or forgetfulness    Slow responses    Hallucinations and changes in sleep patterns    Decreased or increased movement    Agitation or rambling speech    Cannot or will not follow reasonable requests    Cannot be aroused from sleep  How is AMS diagnosed? Your healthcare provider will do a physical exam and ask you and your family about your usual mental status. The provider will want know if the changes happened suddenly or over time. Tell the provider about recent events, such as falls or other traumas or illnesses. Witnesses may be asked about your behavior. Your provider will ask about the medicines you take and any problems with substance misuse. You may also need the following tests to learn the cause of your AMS:    A neurological exam tells healthcare providers if you are having problems with your brain or nerves. During the exam, your reflexes will be tested.    Vital signs give healthcare providers information about your current health. Healthcare providers will check your blood pressure, heart rate, breathing rate, and temperature. They will also ask about your pain. They will measure the amount of oxygen in your blood with a device called a pulse oximeter.    Blood tests are done to check the function of your liver, kidneys, and lungs. They will also show if you have an infection or other toxins in your body. Your blood glucose level will also be checked.    A chest x-ray is a picture of your lungs and heart. Healthcare providers use the x-ray to look for signs of infection, such as pneumonia, that could be causing your AMS.    A CT scan is also called a CAT scan. An x-ray machine uses a computer to take pictures of your head. The pictures may show a tumor, swelling, or bleeding on the brain.    A lumbar puncture is also called a spinal tap. During a lumbar puncture, you will need to lie still. Healthcare providers may give you medicine to numb a small area of your back. A needle will be put in and fluid will be removed from around your spinal cord. The fluid will be sent to a lab for tests. The tests check for infection, bleeding around your brain and spinal cord, or other problems.  How is AMS treated? Treatment will depend on the cause of your AMS. Treatments with oxygen and medicines may be needed to improve your symptoms. You may be placed in the hospital if your symptoms are severe.    When should I or someone close to me contact my healthcare provider?    You have sudden changes in behavior.    You are more sleepy or confused than usual.    You have questions or concerns about your condition or care.  When should I or someone close to me seek immediate care?    Your speech is slurred or you are rambling.    You have a seizure.    You are not able to move any part of your body freely.    You cannot be woken up.

## 2024-10-07 NOTE — ED PROVIDER NOTE - OBJECTIVE STATEMENT
65 y/o M BIBEMS for public intoxication, admits to "smoking weed." Denies co-ingestions. Per EMS, pt was c/o back pain and was given 15mg toradol IM. In the ED, denies pain or trauma, asking to be left alone to sleep. Minimal participation in history taking, mostly mumbling with a sheet held tightly over his head.

## 2024-10-07 NOTE — ED PROVIDER NOTE - CLINICAL SUMMARY MEDICAL DECISION MAKING FREE TEXT BOX
Clinically sober at time of discharge. + chronic atraumatic back pain for which he takes tramadol BID, will give home dose. d/c home to assisted living facility.

## 2024-10-07 NOTE — ED PROVIDER NOTE - PATIENT PORTAL LINK FT
You can access the FollowMyHealth Patient Portal offered by [facility] by registering at the following website: [email or web address]. By joining [employer]’s FollowMyHealth portal, you will also be able to view your health information using other applications (apps) compatible with our system.

## 2024-10-07 NOTE — ED ADULT NURSE NOTE - NSFALLUNIVINTERV_ED_ALL_ED
Bed/Stretcher in lowest position, wheels locked, appropriate side rails in place/Call bell, personal items and telephone in reach/Instruct patient to call for assistance before getting out of bed/chair/stretcher/Non-slip footwear applied when patient is off stretcher/[place] to call system/Physically safe environment - no spills, clutter or unnecessary equipment/Purposeful proactive rounding/Room/bathroom lighting operational, light cord in reach
Name band;

## 2024-10-11 DIAGNOSIS — R41.82 ALTERED MENTAL STATUS, UNSPECIFIED: ICD-10-CM

## 2024-10-11 DIAGNOSIS — G89.29 OTHER CHRONIC PAIN: ICD-10-CM

## 2024-10-11 DIAGNOSIS — F12.920 CANNABIS USE, UNSPECIFIED WITH INTOXICATION, UNCOMPLICATED: ICD-10-CM

## 2024-11-22 ENCOUNTER — EMERGENCY (EMERGENCY)
Facility: HOSPITAL | Age: 64
LOS: 1 days | Discharge: HOME | End: 2024-11-22
Admitting: EMERGENCY MEDICINE
Payer: MEDICARE

## 2024-11-22 VITALS
TEMPERATURE: 98 F | DIASTOLIC BLOOD PRESSURE: 65 MMHG | OXYGEN SATURATION: 98 % | SYSTOLIC BLOOD PRESSURE: 101 MMHG | HEART RATE: 75 BPM | RESPIRATION RATE: 16 BRPM

## 2024-11-22 VITALS
SYSTOLIC BLOOD PRESSURE: 108 MMHG | OXYGEN SATURATION: 94 % | DIASTOLIC BLOOD PRESSURE: 70 MMHG | HEART RATE: 78 BPM | RESPIRATION RATE: 15 BRPM | TEMPERATURE: 98 F

## 2024-11-22 DIAGNOSIS — Z98.890 OTHER SPECIFIED POSTPROCEDURAL STATES: Chronic | ICD-10-CM

## 2024-11-22 LAB
ALBUMIN SERPL ELPH-MCNC: 2.6 G/DL — LOW (ref 3.4–5)
ALP SERPL-CCNC: 67 U/L — SIGNIFICANT CHANGE UP (ref 40–120)
ALT FLD-CCNC: 22 U/L — SIGNIFICANT CHANGE UP (ref 12–42)
ANION GAP SERPL CALC-SCNC: 5 MMOL/L — LOW (ref 9–16)
AST SERPL-CCNC: 30 U/L — SIGNIFICANT CHANGE UP (ref 15–37)
BASOPHILS # BLD AUTO: 0.03 K/UL — SIGNIFICANT CHANGE UP (ref 0–0.2)
BASOPHILS NFR BLD AUTO: 0.6 % — SIGNIFICANT CHANGE UP (ref 0–2)
BILIRUB SERPL-MCNC: 0.2 MG/DL — SIGNIFICANT CHANGE UP (ref 0.2–1.2)
BUN SERPL-MCNC: 31 MG/DL — HIGH (ref 7–23)
CALCIUM SERPL-MCNC: 8.1 MG/DL — LOW (ref 8.5–10.5)
CHLORIDE SERPL-SCNC: 108 MMOL/L — SIGNIFICANT CHANGE UP (ref 96–108)
CO2 SERPL-SCNC: 27 MMOL/L — SIGNIFICANT CHANGE UP (ref 22–31)
CREAT SERPL-MCNC: 1.75 MG/DL — HIGH (ref 0.5–1.3)
CRP SERPL-MCNC: 49.5 MG/L — HIGH (ref 0.5–3)
EGFR: 43 ML/MIN/1.73M2 — LOW
EOSINOPHIL # BLD AUTO: 0.18 K/UL — SIGNIFICANT CHANGE UP (ref 0–0.5)
EOSINOPHIL NFR BLD AUTO: 3.8 % — SIGNIFICANT CHANGE UP (ref 0–6)
GLUCOSE SERPL-MCNC: 98 MG/DL — SIGNIFICANT CHANGE UP (ref 70–99)
HCT VFR BLD CALC: 34 % — LOW (ref 39–50)
HGB BLD-MCNC: 11.1 G/DL — LOW (ref 13–17)
IMM GRANULOCYTES NFR BLD AUTO: 0.2 % — SIGNIFICANT CHANGE UP (ref 0–0.9)
LYMPHOCYTES # BLD AUTO: 1.2 K/UL — SIGNIFICANT CHANGE UP (ref 1–3.3)
LYMPHOCYTES # BLD AUTO: 25.5 % — SIGNIFICANT CHANGE UP (ref 13–44)
MCHC RBC-ENTMCNC: 27.4 PG — SIGNIFICANT CHANGE UP (ref 27–34)
MCHC RBC-ENTMCNC: 32.6 G/DL — SIGNIFICANT CHANGE UP (ref 32–36)
MCV RBC AUTO: 84 FL — SIGNIFICANT CHANGE UP (ref 80–100)
MONOCYTES # BLD AUTO: 0.38 K/UL — SIGNIFICANT CHANGE UP (ref 0–0.9)
MONOCYTES NFR BLD AUTO: 8.1 % — SIGNIFICANT CHANGE UP (ref 2–14)
NEUTROPHILS # BLD AUTO: 2.9 K/UL — SIGNIFICANT CHANGE UP (ref 1.8–7.4)
NEUTROPHILS NFR BLD AUTO: 61.8 % — SIGNIFICANT CHANGE UP (ref 43–77)
NRBC # BLD: 0 /100 WBCS — SIGNIFICANT CHANGE UP (ref 0–0)
PLATELET # BLD AUTO: 277 K/UL — SIGNIFICANT CHANGE UP (ref 150–400)
POTASSIUM SERPL-MCNC: 4.3 MMOL/L — SIGNIFICANT CHANGE UP (ref 3.5–5.3)
POTASSIUM SERPL-SCNC: 4.3 MMOL/L — SIGNIFICANT CHANGE UP (ref 3.5–5.3)
PROT SERPL-MCNC: 6.8 G/DL — SIGNIFICANT CHANGE UP (ref 6.4–8.2)
RBC # BLD: 4.05 M/UL — LOW (ref 4.2–5.8)
RBC # FLD: 17.2 % — HIGH (ref 10.3–14.5)
SODIUM SERPL-SCNC: 140 MMOL/L — SIGNIFICANT CHANGE UP (ref 132–145)
WBC # BLD: 4.7 K/UL — SIGNIFICANT CHANGE UP (ref 3.8–10.5)
WBC # FLD AUTO: 4.7 K/UL — SIGNIFICANT CHANGE UP (ref 3.8–10.5)

## 2024-11-22 PROCEDURE — 99285 EMERGENCY DEPT VISIT HI MDM: CPT

## 2024-11-22 PROCEDURE — 72220 X-RAY EXAM SACRUM TAILBONE: CPT | Mod: 26

## 2024-11-22 PROCEDURE — 72131 CT LUMBAR SPINE W/O DYE: CPT | Mod: 26,MC

## 2024-11-22 RX ORDER — LIDOCAINE HYDROCHLORIDE 40 MG/ML
1 SOLUTION TOPICAL ONCE
Refills: 0 | Status: COMPLETED | OUTPATIENT
Start: 2024-11-22 | End: 2024-11-22

## 2024-11-22 RX ORDER — ACETAMINOPHEN 500 MG
975 TABLET ORAL ONCE
Refills: 0 | Status: COMPLETED | OUTPATIENT
Start: 2024-11-22 | End: 2024-11-22

## 2024-11-22 RX ADMIN — Medication 975 MILLIGRAM(S): at 16:53

## 2024-11-22 RX ADMIN — Medication 975 MILLIGRAM(S): at 02:39

## 2024-11-22 RX ADMIN — LIDOCAINE HYDROCHLORIDE 1 PATCH: 40 SOLUTION TOPICAL at 02:40

## 2024-11-22 NOTE — ED ADULT NURSE REASSESSMENT NOTE - NS ED NURSE REASSESS COMMENT FT1
Pt sleeping on stretcher, respirations even and unlabored on room air. No distress noted. Pt pending labs. No needs noted at this time.

## 2024-11-22 NOTE — ED ADULT NURSE NOTE - NSICDXPASTMEDICALHX_GEN_ALL_CORE_FT
PAST MEDICAL HISTORY:  2019 novel coronavirus disease (COVID-19) w/intubation    Acute embolism and thrombosis of deep vein of lower extremity     Arthritis     Chronic back pain     Chronic obstructive pulmonary disease (COPD)     DJD (degenerative joint disease)     H/O: hypothyroidism     HTN (hypertension)     Thyroid disease

## 2024-11-22 NOTE — ED PROVIDER NOTE - NSFOLLOWUPINSTRUCTIONS_ED_ALL_ED_FT
Back Pain    Back pain is very common in adults. The cause of back pain is rarely dangerous and the pain often gets better over time. The cause of your back pain may not be known and may include strain of muscles or ligaments, degeneration of the spinal disks, or arthritis. Occasionally the pain may radiate down your leg(s). Over-the-counter medicines to reduce pain and inflammation are often the most helpful. Stretching and remaining active frequently helps the healing process.     SEEK IMMEDIATE MEDICAL CARE IF YOU HAVE ANY OF THE FOLLOWING SYMPTOMS: bowel or bladder control problems, unusual weakness or numbness in your arms or legs, nausea or vomiting, abdominal pain, fever, dizziness/lightheadedness.     ===      Fall Prevention for Older Adults    AMBULATORY CARE:    Fall prevention includes ways to make your home and other areas safer. It also includes ways you can move more carefully to prevent a fall. As you age, your muscles weaken and your risk for falls increases. Your risk also increases if you take medicines that make you sleepy or dizzy. You may also be at risk if you have vision or joint problems, have low blood pressure, or are not active.    Arrange to have someone call your local emergency number (911 in the ) if:    You have fallen and are found unconscious.    You have fallen and cannot move part of your body.  Call your doctor if:    You have fallen and have pain or a headache.    You have questions or concerns about your condition or care.  Fall prevention tips:    Stay active. Exercise can help strengthen your muscles and improve your balance. Your healthcare provider may recommend water aerobics, walking, or Ben [place]. Your provider may also recommend physical therapy to improve your coordination. Never start an exercise program without asking your provider first.  Water Aerobics for Seniors  Ben Chi for Seniors      Wear shoes that fit well and have soles that . Wear shoes both inside and outside. Use slippers with good . Avoid shoes with high heels.    Use assistive devices as directed. Your provider may suggest that you use a cane or walker to help you keep your balance. You may need to have grab bars put in your bathroom near the toilet or in the shower.    Stand or sit up slowly. This may help you keep your balance and prevent falls.    Manage your medical conditions. Keep all appointments with your healthcare providers. Visit your eye doctor as directed.  Home safety tips:  Fall Prevention for Seniors    Add items to prevent falls in the bathroom. Put nonslip strips on your bath or shower floor to prevent you from slipping. Use a bath mat if you do not have carpet in the bathroom. This will prevent you from falling when you step out of the bath or shower. Use a shower seat so you do not need to stand while you shower. Sit on the toilet or a chair in your bathroom to dry yourself and put on clothing. This will prevent you from losing your balance from drying or dressing yourself while you are standing.    Keep paths clear. Remove books, shoes, and other objects from walkways and stairs. Place cords for telephones and lamps out of the way so that you do not need to walk over them. Tape them down if you cannot move them. Remove small rugs. If you cannot remove a rug, secure it with double-sided tape. This will prevent you from tripping.    Install bright lights in your home. Use night lights to help light paths to the bathroom or kitchen. Always turn on the light before you start walking.    Keep items you use often on shelves within reach. Do not use a step stool to help you reach an item.    Paint or place reflective tape on the edges of your stairs. This will help you see the stairs better.  Plan ahead in case you do fall: Talk with family members, friends, and neighbors to create a fall plan. Someone will need to call for emergency help if you are injured or found unconscious. If possible, keep a mobile phone with you at all times, or wear an emergency alert device. You can contact emergency services by pressing a button on the device. Ask your healthcare provider for more information.    Follow up with your doctor as directed: Write down your questions so you remember to ask them during your visits.

## 2024-11-22 NOTE — ED PROVIDER NOTE - PATIENT PORTAL LINK FT
You can access the FollowMyHealth Patient Portal offered by [facility] by registering at the following website: [email or web address]. By joining [employer]’s FollowMyHealth portal, you will also be able to view your health information using other applications (apps) compatible with our system.

## 2024-11-22 NOTE — ED ADULT TRIAGE NOTE - CHIEF COMPLAINT QUOTE
BIBA from [facility] with complaints of chronic back pain, normally ambulatory with his walker which he arrived with. Arrives aox4. Endorses he fell this morning and hurt his knees. Pt not allowing for vs assessment or assessment of his knees at triage, will reattempt.

## 2024-11-22 NOTE — ED PROVIDER NOTE - OBJECTIVE STATEMENT
64-year-old male, undomiciled, presents to the ED complaining of low back pain after falling when he tripped on a crack in the sidewalk.  Patient reports falling backwards landing on his buttocks.  He endorses pain in his sacral/coccyx area.  He has been able to ambulate since the event but states it has not been like his normal.  Denies any lower extremity weakness, paresthesias, head trauma, chest pain or shortness of breath.

## 2024-11-22 NOTE — ED PROVIDER NOTE - PROGRESS NOTE DETAILS
X-ray unclear if there is an underlying fracture so CT obtained.  As per radiology read, possibility of discitis versus osteomyelitis.  Labs reviewed and patient is not found to have an elevated white blood cell count; CRP levels found to be 49.5.  Case discussed with Dr. Leiva from spine who after reviewing results has low suspicion for discitis or osteomyelitis.  Recommending outpatient MRI for patient.  Results discussed with patient and patient given a copy of the CT.  He is instructed to follow-up with Dr. Leiva to receive an outpatient MRI.  Return precautions discussed and patient stable as he leaves.

## 2024-11-22 NOTE — ED PROVIDER NOTE - CLINICAL SUMMARY MEDICAL DECISION MAKING FREE TEXT BOX
64-year-old male, undomiciled, presents to the ED complaining of low back pain after falling when he tripped on a crack in the sidewalk.  On exam, patient is tender to palpation along the coccyx with some mild bilateral paraspinal tenderness as well in that region.  No evidence of lower extremity weakness and patient does not describe cauda equina symptoms.  Will plan to obtain an x-ray of the area to rule out any acute fracture and give p.o. Tylenol for pain relief.  Patient also states he is hungry, will give him a sandwich while he is here.  Plan to reassess and dispo pending medical workup.

## 2024-11-22 NOTE — ED BEHAVIORAL HEALTH NOTE - BEHAVIORAL HEALTH NOTE
Patient is from [facility], [address] [place] [id number], tel: [phone]. KAILEY informed Virgie from admissions that transportation was arranged for patient to return. SW will continue to remain available.

## 2024-11-22 NOTE — ED ADULT NURSE NOTE - CHIEF COMPLAINT QUOTE
BIBA from [facility] with complaints of chronic back pain, normally ambulatory with his walker which he arrived with. Arrives aox4. Endorses he fell this morning and hurt his knees. Pt not allowing for vs assessment or assessment of his knees at triage, will reattempt.

## 2024-11-22 NOTE — ED PROVIDER NOTE - PHYSICAL EXAMINATION
VITAL SIGNS: I have reviewed nursing notes and confirm.  CONSTITUTIONAL: Well-developed; well-nourished; in no acute distress.  SKIN: No acute rash.  HEENT: Normocephalic; atraumatic.  CARD: No extremity cyanosis. RRR, S1S2.  RESP: Speaks in full, clear sentences. CTA andree. No adventitious BS.  EXT: Moves all extremities normally.  SPINE: +ttp over the coccyx and mild andree paraspinal region in that area.  NEURO: Alert, oriented. Grossly unremarkable. No focal deficits. Fluent speech.   PSYCH: Cooperative, appropriate. Mood and affect wnl.

## 2024-11-22 NOTE — ED ADULT NURSE REASSESSMENT NOTE - NS ED NURSE REASSESS COMMENT FT1
Received sign out from outgoing RN. Pt resting on stretcher, respirations even and unlabored on room air. No distress noted. Pt endorses lower back pain. Pending imaging/medications.

## 2024-11-25 DIAGNOSIS — M54.50 LOW BACK PAIN, UNSPECIFIED: ICD-10-CM

## 2024-11-25 DIAGNOSIS — Z59.00 HOMELESSNESS UNSPECIFIED: ICD-10-CM
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
## 2024-11-25 SDOH — ECONOMIC STABILITY - HOUSING INSECURITY: HOMELESSNESS UNSPECIFIED: Z59.00

## 2024-12-05 NOTE — ED PROVIDER NOTE - IV ALTEPLASE EXCL ABS HIDDEN
Post-Op Assessment Note    CV Status:  Stable  Pain Score: 0    Pain management: adequate       Mental Status:  Alert and awake   Hydration Status:  Euvolemic and stable   PONV Controlled:  None   Airway Patency:  Patent     Post Op Vitals Reviewed: Yes    No anethesia notable event occurred.    Staff: CRNA       Last Filed PACU Vitals:  Vitals Value Taken Time   Temp     Pulse 67 12/05/24 0748   /56 12/05/24 0748   Resp 12 12/05/24 0748   SpO2 99 % 12/05/24 0748       Modified Colleen:  Activity: 2 (12/5/2024  6:48 AM)  Respiration: 2 (12/5/2024  6:48 AM)  Circulation: 2 (12/5/2024  6:48 AM)  Consciousness: 2 (12/5/2024  6:48 AM)  Oxygen Saturation: 2 (12/5/2024  6:48 AM)  Modified Colleen Score: 10 (12/5/2024  6:48 AM)         show

## 2025-07-14 NOTE — ED PROVIDER NOTE - PROGRESS NOTE DETAILS
[Hepatitis A] : Hepatitis A Sign out from Dr. Galvan: Pt w etoh intox and chronic low back pain, refused to accept transport back to his facility when transport arrived for him.  Now pending reassessment after analgesia.    On chart review, MRI 07/27/23 shows L4/5 osteomyelitis.  Note from ID Dr. Jiménez on 8/2/23: no significant progression from MRI in 04/2023; plan for dual isotope bone/gallium scan with SPECT CT to try to differentiate between osteo and degenerative disease, noted to have discussed w pt.  Pt does not recall this and is not able to say whether or not he had other imaging done elsewhere (no further imaging in chart), received abx, or was admitted to hospital.  Concern for osteo as etiology of back pain.  Spoke with pt's after hours advocate Bekah who is unaware of any diagnosis of osteomyelitis, does not have full medical history.  Will check labs including ESR/ CRP, start vanc/ zosyn empirically, continue to treat pain.  Anticipate admission.    Bekah Waterman RN pt advocate [phone] (only works after hours)  Ashleigh Padron [phone]     -Don KRUEGER CRP wnl.  ESR pending.  Will add on CT a/p as pt had abn CT a/p last month which showed L hydroureter and L hydronephrosis, was supposed to have ureteroscopy but did not show up for procedure.    Plan to admit to medicine at  for further workup for possible osteo.  -Don KRUEGER

## (undated) DEVICE — SP SHEATH

## (undated) DEVICE — SP NEEDLE DRIVER 6MM

## (undated) DEVICE — GOWN XL

## (undated) DEVICE — TUBING RANGER FLUID IRRIGATION SET DISP

## (undated) DEVICE — SYR ASEPTO

## (undated) DEVICE — DRAINAGE BAG URINARY 2L

## (undated) DEVICE — SP KIT LAP ENTRYGUIDE STND L100X25MM

## (undated) DEVICE — SUT CLIP LAPRA-TY ABSORBABLE SIZE 0.118 TO 0.12" VIOLET

## (undated) DEVICE — SP DRAPE ARM

## (undated) DEVICE — SYR CATH TIP 2 OZ

## (undated) DEVICE — DRAIN RESERVOIR FOR JACKSON PRATT 100CC CARDINAL

## (undated) DEVICE — SUT MONOCRYL 4-0 27" PS-2 UNDYED

## (undated) DEVICE — FOLEY HOLDER STATLOCK 2 WAY ADULT

## (undated) DEVICE — Device

## (undated) DEVICE — TIP METZENBAUM SCISSOR MONOPOLAR ENDOCUT (ORANGE)

## (undated) DEVICE — LIGASURE IMPACT

## (undated) DEVICE — DRSG DERMABOND 0.7ML

## (undated) DEVICE — SP MARYLAND BIPOLAR FORCEP 6MM

## (undated) DEVICE — INSUFFLATION NDL COVIDIEN SURGINEEDLE VERESS 120MM

## (undated) DEVICE — STAPLER COVIDIEN ENDO GIA STANDARD HANDLE

## (undated) DEVICE — PACK GENERAL LAPAROSCOPY

## (undated) DEVICE — DRAPE C ARM 41X74"

## (undated) DEVICE — GOWN ROYAL SILK XL

## (undated) DEVICE — SP BIPOLAR FORCEP FENSTRATED 6MM

## (undated) DEVICE — SUT VICRYL 0 54" TIES

## (undated) DEVICE — VENODYNE/SCD SLEEVE CALF MEDIUM

## (undated) DEVICE — TUBING AIRSEAL TRI-LUMEN FILTERED

## (undated) DEVICE — TROCAR SURGIQUEST AIRSEAL 12MMX100MM

## (undated) DEVICE — SP CLIP APPLIER MEDIUM-LARGE 6MM

## (undated) DEVICE — SP MONOPOLAR SCISSOR CURVED 6MM

## (undated) DEVICE — FOLEY CATH 2-WAY 20FR 5CC UNCOATED SILICONE

## (undated) DEVICE — SUT VLOC 180 3-0 6" V-20 GREEN

## (undated) DEVICE — TUBING ROSI REMOTE VTI

## (undated) DEVICE — DRAPE INSTRUMENT POUCH 10" X 18"

## (undated) DEVICE — TROCAR GELPOINT MINI ADVANCED

## (undated) DEVICE — SUT VICRYL 3-0 27" SH

## (undated) DEVICE — SUT VICRYL 2-0 27" SH

## (undated) DEVICE — SP COVER CAMERA SHEATH

## (undated) DEVICE — PACK CYSTO

## (undated) DEVICE — SUT VLOC 180 2-0 9" GS-22 GREEN

## (undated) DEVICE — GLV 7.5 PROTEXIS (WHITE)

## (undated) DEVICE — DRAIN JACKSON PRATT 10MM FLAT 3/4 NO TROCAR

## (undated) DEVICE — SUT VICRYL 0 27" UR-6

## (undated) DEVICE — ENDOCATCH 10MM SPECIMEN POUCH

## (undated) DEVICE — TROCAR COVIDIEN VERSAPORT BLADELESS OPTICAL 5MM STANDARD

## (undated) DEVICE — TIP SCISSOR MCS TIP 10/PK

## (undated) DEVICE — FOLEY CATH 2-WAY 18FR 5CC LATEX HYDROGEL